# Patient Record
Sex: MALE | Race: WHITE | NOT HISPANIC OR LATINO | Employment: OTHER | URBAN - METROPOLITAN AREA
[De-identification: names, ages, dates, MRNs, and addresses within clinical notes are randomized per-mention and may not be internally consistent; named-entity substitution may affect disease eponyms.]

---

## 2017-08-23 ENCOUNTER — HOSPITAL ENCOUNTER (OUTPATIENT)
Dept: RADIOLOGY | Facility: HOSPITAL | Age: 32
Discharge: HOME | End: 2017-08-23
Attending: FAMILY MEDICINE
Payer: MEDICARE

## 2017-08-23 DIAGNOSIS — M25.562 LEFT KNEE PAIN, UNSPECIFIED CHRONICITY: ICD-10-CM

## 2017-08-23 DIAGNOSIS — R93.6 ABNORMAL X-RAY OF KNEE: ICD-10-CM

## 2017-08-23 PROCEDURE — 73700 CT LOWER EXTREMITY W/O DYE: CPT | Mod: TC,LT

## 2017-08-23 PROCEDURE — 73700 CT LOWER EXTREMITY W/O DYE: CPT | Mod: 26,LT,, | Performed by: RADIOLOGY

## 2017-11-06 ENCOUNTER — HOSPITAL ENCOUNTER (EMERGENCY)
Facility: HOSPITAL | Age: 32
Discharge: HOME | End: 2017-11-06
Attending: EMERGENCY MEDICINE
Payer: MEDICARE

## 2017-11-06 VITALS
DIASTOLIC BLOOD PRESSURE: 66 MMHG | RESPIRATION RATE: 16 BRPM | WEIGHT: 135 LBS | SYSTOLIC BLOOD PRESSURE: 114 MMHG | TEMPERATURE: 98 F | HEART RATE: 64 BPM | OXYGEN SATURATION: 99 % | BODY MASS INDEX: 22.49 KG/M2 | HEIGHT: 65 IN

## 2017-11-06 DIAGNOSIS — M25.511 RIGHT SHOULDER PAIN: ICD-10-CM

## 2017-11-06 DIAGNOSIS — R52 PAIN: ICD-10-CM

## 2017-11-06 PROCEDURE — 99283 EMERGENCY DEPT VISIT LOW MDM: CPT

## 2017-11-06 NOTE — DISCHARGE INSTRUCTIONS
Use the medications in your possession for pain.  Follow up with your primary care provider.  Return to the Emergency department for any worsening or failure to improve, otherwise follow up with your primary care provider.

## 2017-11-06 NOTE — ED TRIAGE NOTES
"Patient states that he has had difficulty moving his right shoulder for a month. Patient states he is not able to  a cup. No trauma or injury noted. Patient states "it just started hurting me." Patient states that it feels as if it is swollen.   "

## 2017-11-06 NOTE — ED PROVIDER NOTES
Encounter Date: 11/6/2017       History     Chief Complaint   Patient presents with    Shoulder Pain     Pt c/o right shoulder pain onset x 1 month, denies injury.      He complaint: Shoulder pain    History of present illness: Patient is a 32-year-old male who presents for one month of right sided shoulder pain with associated swelling near the chest region.  He reports the pain is constant he has not taken any of his medications for this he has access to Mobic, tramadol, Voltaren gel, Norco.  He reports that there is decreased strength in pain with movement.      The history is provided by the patient, the spouse and medical records. No  was used.     Review of patient's allergies indicates:  No Known Allergies  Past Medical History:   Diagnosis Date    Arthritis     in knee    Dysphagia, oropharyngeal 4/1/2016    Headache(784.0)     Irregular heart beat     Mass of larynx 10/2/2012    Neurofibromatosis syndrome     Neurofibromatosis, type 1 (von Recklinghausen's disease) birth    Neurofibromatosis, type 1 (von Recklinghausen's disease) 7/9/2012     Past Surgical History:   Procedure Laterality Date    ARM DEBRIDEMENT      NF - right    partial supraglottic laryngectomy  6/15/2012     Family History   Problem Relation Age of Onset    Cancer Father      neuro fibroma mitosis    Cancer Paternal Uncle      neuro fibroma mitosis    Cancer Cousin      neuro fibroma mitosis     Social History   Substance Use Topics    Smoking status: Former Smoker    Smokeless tobacco: Never Used    Alcohol use No     Review of Systems   Constitutional: Negative for appetite change, chills, diaphoresis, fatigue and fever.   HENT: Negative for congestion, ear discharge, ear pain, postnasal drip, rhinorrhea, sinus pressure, sneezing, sore throat and voice change.    Eyes: Negative for discharge, itching and visual disturbance.   Respiratory: Negative for cough, shortness of breath and wheezing.     Cardiovascular: Negative for chest pain, palpitations and leg swelling.   Gastrointestinal: Negative for abdominal pain, nausea and vomiting.   Endocrine: Negative for polydipsia, polyphagia and polyuria.   Genitourinary: Negative for difficulty urinating, discharge, dysuria, frequency, hematuria, penile pain, penile swelling and urgency.   Musculoskeletal: Positive for arthralgias and myalgias.   Skin: Negative for rash and wound.   Neurological: Negative for dizziness, seizures, syncope and weakness.   Hematological: Negative for adenopathy. Does not bruise/bleed easily.   Psychiatric/Behavioral: Negative for agitation and self-injury. The patient is not nervous/anxious.        Physical Exam     Initial Vitals [11/06/17 0333]   BP Pulse Resp Temp SpO2   113/70 76 16 97.8 °F (36.6 °C) 100 %      MAP       84.33         Physical Exam    Nursing note and vitals reviewed.  Constitutional: He appears well-developed and well-nourished. He is not diaphoretic. No distress.   HENT:   Head: Normocephalic and atraumatic.   Right Ear: External ear normal.   Left Ear: External ear normal.   Nose: Nose normal.   Eyes: Pupils are equal, round, and reactive to light. Right eye exhibits no discharge. Left eye exhibits no discharge. No scleral icterus.   Neck: Normal range of motion.   Pulmonary/Chest: No respiratory distress.   Abdominal: He exhibits no distension.   Musculoskeletal: Normal range of motion.        Right shoulder: He exhibits pain. He exhibits normal range of motion, no tenderness, no bony tenderness, no swelling, no effusion, no crepitus, no deformity, no laceration, no spasm, normal pulse and normal strength.   Neurological: He is alert and oriented to person, place, and time.   Skin: Skin is dry. Capillary refill takes less than 2 seconds.   Scattered growths consistent with diagnosis of neurofibromatosis         ED Course   Procedures  Labs Reviewed - No data to display                            ED Course as  of Nov 06 0603   Mon Nov 06, 2017   0500 BP: 113/70 [VC]   0500 Temp: 97.8 °F (36.6 °C) [VC]   0500 Pulse: 76 [VC]   0500 Resp: 16 [VC]   0500 VS normal.  Triage note reviewed.  Initial impression:  32 y.o. With 1 month of right shoulder pain and perception of swelling to the anterior chest.  Able to perform full passive ROM.      Plan:  CXR and Right shoulder xray, if neg will d/c to home with instructions to follow up with orthopedics    Plan discussed with DANTE Ramachandran MD who concurred. SpO2: 100 % [VC]   0501 Awaiting xray shoot.  [VC]   0524 Awaiting xray results.  [VC]      ED Course User Index  [VC] Sang Healy DNP     Clinical Impression:   Diagnoses of Right shoulder pain, Right shoulder pain, and Pain were pertinent to this visit.    Disposition:   Disposition: Discharged  Condition: Stable                        Sang Healy DNP  11/06/17 0605

## 2018-11-01 ENCOUNTER — OFFICE VISIT (OUTPATIENT)
Dept: OPTOMETRY | Facility: CLINIC | Age: 33
End: 2018-11-01
Payer: MEDICARE

## 2018-11-01 DIAGNOSIS — H52.7 REFRACTIVE ERROR: ICD-10-CM

## 2018-11-01 DIAGNOSIS — Q85.01 NEUROFIBROMATOSIS, TYPE 1 (VON RECKLINGHAUSEN'S DISEASE): Primary | ICD-10-CM

## 2018-11-01 PROCEDURE — 99212 OFFICE O/P EST SF 10 MIN: CPT | Mod: PBBFAC,HCWC,PO | Performed by: OPTOMETRIST

## 2018-11-01 PROCEDURE — 99999 PR PBB SHADOW E&M-EST. PATIENT-LVL II: CPT | Mod: PBBFAC,HCWC,, | Performed by: OPTOMETRIST

## 2018-11-01 PROCEDURE — 92004 COMPRE OPH EXAM NEW PT 1/>: CPT | Mod: HCWC,S$GLB,, | Performed by: OPTOMETRIST

## 2018-12-10 ENCOUNTER — HOSPITAL ENCOUNTER (EMERGENCY)
Facility: HOSPITAL | Age: 33
Discharge: HOME | End: 2018-12-10
Attending: EMERGENCY MEDICINE
Payer: MEDICARE

## 2018-12-10 VITALS
DIASTOLIC BLOOD PRESSURE: 80 MMHG | HEART RATE: 72 BPM | RESPIRATION RATE: 18 BRPM | TEMPERATURE: 98 F | HEIGHT: 64 IN | BODY MASS INDEX: 25.61 KG/M2 | WEIGHT: 150 LBS | OXYGEN SATURATION: 99 % | SYSTOLIC BLOOD PRESSURE: 132 MMHG

## 2018-12-10 DIAGNOSIS — M25.562 KNEE PAIN, LEFT: ICD-10-CM

## 2018-12-10 DIAGNOSIS — L73.9 ACUTE FOLLICULITIS: Primary | ICD-10-CM

## 2018-12-10 PROCEDURE — 10060 I&D ABSCESS SIMPLE/SINGLE: CPT

## 2018-12-10 PROCEDURE — 25000003 PHARM REV CODE 250: Performed by: EMERGENCY MEDICINE

## 2018-12-10 PROCEDURE — 99284 EMERGENCY DEPT VISIT MOD MDM: CPT | Mod: 25

## 2018-12-10 RX ORDER — BACITRACIN ZINC 500 UNIT/G
OINTMENT (GRAM) TOPICAL 2 TIMES DAILY
Qty: 30 G | Refills: 0 | Status: SHIPPED | OUTPATIENT
Start: 2018-12-10

## 2018-12-10 RX ORDER — LIDOCAINE HYDROCHLORIDE AND EPINEPHRINE 10; 10 MG/ML; UG/ML
1 INJECTION, SOLUTION INFILTRATION; PERINEURAL ONCE
Status: COMPLETED | OUTPATIENT
Start: 2018-12-10 | End: 2018-12-10

## 2018-12-10 RX ADMIN — LIDOCAINE HYDROCHLORIDE AND EPINEPHRINE 1 ML: 10; 10 INJECTION, SOLUTION INFILTRATION; PERINEURAL at 03:12

## 2018-12-10 RX ADMIN — BACITRACIN ZINC, NEOMYCIN SULFATE, AND POLYMYXIN B SULFATE 1 EACH: 400; 3.5; 5 OINTMENT TOPICAL at 04:12

## 2018-12-10 NOTE — ED TRIAGE NOTES
The pt states last night he can't bend his left leg. Was seen by his PCP, x-rays were taken, but he never received a call back with the results of the x-ray. States it hurt to walk on his left leg.    The pt noted an abscess on his right lower abdomen. Denies drainage and fever.  Reports a knot on the left side of his head.

## 2018-12-10 NOTE — ED PROVIDER NOTES
"Encounter Date: 12/10/2018    SCRIBE #1 NOTE: I, Luiz Dong, am scribing for, and in the presence of,  Terry Arias MD. I have scribed the following portions of the note - Other sections scribed: HPI, ROS, PE.       History     Chief Complaint   Patient presents with    Abscess     patietn reports "a knot" to lower right stomach and and "knot" to left pariental head, and left leg pain. Abscess noted to abdomen.    Leg Pain     CC: Abscess ; Leg Pain    HPI  The patient is a 32 y/o male with pmhx of arthritis and neurofibromatosis that presents for emergent evaluation of an abscess on the skin of his RLQ that he noticed x2-3 days ago. He notes that it is painful. Pt is also c/o L sided leg pain that has been intermittent over the last x2-3 months. Pt reports having an x-ray done at an urgent care and Dr. Abbasi seeing "something behind his knee". He notes a childhood injury to his L knee. Although he is not currently in pain, he states that it "hurts so bad sometimes that I cant walk". Pt is no longer being followed by dr. Abbasi, noting that he is waiting for a phone call from [facility] in order to establish another PCP. Pt denies fever, chills, NVD, HA, visual disturbance, SOB, chest pain, abdominal pain, weakness or numbness.       The history is provided by the patient. No  was used.     Review of patient's allergies indicates:  No Known Allergies  Past Medical History:   Diagnosis Date    Arthritis     in knee    Dysphagia, oropharyngeal 4/1/2016    Headache(784.0)     Irregular heart beat     Mass of larynx 10/2/2012    Neurofibromatosis syndrome     Neurofibromatosis, type 1 (von Recklinghausen's disease) birth    Neurofibromatosis, type 1 (von Recklinghausen's disease) 7/9/2012     Past Surgical History:   Procedure Laterality Date    ARM DEBRIDEMENT      NF - right    partial supraglottic laryngectomy  6/15/2012     Family History   Problem Relation Age of Onset    Cancer " Father         neuro fibroma mitosis    Cancer Paternal Uncle         neuro fibroma mitosis    Cancer Cousin         neuro fibroma mitosis     Social History     Tobacco Use    Smoking status: Former Smoker    Smokeless tobacco: Never Used   Substance Use Topics    Alcohol use: No    Drug use: No     Review of Systems   Constitutional: Negative for chills and fever.   HENT: Negative for congestion, ear pain, rhinorrhea and sore throat.    Eyes: Negative for pain and redness.   Respiratory: Negative for cough and shortness of breath.    Cardiovascular: Negative for chest pain.   Gastrointestinal: Negative for abdominal pain, diarrhea, nausea and vomiting.   Genitourinary: Negative for dysuria, flank pain, frequency, hematuria and urgency.   Musculoskeletal: Positive for arthralgias (L leg ). Negative for back pain and neck pain.   Skin: Negative for rash.        (+) Abscess   Neurological: Negative for weakness, light-headedness, numbness and headaches.   All other systems reviewed and are negative.      Physical Exam     Initial Vitals [12/10/18 1425]   BP Pulse Resp Temp SpO2   133/83 99 18 99.1 °F (37.3 °C) 97 %      MAP       --         Physical Exam    Nursing note and vitals reviewed.  Constitutional: He is not diaphoretic. No distress.   HENT:   Head: Normocephalic and atraumatic.   Mouth/Throat: Oropharynx is clear and moist.   Eyes: Conjunctivae and EOM are normal. Pupils are equal, round, and reactive to light. No scleral icterus.   Neck: Normal range of motion. Neck supple. No JVD present.   Cardiovascular: Normal rate, regular rhythm and intact distal pulses.   Pulmonary/Chest: Breath sounds normal. No stridor. No respiratory distress.   Abdominal: Soft. Bowel sounds are normal. He exhibits no distension. There is no tenderness.   Musculoskeletal: Normal range of motion. He exhibits no edema or tenderness.   Full and painless ROM of left lower extremity.    Neurological: He is alert and oriented to  person, place, and time. He has normal strength. No cranial nerve deficit.   Skin: Skin is warm and dry. No rash noted.   Pt has a sub-centimeter abscess to the skin on his RLQ.  No regional lymphadenopathy   Psychiatric: He has a normal mood and affect.         ED Course   I & D - Incision and Drainage  Date/Time: 12/10/2018 4:30 PM  Location procedure was performed: [facility] EMERGENCY DEPARTMENT  Performed by: Terry Arias MD  Authorized by: Terry Arias MD   Pre-operative diagnosis: abscess  Post-operative diagnosis: abscess  Consent Done: Yes  Consent: Verbal consent obtained.  Risks and benefits: risks, benefits and alternatives were discussed  Consent given by: patient  Patient identity confirmed: verbally with patient  Type: abscess  Body area: trunk  Location details: abdomen  Anesthesia: local infiltration    Anesthesia:  Local Anesthetic: lidocaine 1% with epinephrine  Anesthetic total: 1 mL  Patient sedated: no  Scalpel size: 11  Incision type: single straight  Complexity: simple  Drainage: pus  Drainage amount: scant  Wound treatment: incision and  drainage  Packing material: none  Complications: No  Estimated blood loss (mL): 0.5  Specimens: No  Implants: No  Patient tolerance: Patient tolerated the procedure well with no immediate complications        Labs Reviewed - No data to display       Imaging Results          X-Ray Knee 3 View Left (Final result)  Result time 12/10/18 16:29:52   Procedure changed from X-Ray Knee Complete 4 or More Views Left     Final result by Omid Chowdary MD (12/10/18 16:29:52)                 Impression:      No acute displaced fracture-dislocation identified.      Electronically signed by: Omid Chowdary MD  Date:    12/10/2018  Time:    16:29             Narrative:    EXAMINATION:  XR KNEE 3 VIEW LEFT    CLINICAL HISTORY:  pain; Pain in left knee    TECHNIQUE:  AP, lateral, and Merchant views of the left knee were performed.    COMPARISON:  None    FINDINGS:  Bones are  well mineralized. Overall alignment is within normal limits.  No displaced fracture, dislocation or destructive osseous process.  No large suprapatellar joint effusion.  Minimal spurring of the posterior patella.  No subcutaneous emphysema or radiodense retained foreign body.                                 Medical Decision Making:   History:   Old Medical Records: I decided to obtain old medical records.  Old Records Summarized: records from clinic visits.       <> Summary of Records: Last saw PMD in clinic 11/18  Differential Diagnosis:   Folliculitis  Abscess  Neurofibroma  Chronic knee pain  Arthritis  synovitis  Clinical Tests:   Radiological Study: Ordered and Reviewed  ED Management:  Patient is afebrile and in no acute distress at time history and physical.  He has a sub cm abscess to the abdominal wall in the right lower quadrant.  He has no abdominal tenderness. Small abscess likely a folliculitis versus furuncle.  Patient has full painless range of motion of his left knee.  There is no joint effusion, there is no erythema, there is no warmth to touch.  I have low clinical suspicion of septic arthritis.  X-rays negative for fracture dislocation but demonstrate some patellar spurring consistent with degenerative changes.  Patient is hemodynamically stable and fit for discharge to follow up with primary physician and orthopedic. Abscess I&D'd without event.  Patient counseled on supportive care, appropriate medication usage, concerning symptoms for which to return to ER and the importance of follow up. Understanding and agreement with treatment plan was expressed.   This chart was completed using dictation software, as a result there may be some typographical errors.             Scribe Attestation:   Scribe #1: I performed the above scribed service and the documentation accurately describes the services I performed. I attest to the accuracy of the note.    Attending Attestation:           Physician  Attestation for Scribe:  Physician Attestation Statement for Scribe #1: I, Terry Arias MD, reviewed documentation, as scribed by Luiz Dong in my presence, and it is both accurate and complete.                    Clinical Impression:   The primary encounter diagnosis was Acute folliculitis. A diagnosis of Knee pain, left was also pertinent to this visit.      Disposition:   Disposition: Discharged  Condition: Stable                        Terry Arias MD  12/10/18 9824

## 2018-12-10 NOTE — DISCHARGE INSTRUCTIONS
X-rays do not demonstrate any fracture or dislocation.  Your knee pain may be due to the early arthritis or to a ligament/tendon issue.  You should make an appointment to see an orthopedic doctor to monitor symptoms. The small abscess on your stomach is likely due to folliculitis.  Apply antibiotic ointment to the wound on your abdominal wall twice daily.  Use over-the-counter ibuprofen or Tylenol as needed for pain. It is very important to establish care with a primary care physician to monitor your neurofibromatosis and knee pain and refer you to the appropriate specialists for ongoing care.  Return to the emergency room for fever, numbness, weakness or any new, worsening or concerning symptoms

## 2019-06-13 NOTE — PROGRESS NOTES
Subjective:       Patient ID: Galindo Grant is a 33 y.o. male      Chief Complaint   Patient presents with    Concerns About Ocular Health     History of Present Illness  Mr. Galindo Grant for ocular health check.    Patient complains of ocular health he will like his retina to be checked.   Pt reports frequent blurry vision when focusing with glasses.    Would patient like a refraction today? yes    (-)drops  (-)flashes  (-)floaters  (-)diplopia    OCULAR HISTORY  Last Eye Exam: 2017  (-)eye surgery   (+)diagnosed or treated for any eye conditions or diseases     FAMILY HISTORY  (-)Glaucoma -      Assessment/Plan:     1. Neurofibromatosis, type 1 (von Recklinghausen's disease)  Pt states he was seen by a neuro-ophthalmologist in the past for NFB 1 and told he had tumors in the back of his left eye and that he would gradually lose his vision. Will have pt see Dr. aMsters to establish care.   - Ambulatory referral to Ophthalmology  - [facility] - OU - Extended - Both Eyes; Future    2. Refractive error  Educated patient on refractive error and discussed lens options. Dispensed updated spectacle Rx. Educated about adaptation period to new specs.    Eyeglass Final Rx     Eyeglass Final Rx       Sphere Cylinder Axis    Right -5.00 +0.75 005    Left -5.25 +0.50 075    Expiration Date:  11/2/2019                  Follow-up for Dr. Masters for neuro consult.        unable to determine due to altered mental status

## 2019-10-16 ENCOUNTER — HOSPITAL ENCOUNTER (EMERGENCY)
Facility: HOSPITAL | Age: 34
Discharge: HOME | End: 2019-10-16
Attending: EMERGENCY MEDICINE
Payer: MEDICARE

## 2019-10-16 VITALS
TEMPERATURE: 98 F | RESPIRATION RATE: 18 BRPM | WEIGHT: 150 LBS | HEIGHT: 64 IN | OXYGEN SATURATION: 97 % | DIASTOLIC BLOOD PRESSURE: 65 MMHG | BODY MASS INDEX: 25.61 KG/M2 | HEART RATE: 81 BPM | SYSTOLIC BLOOD PRESSURE: 134 MMHG

## 2019-10-16 DIAGNOSIS — H10.33 ACUTE CONJUNCTIVITIS OF BOTH EYES, UNSPECIFIED ACUTE CONJUNCTIVITIS TYPE: Primary | ICD-10-CM

## 2019-10-16 PROCEDURE — 25000003 PHARM REV CODE 250: Performed by: NURSE PRACTITIONER

## 2019-10-16 PROCEDURE — 99283 EMERGENCY DEPT VISIT LOW MDM: CPT

## 2019-10-16 RX ORDER — MOXIFLOXACIN 5 MG/ML
1 SOLUTION/ DROPS OPHTHALMIC 3 TIMES DAILY
Qty: 1.4 ML | Refills: 0 | Status: SHIPPED | OUTPATIENT
Start: 2019-10-16 | End: 2019-10-23

## 2019-10-16 RX ORDER — OFLOXACIN 3 MG/ML
2 SOLUTION/ DROPS OPHTHALMIC ONCE
Status: DISCONTINUED | OUTPATIENT
Start: 2019-10-16 | End: 2019-10-16

## 2019-10-16 RX ORDER — TETRACAINE HYDROCHLORIDE 5 MG/ML
2 SOLUTION OPHTHALMIC
Status: COMPLETED | OUTPATIENT
Start: 2019-10-16 | End: 2019-10-16

## 2019-10-16 RX ADMIN — TETRACAINE HYDROCHLORIDE 2 DROP: 5 SOLUTION OPHTHALMIC at 10:10

## 2019-10-16 RX ADMIN — FLUORESCEIN SODIUM 1 EACH: 1 STRIP OPHTHALMIC at 10:10

## 2019-10-16 NOTE — DISCHARGE INSTRUCTIONS
Use warm compresses over your eyes at least 4 times a day.    Use the ophthalmic drops every 8 hr for 7 days to cover for bacterial infection of the eye.  Your symptoms may also be due to a virus or cold.  They could also be a result of an allergy to something in the environment or if you had an irritant on your hands when rubbing the eyes.    Follow-up within optometrist or ophthalmologist in 2-3 days if symptoms have not improved.    Return to the emergency room for any new or worsening symptoms or concerns.

## 2019-10-16 NOTE — ED TRIAGE NOTES
"Pt cc Eye Problem Pt arrives to ER complaining of redness and drainage to both eyes, states "I woke up this morning and they were like this" reporting some blurred vision, burning and itching  "

## 2019-10-16 NOTE — ED PROVIDER NOTES
"Encounter Date: 10/16/2019    SCRIBE #1 NOTE: I, Virginia Hobson, am scribing for, and in the presence of,  Tatiana José NP. I have scribed the following portions of the note - Other sections scribed: HPI, ROS.       History     Chief Complaint   Patient presents with    Eye Problem     Arrives to ER complaining of redness and drainage to both eyes, states "I woke up this morning and they were like this" reporting some blurred vision, burning and itching.      CC: Eye Redness; Eye Discharge    HPI: This is a 34 y.o. male who presents to the ED complaining of bilateral eye redness and discharge that started this morning. The patient reports that last night he woke up around 0200 or 0300 and felt like something was in his eye. He notes that he started rubbing his eyes. He states that this morning his eyes were glued shut and there was a lot of crusting and discharge. He reports associated eye itching and blurry vision. He states that he wears eyeglasses daily. He denies eye pain, foreign body sensation, fever, and cold symptoms. No other associated symptoms. No alleviating factors.    The history is provided by the patient. No  was used.     Review of patient's allergies indicates:  No Known Allergies  Past Medical History:   Diagnosis Date    Arthritis     in knee    Dysphagia, oropharyngeal 4/1/2016    Headache(784.0)     Irregular heart beat     Mass of larynx 10/2/2012    Neurofibromatosis syndrome     Neurofibromatosis, type 1 (von Recklinghausen's disease) birth    Neurofibromatosis, type 1 (von Recklinghausen's disease) 7/9/2012     Past Surgical History:   Procedure Laterality Date    ARM DEBRIDEMENT      NF - right    partial supraglottic laryngectomy  6/15/2012     Family History   Problem Relation Age of Onset    Cancer Father         neuro fibroma mitosis    Cancer Paternal Uncle         neuro fibroma mitosis    Cancer Cousin         neuro fibroma mitosis     Social History "     Tobacco Use    Smoking status: Former Smoker    Smokeless tobacco: Never Used   Substance Use Topics    Alcohol use: No    Drug use: No     Review of Systems   Constitutional: Negative for fever.   HENT: Negative for congestion, rhinorrhea, sneezing and sore throat.    Eyes: Positive for discharge, redness, itching and visual disturbance (blurry vision). Negative for pain.   Respiratory: Negative for cough and shortness of breath.    Cardiovascular: Negative for chest pain.   Gastrointestinal: Negative for nausea.   Genitourinary: Negative for dysuria.   Musculoskeletal: Negative for back pain and myalgias.   Skin: Negative for rash.   Neurological: Negative for weakness and headaches.   Hematological: Does not bruise/bleed easily.       Physical Exam     Initial Vitals [10/16/19 1012]   BP Pulse Resp Temp SpO2   134/65 81 18 98.2 °F (36.8 °C) 97 %      MAP       --         Physical Exam    Nursing note and vitals reviewed.  Constitutional: Vital signs are normal. He appears well-developed and well-nourished. He is not diaphoretic. He is active and cooperative.  Non-toxic appearance. No distress.   Eyes: EOM are normal. Pupils are equal, round, and reactive to light. Right eye exhibits no chemosis and no hordeolum. No foreign body present in the right eye. Left eye exhibits no chemosis and no hordeolum. No foreign body present in the left eye. Right conjunctiva is injected. Right conjunctiva has no hemorrhage. Left conjunctiva is injected. Left conjunctiva has no hemorrhage. No scleral icterus.   Slit lamp exam:       The right eye shows no corneal abrasion and no fluorescein uptake.        The left eye shows no corneal abrasion and no fluorescein uptake.   Dried crusty drainage noted at lashline bilaterally   Pulmonary/Chest: No respiratory distress.   Neurological: He is alert and oriented to person, place, and time.   Skin: Skin is warm and dry. Capillary refill takes less than 2 seconds.   Psychiatric:  He has a normal mood and affect.         ED Course   Procedures  Labs Reviewed - No data to display       Imaging Results    None          Medical Decision Making:   Differential Diagnosis:   Allergic conjunctivitis, viral conjunctivitis, bacterial conjunctivitis, corneal abrasion, corneal ulcer, retained foreign body  ED Management:  This is an urgent evaluation of a 33 y/o male that presents to ED with eye redness and drainage today.  History and physical exam are suggestive of a conjunctivitis, though it etiology is uncertain.  He does have bilateral eye injection with crusting and drainage.  There are no signs of corneal ulcer, retained foreign body, rust ring, iritis, ruptured globe, or reported visual deficits.  Will treat with ophthalmic abx drops.  Advised to follow up with optho in 1-2 days for further evaluation of the injury.  Return precautions given.              Scribe Attestation:   Scribe #1: I performed the above scribed service and the documentation accurately describes the services I performed. I attest to the accuracy of the note.    Attending Attestation:           Physician Attestation for Scribe:  Physician Attestation Statement for Scribe #1: I, Tatiana José NP, reviewed documentation, as scribed by Virginia Hobson in my presence, and it is both accurate and complete.                    Clinical Impression:       ICD-10-CM ICD-9-CM   1. Acute conjunctivitis of both eyes, unspecified acute conjunctivitis type H10.33 372.00         Disposition:   Disposition: Discharged  Condition: Stable    Scribe attestation: ITatiana, personally performed the services described in this documentation. All medical record entries made by the scribe were at my direction and in my presence.  I have reviewed the chart and agree that the record reflects my personal performance and is accurate and complete.                      Tatiana José NP  10/16/19 3512

## 2019-12-11 ENCOUNTER — OFFICE VISIT (OUTPATIENT)
Dept: NEUROLOGY | Facility: CLINIC | Age: 34
End: 2019-12-11
Payer: MEDICARE

## 2019-12-11 ENCOUNTER — TELEPHONE (OUTPATIENT)
Dept: NEUROLOGY | Facility: CLINIC | Age: 34
End: 2019-12-11

## 2019-12-11 VITALS
SYSTOLIC BLOOD PRESSURE: 121 MMHG | DIASTOLIC BLOOD PRESSURE: 71 MMHG | HEIGHT: 64 IN | HEART RATE: 72 BPM | BODY MASS INDEX: 26.31 KG/M2 | WEIGHT: 154.13 LBS

## 2019-12-11 DIAGNOSIS — R25.1 EPISODE OF SHAKING: Primary | ICD-10-CM

## 2019-12-11 DIAGNOSIS — M54.50 CHRONIC MIDLINE LOW BACK PAIN WITHOUT SCIATICA: ICD-10-CM

## 2019-12-11 DIAGNOSIS — F41.9 ANXIETY: ICD-10-CM

## 2019-12-11 DIAGNOSIS — Q85.01 NEUROFIBROMATOSIS, PERIPHERAL, NF1: ICD-10-CM

## 2019-12-11 DIAGNOSIS — G89.29 CHRONIC MIDLINE LOW BACK PAIN WITHOUT SCIATICA: ICD-10-CM

## 2019-12-11 PROCEDURE — 99204 OFFICE O/P NEW MOD 45 MIN: CPT | Mod: S$GLB,,, | Performed by: PSYCHIATRY & NEUROLOGY

## 2019-12-11 PROCEDURE — 3008F PR BODY MASS INDEX (BMI) DOCUMENTED: ICD-10-PCS | Mod: CPTII,S$GLB,, | Performed by: PSYCHIATRY & NEUROLOGY

## 2019-12-11 PROCEDURE — 3008F BODY MASS INDEX DOCD: CPT | Mod: CPTII,S$GLB,, | Performed by: PSYCHIATRY & NEUROLOGY

## 2019-12-11 PROCEDURE — 99204 PR OFFICE/OUTPT VISIT, NEW, LEVL IV, 45-59 MIN: ICD-10-PCS | Mod: S$GLB,,, | Performed by: PSYCHIATRY & NEUROLOGY

## 2019-12-11 PROCEDURE — 99999 PR PBB SHADOW E&M-EST. PATIENT-LVL V: ICD-10-PCS | Mod: PBBFAC,,, | Performed by: PSYCHIATRY & NEUROLOGY

## 2019-12-11 PROCEDURE — 99999 PR PBB SHADOW E&M-EST. PATIENT-LVL V: CPT | Mod: PBBFAC,,, | Performed by: PSYCHIATRY & NEUROLOGY

## 2019-12-11 RX ORDER — CITALOPRAM 20 MG/1
20 TABLET, FILM COATED ORAL DAILY
Refills: 0 | COMMUNITY
Start: 2019-10-22 | End: 2021-03-22

## 2019-12-11 RX ORDER — SERTRALINE HYDROCHLORIDE 50 MG/1
TABLET, FILM COATED ORAL
COMMUNITY
Start: 2019-11-19 | End: 2021-03-22

## 2019-12-11 NOTE — LETTER
December 11, 2019      Liyah Spring DO  [address]  [facility] [address]           [facility]  [address]  [facility] [address]  Phone: [phone]  Fax: [phone]          Patient: Galindo Grant   MR Number: [id number]   YOB: 1985   Date of Visit: 12/11/2019       Dear Dr. Liyah Spring:    Thank you for referring Galindo Grant to me for evaluation. Attached you will find relevant portions of my assessment and plan of care.    If you have questions, please do not hesitate to call me. I look forward to following Galindo Grant along with you.    Sincerely,    Virginia Marshall,     Enclosure  CC:  No Recipients    If you would like to receive this communication electronically, please contact [email or web address] or [phone] to request more information on [email or web address] Link access.    For providers and/or their staff who would like to refer a patient to [facility], please contact us through our one-stop-shop provider referral line, [facility], at [phone].    If you feel you have received this communication in error or would no longer like to receive these types of communications, please e-mail [email or web address]

## 2019-12-11 NOTE — PROGRESS NOTES
Neurology Consult Note    Chief Complaint: episodes of shaking    HPI:   Galindo Grant is a 34 y.o. male with medical conditions as outlined below who presents for further evaluation of episodes of shaking. He states for the past 10 years, he has been having daily episodes of bilateral body shaking. He states his whole body will shake for 1-2 minutes and then will stop and he is immediately back to baseline. He denies LOC with these episodes and states he is aware it is happening. He denies tongue biting, warning sign or urinary incontinence. He states on rare occasions, he has staring episodes for a minute and then returns immediately back to baseline. He denies head trauma that has resulted in loss of consciousness, a family history of seizures or having childhood seizures. He states he had learning difficulties growing up, but graduated high school. He states he had to take special education classes. He has neurofibromatosis type 1. He states that years ago, he was told he had tumors in his eyes. He saw an optometrist last year who recommended a referral to Dr. Masters. He admits to a history of anxiety and depression, but denies suicidal or homicidal ideation. He states he was on celexa and lexapro in the past, but stopped these as he felt this was making him feel worse.     He admits to low back pain that can get up to a 10/10 on occasion. He denies radicular symptoms. 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 He has no further complaints.    Past Medical History:  Past Medical History:   Diagnosis Date    Arthritis     in knee    Dysphagia, oropharyngeal 4/1/2016    Headache(784.0)     Irregular heart beat     Mass of larynx 10/2/2012    Neurofibromatosis syndrome     Neurofibromatosis, type 1 (von Recklinghausen's disease) birth    Neurofibromatosis, type 1 (von Recklinghausen's disease) 7/9/2012       Past Surgical History:  Past Surgical History:   Procedure Laterality Date    ARM  DEBRIDEMENT      NF - right    partial supraglottic laryngectomy  6/15/2012       Social History:  Social History     Socioeconomic History    Marital status:      Spouse name: Not on file    Number of children: 1    Years of education: 12    Highest education level: Not on file   Occupational History    Occupation:      Employer: OTHER   Social Needs    Financial resource strain: Not on file    Food insecurity:     Worry: Not on file     Inability: Not on file    Transportation needs:     Medical: Not on file     Non-medical: Not on file   Tobacco Use    Smoking status: Former Smoker    Smokeless tobacco: Never Used   Substance and Sexual Activity    Alcohol use: No    Drug use: No    Sexual activity: Yes     Partners: Female   Lifestyle    Physical activity:     Days per week: Not on file     Minutes per session: Not on file    Stress: Not on file   Relationships    Social connections:     Talks on phone: Not on file     Gets together: Not on file     Attends Jainism service: Not on file     Active member of club or organization: Not on file     Attends meetings of clubs or organizations: Not on file     Relationship status: Not on file   Other Topics Concern    Not on file   Social History Narrative    Moved back to [facility] in 2015. Now .        Family History:  Family History   Problem Relation Age of Onset    Cancer Father         neuro fibroma mitosis    Cancer Paternal Uncle         neuro fibroma mitosis    Cancer Cousin         neuro fibroma mitosis       Medications:  Current Outpatient Medications   Medication Sig Dispense Refill    atorvastatin (LIPITOR) 10 MG tablet Take 1 tablet (10 mg total) by mouth once daily. 90 tablet 1    bacitracin 500 unit/gram Oint Apply topically 2 (two) times daily. 30 g 0    butalbital-acetaminophen-caffeine -40 mg (FIORICET, ESGIC) -40 mg per tablet Take 1 tablet by mouth every 12 (twelve) hours as needed. 30 tablet  1    calcium-vitamin D3 (CALCIUM 500 + D) 500 mg(1,250mg) -200 unit per tablet Take 1 tablet by mouth 2 (two) times daily with meals. 180 tablet 3    citalopram (CELEXA) 20 MG tablet Take 20 mg by mouth once daily.  0    cyclobenzaprine (FLEXERIL) 5 MG tablet Take 1 tablet (5 mg total) by mouth nightly as needed for Muscle spasms. 90 tablet 1    escitalopram oxalate (LEXAPRO) 10 MG tablet Take 1 tablet (10 mg total) by mouth once daily. 90 tablet 3    HYDROcodone-acetaminophen (NORCO) 7.5-325 mg per tablet Take 1 tablet by mouth daily as needed for Pain. 20 tablet 0    hydrOXYzine HCl (ATARAX) 25 MG tablet Take 1 tablet (25 mg total) by mouth 3 (three) times daily. DO NOT DRIVE AFTER TAKING MED 90 tablet 5    meloxicam (MOBIC) 15 MG tablet Take 1 tablet (15 mg total) by mouth once daily. TAKE WITH FOOD 90 tablet 1    metoprolol tartrate (LOPRESSOR) 25 MG tablet Take 0.5 tablets (12.5 mg total) by mouth 2 (two) times daily. 90 tablet 1    nitroGLYCERIN (NITROSTAT) 0.4 MG SL tablet Place 1 tablet (0.4 mg total) under the tongue every 5 (five) minutes as needed for Chest pain (Repeat in 5 min if CP persists. Go to ER if CP worsens). 30 tablet 2    promethazine (PHENERGAN) 12.5 MG Tab TAKE 1 TABLET EVERY 6 HOURS AS NEEDED 20 tablet 2    sertraline (ZOLOFT) 50 MG tablet       sumatriptan (IMITREX) 50 MG tablet 1 tab PO x1 prn. May take another tablet after 2 hours if the headache recurs. Maximum of 4 tablets a day. 12 tablet 1    tramadol (ULTRAM) 50 mg tablet Take 1 tablet (50 mg total) by mouth every 12 (twelve) hours as needed for Pain. 60 tablet 1    diclofenac sodium (VOLTAREN) 1 % Gel Apply 2 g topically 4 (four) times daily. 100 g 5    gabapentin (NEURONTIN) 300 MG capsule Take 1 capsule (300 mg total) by mouth 3 (three) times daily. 90 capsule 11    omeprazole (PRILOSEC) 40 MG capsule Take 1 capsule (40 mg total) by mouth once daily. 30 capsule 2     No current facility-administered medications  for this visit.        Allergies:  Review of patient's allergies indicates:  No Known Allergies    ROS:  A 12 point review of system was negative aside from pertinent positives and negatives as outlined above.    Physical Exam  Vitals:    12/11/19 0904   BP: 121/71   Pulse: 72       General: well nourished, well developed  Eyes: no scleral icterus   Nose: nasal turbinates intact  Neck: supple, ROM intact  Skin: no rash or ecchymosis  Joints: no swelling or erythema  Cardiac: regular rate and rhythm  Lungs: clear to auscultation bilaterally    Neuro:  Mental status: AAO x 3, no dysarthria, no aphasia, communicating appropriately  CN: PERRL, EOMI, VFF, V1-V3 sensation intact, no facial asymmetry, hearing grossly intact, tongue midline  Motor:   RUE 5/5  RLE 5/5  LUE 5/5  LLE 5/5    Reflexes: 2+ throughout, toes equivocal bilaterally  Sensory: intact to light touch throughout  Coordination: no dysmetria on FTN  Gait: steady    Prior Imaging/Labs:  Reviewed      Assessment and Plan:    34 y.o. male with NF1 and episodes of bilateral body shaking without altered awareness suspicious for nonepileptic events.     1. Episode of shaking  - EMU Monitoring for further characterization of events of bilateral body shaking and staring  He was advised to follow seizure restrictions until diagnosis clarified.  He was made aware that Seizure restrictions are but not limited to: no driving for six months after last episode of shaking, avoid swimming, high altitude activities, operating heavy machinery, bathing unattended, or engaging in activities in where an episode of staring or shaking will cause harm to self or others.      2. Neurofibromatosis, NF1  - Ambulatory Referral to Dr. Masters  - CT Lumbar Spine W Wo Contrast given low back pain, eval for neurofibroma    3. Anxiety  - Ambulatory Referral to Psychiatry    4. Chronic midline low back pain without sciatica  - CT Lumbar Spine W Wo Contrast; Future  - Creatinine, serum;  Future            Patient was advised to notify me for worsening symptoms. I will see patient back after EMU or sooner if necessary.     Thank you for this consultation.    Virginia Marshall DO  [facility]  [address] [facility] [address]  [place] [id number] [phone]

## 2019-12-19 ENCOUNTER — HOSPITAL ENCOUNTER (OUTPATIENT)
Dept: RADIOLOGY | Facility: HOSPITAL | Age: 34
Discharge: HOME | End: 2019-12-19
Attending: PSYCHIATRY & NEUROLOGY
Payer: MEDICARE

## 2019-12-19 DIAGNOSIS — Q85.01 NEUROFIBROMATOSIS, PERIPHERAL, NF1: ICD-10-CM

## 2019-12-19 DIAGNOSIS — M54.50 CHRONIC MIDLINE LOW BACK PAIN WITHOUT SCIATICA: ICD-10-CM

## 2019-12-19 DIAGNOSIS — G89.29 CHRONIC MIDLINE LOW BACK PAIN WITHOUT SCIATICA: ICD-10-CM

## 2019-12-19 PROCEDURE — 25500020 PHARM REV CODE 255: Performed by: PSYCHIATRY & NEUROLOGY

## 2019-12-19 PROCEDURE — 72133 CT LUMBAR SPINE W/O & W/DYE: CPT | Mod: 26,,, | Performed by: RADIOLOGY

## 2019-12-19 PROCEDURE — 72133 CT LUMBAR SPINE W/O & W/DYE: CPT | Mod: TC

## 2019-12-19 PROCEDURE — 72133 CT LUMBAR SPINE W WO CONTRAST: ICD-10-PCS | Mod: 26,,, | Performed by: RADIOLOGY

## 2019-12-19 RX ADMIN — IOHEXOL 75 ML: 350 INJECTION, SOLUTION INTRAVENOUS at 09:12

## 2020-01-21 ENCOUNTER — TELEPHONE (OUTPATIENT)
Dept: NEUROLOGY | Facility: CLINIC | Age: 35
End: 2020-01-21

## 2020-07-22 ENCOUNTER — HOSPITAL ENCOUNTER (EMERGENCY)
Facility: HOSPITAL | Age: 35
Discharge: HOME | End: 2020-07-22
Attending: EMERGENCY MEDICINE
Payer: COMMERCIAL

## 2020-07-22 VITALS
TEMPERATURE: 98 F | HEART RATE: 87 BPM | SYSTOLIC BLOOD PRESSURE: 128 MMHG | RESPIRATION RATE: 19 BRPM | OXYGEN SATURATION: 100 % | DIASTOLIC BLOOD PRESSURE: 76 MMHG

## 2020-07-22 DIAGNOSIS — R56.9 SEIZURE-LIKE ACTIVITY: Primary | ICD-10-CM

## 2020-07-22 LAB
ALBUMIN SERPL BCP-MCNC: 4.9 G/DL (ref 3.5–5.2)
ALP SERPL-CCNC: 51 U/L (ref 55–135)
ALT SERPL W/O P-5'-P-CCNC: 34 U/L (ref 10–44)
AMPHET+METHAMPHET UR QL: NEGATIVE
ANION GAP SERPL CALC-SCNC: 11 MMOL/L (ref 8–16)
APTT BLDCRRT: 26 SEC (ref 21–32)
APTT BLDCRRT: 26 SEC (ref 21–32)
AST SERPL-CCNC: 29 U/L (ref 10–40)
BARBITURATES UR QL SCN>200 NG/ML: NEGATIVE
BASOPHILS # BLD AUTO: 0.05 K/UL (ref 0–0.2)
BASOPHILS NFR BLD: 0.8 % (ref 0–1.9)
BENZODIAZ UR QL SCN>200 NG/ML: NEGATIVE
BILIRUB SERPL-MCNC: 0.7 MG/DL (ref 0.1–1)
BUN SERPL-MCNC: 29 MG/DL (ref 6–20)
BZE UR QL SCN: NEGATIVE
CALCIUM SERPL-MCNC: 9.8 MG/DL (ref 8.7–10.5)
CANNABINOIDS UR QL SCN: NEGATIVE
CHLORIDE SERPL-SCNC: 103 MMOL/L (ref 95–110)
CO2 SERPL-SCNC: 24 MMOL/L (ref 23–29)
CREAT SERPL-MCNC: 1.3 MG/DL (ref 0.5–1.4)
CREAT UR-MCNC: 87.8 MG/DL (ref 23–375)
DIFFERENTIAL METHOD: ABNORMAL
EOSINOPHIL # BLD AUTO: 0.2 K/UL (ref 0–0.5)
EOSINOPHIL NFR BLD: 3.3 % (ref 0–8)
ERYTHROCYTE [DISTWIDTH] IN BLOOD BY AUTOMATED COUNT: 12.5 % (ref 11.5–14.5)
EST. GFR  (AFRICAN AMERICAN): >60 ML/MIN/1.73 M^2
EST. GFR  (NON AFRICAN AMERICAN): >60 ML/MIN/1.73 M^2
ETHANOL SERPL-MCNC: <10 MG/DL
ETHANOL SERPL-MCNC: <10 MG/DL
GLUCOSE SERPL-MCNC: 95 MG/DL (ref 70–110)
HCT VFR BLD AUTO: 55.7 % (ref 40–54)
HGB BLD-MCNC: 18.7 G/DL (ref 14–18)
IMM GRANULOCYTES # BLD AUTO: 0.01 K/UL (ref 0–0.04)
IMM GRANULOCYTES NFR BLD AUTO: 0.2 % (ref 0–0.5)
INR PPP: 0.9 (ref 0.8–1.2)
LYMPHOCYTES # BLD AUTO: 2.5 K/UL (ref 1–4.8)
LYMPHOCYTES NFR BLD: 40.1 % (ref 18–48)
MCH RBC QN AUTO: 29.2 PG (ref 27–31)
MCHC RBC AUTO-ENTMCNC: 33.6 G/DL (ref 32–36)
MCV RBC AUTO: 87 FL (ref 82–98)
METHADONE UR QL SCN>300 NG/ML: NEGATIVE
MONOCYTES # BLD AUTO: 0.6 K/UL (ref 0.3–1)
MONOCYTES NFR BLD: 9.5 % (ref 4–15)
NEUTROPHILS # BLD AUTO: 2.9 K/UL (ref 1.8–7.7)
NEUTROPHILS NFR BLD: 46.1 % (ref 38–73)
NRBC BLD-RTO: 0 /100 WBC
OPIATES UR QL SCN: NEGATIVE
PCP UR QL SCN>25 NG/ML: NEGATIVE
PLATELET # BLD AUTO: 320 K/UL (ref 150–350)
PMV BLD AUTO: 10.2 FL (ref 9.2–12.9)
POTASSIUM SERPL-SCNC: 4.6 MMOL/L (ref 3.5–5.1)
PROT SERPL-MCNC: 8.3 G/DL (ref 6–8.4)
PROTHROMBIN TIME: 10.4 SEC (ref 9–12.5)
RBC # BLD AUTO: 6.41 M/UL (ref 4.6–6.2)
SODIUM SERPL-SCNC: 138 MMOL/L (ref 136–145)
TOXICOLOGY INFORMATION: NORMAL
TSH SERPL DL<=0.005 MIU/L-ACNC: 1.57 UIU/ML (ref 0.4–4)
WBC # BLD AUTO: 6.33 K/UL (ref 3.9–12.7)

## 2020-07-22 PROCEDURE — 25000003 PHARM REV CODE 250: Performed by: EMERGENCY MEDICINE

## 2020-07-22 PROCEDURE — 85610 PROTHROMBIN TIME: CPT

## 2020-07-22 PROCEDURE — 63600175 PHARM REV CODE 636 W HCPCS

## 2020-07-22 PROCEDURE — 96375 TX/PRO/DX INJ NEW DRUG ADDON: CPT

## 2020-07-22 PROCEDURE — 85730 THROMBOPLASTIN TIME PARTIAL: CPT

## 2020-07-22 PROCEDURE — 25500020 PHARM REV CODE 255: Performed by: EMERGENCY MEDICINE

## 2020-07-22 PROCEDURE — 84443 ASSAY THYROID STIM HORMONE: CPT

## 2020-07-22 PROCEDURE — 99285 EMERGENCY DEPT VISIT HI MDM: CPT | Mod: 25

## 2020-07-22 PROCEDURE — 96365 THER/PROPH/DIAG IV INF INIT: CPT | Mod: 59

## 2020-07-22 PROCEDURE — 85025 COMPLETE CBC W/AUTO DIFF WBC: CPT

## 2020-07-22 PROCEDURE — 80307 DRUG TEST PRSMV CHEM ANLYZR: CPT

## 2020-07-22 PROCEDURE — 63600175 PHARM REV CODE 636 W HCPCS: Performed by: EMERGENCY MEDICINE

## 2020-07-22 PROCEDURE — 80053 COMPREHEN METABOLIC PANEL: CPT

## 2020-07-22 PROCEDURE — 80320 DRUG SCREEN QUANTALCOHOLS: CPT

## 2020-07-22 RX ORDER — LORAZEPAM 2 MG/ML
INJECTION INTRAMUSCULAR
Status: COMPLETED
Start: 2020-07-22 | End: 2020-07-22

## 2020-07-22 RX ORDER — LEVETIRACETAM 10 MG/ML
1000 INJECTION INTRAVASCULAR ONCE
Status: COMPLETED | OUTPATIENT
Start: 2020-07-22 | End: 2020-07-22

## 2020-07-22 RX ORDER — LORAZEPAM 2 MG/ML
1 INJECTION INTRAMUSCULAR
Status: COMPLETED | OUTPATIENT
Start: 2020-07-22 | End: 2020-07-22

## 2020-07-22 RX ORDER — LEVETIRACETAM 500 MG/1
500 TABLET ORAL 2 TIMES DAILY
Qty: 60 TABLET | Refills: 2 | Status: SHIPPED | OUTPATIENT
Start: 2020-07-22 | End: 2021-03-22 | Stop reason: SDUPTHER

## 2020-07-22 RX ADMIN — IOHEXOL 75 ML: 350 INJECTION, SOLUTION INTRAVENOUS at 02:07

## 2020-07-22 RX ADMIN — LORAZEPAM 1 MG: 2 INJECTION INTRAMUSCULAR; INTRAVENOUS at 12:07

## 2020-07-22 RX ADMIN — SODIUM CHLORIDE 1000 ML: 0.9 INJECTION, SOLUTION INTRAVENOUS at 03:07

## 2020-07-22 RX ADMIN — LORAZEPAM 1 MG: 2 INJECTION INTRAMUSCULAR at 12:07

## 2020-07-22 RX ADMIN — LEVETIRACETAM INJECTION 1000 MG: 10 INJECTION INTRAVENOUS at 03:07

## 2020-07-22 NOTE — DISCHARGE INSTRUCTIONS
YOU MUST NOT DRIVE, SWIM, TAKE BATHS, CLIMB UP TO HEIGHT, OR ENGAGE IN ANY ACTIVITY WHICH COULD BE FATAL OR INJURE YOU OR THOSE AROUND YOU IF YOU WERE TO SEIZE WHILE PERFORMING THEM.    Thank you for coming to our Emergency Department today. It is important to remember that some problems are difficult to diagnose and may not be found during your first visit. Be sure to follow up with your primary care doctor and review any labs/imaging that was performed with them. If you do not have a primary care doctor, you may contact the one listed on your discharge paperwork or you may also call the [facility] Appointment Desk at [phone] to schedule an appointment with one.     All medications may potentially have side effects and it is impossible to predict which medications may give you side effects. If you feel that you are having a negative effect of any medication you should immediately stop taking them and seek medical attention.    Return to the ER with any questions/concerns, new/concerning symptoms, worsening or failure to improve. Do not drive or make any important decisions for 24 hours if you have received any pain medications, sedatives or mood altering drugs during your ER visit.

## 2020-07-22 NOTE — ED TRIAGE NOTES
Pt presents to ED with c/o seizures. Pt states having 10 seizure episodes (every 5 minutes per pt) PTA. Hx. Grand mal seizures. Pt states not being on any seizure medications. Pt also complains of nausea and SOB. Pt states the seizures will last a few minutes and then he's able to come out of it on his own.

## 2020-07-22 NOTE — ED PROVIDER NOTES
Encounter Date: 7/22/2020       History     Chief Complaint   Patient presents with    Seizures     Patient actively seizing upon arrival, immediately brought to room 5     35 y.o. male Past Medical History:  No date: Arthritis      Comment:  in knee  4/1/2016: Dysphagia, oropharyngeal  No date: Headache(784.0)  No date: Irregular heart beat  10/2/2012: Mass of larynx  No date: Neurofibromatosis syndrome  birth: Neurofibromatosis, type 1 (von Recklinghausen's disease)  7/9/2012: Neurofibromatosis, type 1 (von Recklinghausen's disease)     Notes that he has had seizures for several years but states he has never been on meds for it, prior neuro note mentions migraines for which he was on wellbutrin but do not mention seizures. Denies recent trauma/falls, notes that his seizures have become more frequent.         Review of patient's allergies indicates:  No Known Allergies  Past Medical History:   Diagnosis Date    Arthritis     in knee    Dysphagia, oropharyngeal 4/1/2016    Headache(784.0)     Irregular heart beat     Mass of larynx 10/2/2012    Neurofibromatosis syndrome     Neurofibromatosis, type 1 (von Recklinghausen's disease) birth    Neurofibromatosis, type 1 (von Recklinghausen's disease) 7/9/2012     Past Surgical History:   Procedure Laterality Date    ARM DEBRIDEMENT      NF - right    partial supraglottic laryngectomy  6/15/2012     Family History   Problem Relation Age of Onset    Cancer Father         neuro fibroma mitosis    Cancer Paternal Uncle         neuro fibroma mitosis    Cancer Cousin         neuro fibroma mitosis     Social History     Tobacco Use    Smoking status: Former Smoker    Smokeless tobacco: Never Used   Substance Use Topics    Alcohol use: No    Drug use: No     Review of Systems   Constitutional: Negative for fever.   HENT: Negative for sore throat.    Respiratory: Negative for shortness of breath.    Cardiovascular: Negative for chest pain.   Gastrointestinal:  "Negative for nausea.   Genitourinary: Negative for dysuria.   Musculoskeletal: Negative for back pain.   Skin: Negative for rash.   Neurological: Positive for seizures. Negative for weakness.   Hematological: Does not bruise/bleed easily.   All other systems reviewed and are negative.      Physical Exam     Initial Vitals [07/22/20 1225]   BP Pulse Resp Temp SpO2   123/76 93 (!) 36 -- --      MAP       --         Physical Exam    Nursing note and vitals reviewed.  Constitutional: He appears well-developed and well-nourished.   HENT:   Head: Normocephalic and atraumatic.   Eyes: EOM are normal. Pupils are equal, round, and reactive to light.   Cardiovascular: Normal rate and regular rhythm.   Pulmonary/Chest: Effort normal.   Abdominal: He exhibits no distension.   Musculoskeletal: No tenderness or edema.   Neurological: He is alert and oriented to person, place, and time. No cranial nerve deficit.   Skin: Skin is warm and dry.   Psychiatric: He has a normal mood and affect.     upon initial eval, pt rythmically jerking b/l UE, placed on bed and when attempting to remove a neck "gator" pt actually assists in removal and then goes back to jerking both arms, able to answer questions/provide history while he is "seizing"      Neurofibromatosis lesions noted  ED Course   Procedures  Labs Reviewed   CBC W/ AUTO DIFFERENTIAL   COMPREHENSIVE METABOLIC PANEL   PROTIME-INR   TSH   APTT   DRUG SCREEN PANEL, URINE EMERGENCY   ALCOHOL,MEDICAL (ETHANOL)     EKG Readings: (Independently Interpreted)   Hr 97, sinus, nl axis/intervals, no galo/twi, non acute, no stemi.       Imaging Results    None                             suspected pseudoseizure. Have ordered head ct with/without contrast and have d/w Dr. Lloyd. Will not place on anticonvulsants at this time.       Labs Reviewed   CBC W/ AUTO DIFFERENTIAL - Abnormal; Notable for the following components:       Result Value    RBC 6.41 (*)     Hemoglobin 18.7 (*)     Hematocrit " 55.7 (*)     All other components within normal limits   COMPREHENSIVE METABOLIC PANEL - Abnormal; Notable for the following components:    BUN, Bld 29 (*)     Alkaline Phosphatase 51 (*)     All other components within normal limits   PROTIME-INR   TSH   APTT   DRUG SCREEN PANEL, URINE EMERGENCY    Narrative:     Specimen Source->Urine   ALCOHOL,MEDICAL (ETHANOL)   ALCOHOL,MEDICAL (ETHANOL)   APTT       CT Head W Wo Contrast   Final Result      Unremarkable contrast enhanced CT scan of the brain.  MRI of the brain may be obtained for further evaluation.         Electronically signed by: Lee Barajas MD   Date:    07/22/2020   Time:    15:12          Seizure activity has resolved. will discharge on keppra 500mg bid to [facility] neuro      Clinical Impression:       ICD-10-CM ICD-9-CM   1. Seizure-like activity  R56.9 780.39                                Paola Hernandez MD  07/22/20 0033

## 2020-07-27 ENCOUNTER — TELEPHONE (OUTPATIENT)
Dept: NEUROLOGY | Facility: CLINIC | Age: 35
End: 2020-07-27

## 2020-07-27 NOTE — TELEPHONE ENCOUNTER
Returned call we do not take patients insurance.              ----- Message from Kia Helm sent at 7/27/2020  9:31 AM CDT -----  Regarding: Patient Call Back  Who called:ADILIA RECINOS [[id number]]    What is the request in detail: Patient is requesting a call back. He states he was advised by the Er to f/u with Dr. Otero regarding his MF. There was no availability. He would like to further discuss.   Please advise.    Can the clinic reply by [facility]? No    Best call back number: [phone]    Additional Information: N/A

## 2021-03-22 ENCOUNTER — OFFICE VISIT (OUTPATIENT)
Dept: NEUROLOGY | Facility: CLINIC | Age: 36
End: 2021-03-22
Payer: MEDICARE

## 2021-03-22 VITALS
WEIGHT: 154.31 LBS | BODY MASS INDEX: 27.34 KG/M2 | DIASTOLIC BLOOD PRESSURE: 82 MMHG | SYSTOLIC BLOOD PRESSURE: 127 MMHG | HEART RATE: 70 BPM | RESPIRATION RATE: 14 BRPM | HEIGHT: 63 IN

## 2021-03-22 DIAGNOSIS — Z13.828 SCOLIOSIS CONCERN: ICD-10-CM

## 2021-03-22 DIAGNOSIS — M54.12 CERVICAL RADICULOPATHY: ICD-10-CM

## 2021-03-22 DIAGNOSIS — M54.50 CHRONIC MIDLINE LOW BACK PAIN WITHOUT SCIATICA: ICD-10-CM

## 2021-03-22 DIAGNOSIS — R25.1 EPISODE OF SHAKING: ICD-10-CM

## 2021-03-22 DIAGNOSIS — G89.29 CHRONIC MIDLINE LOW BACK PAIN WITHOUT SCIATICA: ICD-10-CM

## 2021-03-22 DIAGNOSIS — F41.9 ANXIETY: ICD-10-CM

## 2021-03-22 DIAGNOSIS — Q85.01 NEUROFIBROMATOSIS, PERIPHERAL, NF1: Primary | ICD-10-CM

## 2021-03-22 PROCEDURE — 99215 PR OFFICE/OUTPT VISIT, EST, LEVL V, 40-54 MIN: ICD-10-PCS | Mod: S$GLB,,, | Performed by: NEUROLOGICAL SURGERY

## 2021-03-22 PROCEDURE — 3008F BODY MASS INDEX DOCD: CPT | Mod: CPTII,S$GLB,, | Performed by: NEUROLOGICAL SURGERY

## 2021-03-22 PROCEDURE — 1125F PR PAIN SEVERITY QUANTIFIED, PAIN PRESENT: ICD-10-PCS | Mod: S$GLB,,, | Performed by: NEUROLOGICAL SURGERY

## 2021-03-22 PROCEDURE — 99999 PR PBB SHADOW E&M-EST. PATIENT-LVL V: ICD-10-PCS | Mod: PBBFAC,,, | Performed by: NEUROLOGICAL SURGERY

## 2021-03-22 PROCEDURE — 99999 PR PBB SHADOW E&M-EST. PATIENT-LVL V: CPT | Mod: PBBFAC,,, | Performed by: NEUROLOGICAL SURGERY

## 2021-03-22 PROCEDURE — 1125F AMNT PAIN NOTED PAIN PRSNT: CPT | Mod: S$GLB,,, | Performed by: NEUROLOGICAL SURGERY

## 2021-03-22 PROCEDURE — 3008F PR BODY MASS INDEX (BMI) DOCUMENTED: ICD-10-PCS | Mod: CPTII,S$GLB,, | Performed by: NEUROLOGICAL SURGERY

## 2021-03-22 PROCEDURE — 99215 OFFICE O/P EST HI 40 MIN: CPT | Mod: S$GLB,,, | Performed by: NEUROLOGICAL SURGERY

## 2021-03-22 RX ORDER — FLUOXETINE HYDROCHLORIDE 20 MG/1
20 CAPSULE ORAL DAILY
Qty: 90 CAPSULE | Refills: 3 | Status: SHIPPED | OUTPATIENT
Start: 2021-03-22 | End: 2022-05-09 | Stop reason: SDUPTHER

## 2021-03-22 RX ORDER — LEVETIRACETAM 500 MG/1
1000 TABLET ORAL 2 TIMES DAILY
Qty: 360 TABLET | Refills: 3 | Status: SHIPPED | OUTPATIENT
Start: 2021-03-22 | End: 2022-03-23 | Stop reason: SDUPTHER

## 2021-03-31 ENCOUNTER — TELEPHONE (OUTPATIENT)
Dept: NEUROLOGY | Facility: CLINIC | Age: 36
End: 2021-03-31

## 2021-03-31 DIAGNOSIS — M54.50 BILATERAL LOW BACK PAIN WITHOUT SCIATICA, UNSPECIFIED CHRONICITY: Primary | ICD-10-CM

## 2021-03-31 DIAGNOSIS — R25.1 EPISODE OF SHAKING: Primary | ICD-10-CM

## 2021-04-05 ENCOUNTER — HOSPITAL ENCOUNTER (OUTPATIENT)
Dept: RADIOLOGY | Facility: HOSPITAL | Age: 36
Discharge: HOME | End: 2021-04-05
Attending: NEUROLOGICAL SURGERY
Payer: MEDICARE

## 2021-04-05 DIAGNOSIS — Z13.828 SCOLIOSIS CONCERN: ICD-10-CM

## 2021-04-05 DIAGNOSIS — Q85.01 NEUROFIBROMATOSIS, PERIPHERAL, NF1: ICD-10-CM

## 2021-04-05 DIAGNOSIS — M54.12 CERVICAL RADICULOPATHY: ICD-10-CM

## 2021-04-05 PROCEDURE — 25500020 PHARM REV CODE 255: Performed by: NEUROLOGICAL SURGERY

## 2021-04-05 PROCEDURE — 72125 CT NECK SPINE W/O DYE: CPT | Mod: 26,,, | Performed by: RADIOLOGY

## 2021-04-05 PROCEDURE — 72125 CT NECK SPINE W/O DYE: CPT | Mod: TC

## 2021-04-05 PROCEDURE — 70470 CT HEAD WITH AND WITHOUT: ICD-10-PCS | Mod: 26,,, | Performed by: RADIOLOGY

## 2021-04-05 PROCEDURE — 70470 CT HEAD/BRAIN W/O & W/DYE: CPT | Mod: TC

## 2021-04-05 PROCEDURE — 70470 CT HEAD/BRAIN W/O & W/DYE: CPT | Mod: 26,,, | Performed by: RADIOLOGY

## 2021-04-05 PROCEDURE — 72125 CT CERVICAL SPINE WITHOUT CONTRAST: ICD-10-PCS | Mod: 26,,, | Performed by: RADIOLOGY

## 2021-04-05 RX ADMIN — IOHEXOL 80 ML: 350 INJECTION, SOLUTION INTRAVENOUS at 10:04

## 2021-04-07 ENCOUNTER — TELEPHONE (OUTPATIENT)
Dept: NEUROLOGY | Facility: CLINIC | Age: 36
End: 2021-04-07

## 2021-04-12 ENCOUNTER — OFFICE VISIT (OUTPATIENT)
Dept: HEMATOLOGY/ONCOLOGY | Facility: CLINIC | Age: 36
End: 2021-04-12
Payer: MEDICARE

## 2021-04-12 ENCOUNTER — OFFICE VISIT (OUTPATIENT)
Dept: NEUROLOGY | Facility: CLINIC | Age: 36
End: 2021-04-12
Payer: MEDICARE

## 2021-04-12 ENCOUNTER — LAB VISIT (OUTPATIENT)
Dept: LAB | Facility: HOSPITAL | Age: 36
End: 2021-04-12
Attending: INTERNAL MEDICINE
Payer: MEDICARE

## 2021-04-12 VITALS
HEIGHT: 64 IN | TEMPERATURE: 98 F | SYSTOLIC BLOOD PRESSURE: 122 MMHG | BODY MASS INDEX: 27.13 KG/M2 | OXYGEN SATURATION: 95 % | WEIGHT: 158.94 LBS | DIASTOLIC BLOOD PRESSURE: 73 MMHG | HEART RATE: 89 BPM

## 2021-04-12 VITALS
WEIGHT: 154.31 LBS | BODY MASS INDEX: 27.34 KG/M2 | HEART RATE: 63 BPM | SYSTOLIC BLOOD PRESSURE: 126 MMHG | DIASTOLIC BLOOD PRESSURE: 78 MMHG | HEIGHT: 63 IN

## 2021-04-12 DIAGNOSIS — E83.10 DISORDER OF IRON METABOLISM, UNSPECIFIED: ICD-10-CM

## 2021-04-12 DIAGNOSIS — R71.8 ELEVATED HEMATOCRIT: Primary | ICD-10-CM

## 2021-04-12 DIAGNOSIS — Q85.01 NEUROFIBROMATOSIS, TYPE 1 (VON RECKLINGHAUSEN'S DISEASE): ICD-10-CM

## 2021-04-12 DIAGNOSIS — R56.9 SEIZURE: ICD-10-CM

## 2021-04-12 DIAGNOSIS — D58.2 ELEVATED HEMOGLOBIN: ICD-10-CM

## 2021-04-12 DIAGNOSIS — D58.2 ELEVATED HEMOGLOBIN: Primary | ICD-10-CM

## 2021-04-12 LAB
ALBUMIN SERPL BCP-MCNC: 4.2 G/DL (ref 3.5–5.2)
ALP SERPL-CCNC: 51 U/L (ref 55–135)
ALT SERPL W/O P-5'-P-CCNC: 26 U/L (ref 10–44)
ANION GAP SERPL CALC-SCNC: 9 MMOL/L (ref 8–16)
AST SERPL-CCNC: 18 U/L (ref 10–40)
BASOPHILS # BLD AUTO: 0.05 K/UL (ref 0–0.2)
BASOPHILS NFR BLD: 1.1 % (ref 0–1.9)
BILIRUB SERPL-MCNC: 0.8 MG/DL (ref 0.1–1)
BUN SERPL-MCNC: 10 MG/DL (ref 6–20)
CALCIUM SERPL-MCNC: 9.3 MG/DL (ref 8.7–10.5)
CHLORIDE SERPL-SCNC: 106 MMOL/L (ref 95–110)
CO2 SERPL-SCNC: 28 MMOL/L (ref 23–29)
CREAT SERPL-MCNC: 1.2 MG/DL (ref 0.5–1.4)
DIFFERENTIAL METHOD: NORMAL
EOSINOPHIL # BLD AUTO: 0.2 K/UL (ref 0–0.5)
EOSINOPHIL NFR BLD: 3.2 % (ref 0–8)
ERYTHROCYTE [DISTWIDTH] IN BLOOD BY AUTOMATED COUNT: 12 % (ref 11.5–14.5)
EST. GFR  (AFRICAN AMERICAN): >60 ML/MIN/1.73 M^2
EST. GFR  (NON AFRICAN AMERICAN): >60 ML/MIN/1.73 M^2
FERRITIN SERPL-MCNC: 77 NG/ML (ref 20–300)
GLUCOSE SERPL-MCNC: 61 MG/DL (ref 70–110)
HCT VFR BLD AUTO: 47.4 % (ref 40–54)
HGB BLD-MCNC: 16.4 G/DL (ref 14–18)
IMM GRANULOCYTES # BLD AUTO: 0.01 K/UL (ref 0–0.04)
IMM GRANULOCYTES NFR BLD AUTO: 0.2 % (ref 0–0.5)
IRON SERPL-MCNC: 120 UG/DL (ref 45–160)
LYMPHOCYTES # BLD AUTO: 1.4 K/UL (ref 1–4.8)
LYMPHOCYTES NFR BLD: 30.5 % (ref 18–48)
MCH RBC QN AUTO: 29.6 PG (ref 27–31)
MCHC RBC AUTO-ENTMCNC: 34.6 G/DL (ref 32–36)
MCV RBC AUTO: 86 FL (ref 82–98)
MONOCYTES # BLD AUTO: 0.6 K/UL (ref 0.3–1)
MONOCYTES NFR BLD: 12.2 % (ref 4–15)
NEUTROPHILS # BLD AUTO: 2.5 K/UL (ref 1.8–7.7)
NEUTROPHILS NFR BLD: 52.8 % (ref 38–73)
NRBC BLD-RTO: 0 /100 WBC
PATH REV BLD -IMP: NORMAL
PLATELET # BLD AUTO: 264 K/UL (ref 150–450)
PMV BLD AUTO: 10.1 FL (ref 9.2–12.9)
POTASSIUM SERPL-SCNC: 4.3 MMOL/L (ref 3.5–5.1)
PROT SERPL-MCNC: 7.1 G/DL (ref 6–8.4)
RBC # BLD AUTO: 5.54 M/UL (ref 4.6–6.2)
SATURATED IRON: 43 % (ref 20–50)
SODIUM SERPL-SCNC: 143 MMOL/L (ref 136–145)
TOTAL IRON BINDING CAPACITY: 280 UG/DL (ref 250–450)
TRANSFERRIN SERPL-MCNC: 189 MG/DL (ref 200–375)
WBC # BLD AUTO: 4.66 K/UL (ref 3.9–12.7)

## 2021-04-12 PROCEDURE — 81207 BCR/ABL1 GENE MINOR BP: CPT | Performed by: INTERNAL MEDICINE

## 2021-04-12 PROCEDURE — 85060 BLOOD SMEAR INTERPRETATION: CPT | Mod: ,,, | Performed by: PATHOLOGY

## 2021-04-12 PROCEDURE — 82728 ASSAY OF FERRITIN: CPT | Performed by: INTERNAL MEDICINE

## 2021-04-12 PROCEDURE — 99999 PR PBB SHADOW E&M-EST. PATIENT-LVL III: ICD-10-PCS | Mod: PBBFAC,,, | Performed by: NEUROLOGICAL SURGERY

## 2021-04-12 PROCEDURE — 80053 COMPREHEN METABOLIC PANEL: CPT | Performed by: INTERNAL MEDICINE

## 2021-04-12 PROCEDURE — 85060 PATHOLOGIST REVIEW: ICD-10-PCS | Mod: ,,, | Performed by: PATHOLOGY

## 2021-04-12 PROCEDURE — 82668 ASSAY OF ERYTHROPOIETIN: CPT | Performed by: INTERNAL MEDICINE

## 2021-04-12 PROCEDURE — 1126F PR PAIN SEVERITY QUANTIFIED, NO PAIN PRESENT: ICD-10-PCS | Mod: S$GLB,,, | Performed by: NEUROLOGICAL SURGERY

## 2021-04-12 PROCEDURE — 99214 OFFICE O/P EST MOD 30 MIN: CPT | Mod: S$GLB,,, | Performed by: NEUROLOGICAL SURGERY

## 2021-04-12 PROCEDURE — 3008F BODY MASS INDEX DOCD: CPT | Mod: CPTII,S$GLB,, | Performed by: NEUROLOGICAL SURGERY

## 2021-04-12 PROCEDURE — 99999 PR PBB SHADOW E&M-EST. PATIENT-LVL III: CPT | Mod: PBBFAC,,, | Performed by: NEUROLOGICAL SURGERY

## 2021-04-12 PROCEDURE — 99999 PR PBB SHADOW E&M-EST. PATIENT-LVL V: CPT | Mod: PBBFAC,,, | Performed by: INTERNAL MEDICINE

## 2021-04-12 PROCEDURE — 99999 PR PBB SHADOW E&M-EST. PATIENT-LVL V: ICD-10-PCS | Mod: PBBFAC,,, | Performed by: INTERNAL MEDICINE

## 2021-04-12 PROCEDURE — 99204 PR OFFICE/OUTPT VISIT, NEW, LEVL IV, 45-59 MIN: ICD-10-PCS | Mod: S$GLB,,, | Performed by: INTERNAL MEDICINE

## 2021-04-12 PROCEDURE — 3008F PR BODY MASS INDEX (BMI) DOCUMENTED: ICD-10-PCS | Mod: CPTII,S$GLB,, | Performed by: NEUROLOGICAL SURGERY

## 2021-04-12 PROCEDURE — 99214 PR OFFICE/OUTPT VISIT, EST, LEVL IV, 30-39 MIN: ICD-10-PCS | Mod: S$GLB,,, | Performed by: NEUROLOGICAL SURGERY

## 2021-04-12 PROCEDURE — 83540 ASSAY OF IRON: CPT | Performed by: INTERNAL MEDICINE

## 2021-04-12 PROCEDURE — 3008F PR BODY MASS INDEX (BMI) DOCUMENTED: ICD-10-PCS | Mod: CPTII,S$GLB,, | Performed by: INTERNAL MEDICINE

## 2021-04-12 PROCEDURE — 1126F PR PAIN SEVERITY QUANTIFIED, NO PAIN PRESENT: ICD-10-PCS | Mod: S$GLB,,, | Performed by: INTERNAL MEDICINE

## 2021-04-12 PROCEDURE — 99204 OFFICE O/P NEW MOD 45 MIN: CPT | Mod: S$GLB,,, | Performed by: INTERNAL MEDICINE

## 2021-04-12 PROCEDURE — 84403 ASSAY OF TOTAL TESTOSTERONE: CPT | Performed by: INTERNAL MEDICINE

## 2021-04-12 PROCEDURE — 85025 COMPLETE CBC W/AUTO DIFF WBC: CPT | Performed by: INTERNAL MEDICINE

## 2021-04-12 PROCEDURE — 1126F AMNT PAIN NOTED NONE PRSNT: CPT | Mod: S$GLB,,, | Performed by: INTERNAL MEDICINE

## 2021-04-12 PROCEDURE — 81270 JAK2 GENE: CPT | Performed by: INTERNAL MEDICINE

## 2021-04-12 PROCEDURE — 1126F AMNT PAIN NOTED NONE PRSNT: CPT | Mod: S$GLB,,, | Performed by: NEUROLOGICAL SURGERY

## 2021-04-12 PROCEDURE — 3008F BODY MASS INDEX DOCD: CPT | Mod: CPTII,S$GLB,, | Performed by: INTERNAL MEDICINE

## 2021-04-13 LAB
PATH REV BLD -IMP: NORMAL
TESTOST SERPL-MCNC: 822 NG/DL (ref 304–1227)

## 2021-04-15 LAB — EPO SERPL-ACNC: 11.8 MIU/ML (ref 2.6–18.5)

## 2021-04-16 LAB
DIAGNOSTIC BCR/ABL 1 RESULT: NORMAL
NARRATIVE DIAGNOSTIC REPORT-IMP: NORMAL
SPECIMEN TYPE, BCR/ABL: NORMAL

## 2021-04-17 ENCOUNTER — LAB VISIT (OUTPATIENT)
Dept: LAB | Facility: HOSPITAL | Age: 36
End: 2021-04-17
Attending: NEUROLOGICAL SURGERY
Payer: MEDICARE

## 2021-04-17 DIAGNOSIS — R71.8 ELEVATED HEMATOCRIT: ICD-10-CM

## 2021-04-17 LAB
BASOPHILS # BLD AUTO: 0.05 K/UL (ref 0–0.2)
BASOPHILS NFR BLD: 1 % (ref 0–1.9)
DIFFERENTIAL METHOD: NORMAL
EOSINOPHIL # BLD AUTO: 0.3 K/UL (ref 0–0.5)
EOSINOPHIL NFR BLD: 4.9 % (ref 0–8)
ERYTHROCYTE [DISTWIDTH] IN BLOOD BY AUTOMATED COUNT: 11.9 % (ref 11.5–14.5)
HCT VFR BLD AUTO: 49.4 % (ref 40–54)
HGB BLD-MCNC: 17 G/DL (ref 14–18)
IMM GRANULOCYTES # BLD AUTO: 0.01 K/UL (ref 0–0.04)
IMM GRANULOCYTES NFR BLD AUTO: 0.2 % (ref 0–0.5)
LYMPHOCYTES # BLD AUTO: 1.7 K/UL (ref 1–4.8)
LYMPHOCYTES NFR BLD: 33.3 % (ref 18–48)
MCH RBC QN AUTO: 29.7 PG (ref 27–31)
MCHC RBC AUTO-ENTMCNC: 34.4 G/DL (ref 32–36)
MCV RBC AUTO: 86 FL (ref 82–98)
MONOCYTES # BLD AUTO: 0.6 K/UL (ref 0.3–1)
MONOCYTES NFR BLD: 11.5 % (ref 4–15)
NEUTROPHILS # BLD AUTO: 2.5 K/UL (ref 1.8–7.7)
NEUTROPHILS NFR BLD: 49.1 % (ref 38–73)
NRBC BLD-RTO: 0 /100 WBC
PLATELET # BLD AUTO: 253 K/UL (ref 150–450)
PMV BLD AUTO: 9.9 FL (ref 9.2–12.9)
RBC # BLD AUTO: 5.72 M/UL (ref 4.6–6.2)
WBC # BLD AUTO: 5.13 K/UL (ref 3.9–12.7)

## 2021-04-17 PROCEDURE — 36415 COLL VENOUS BLD VENIPUNCTURE: CPT | Performed by: NEUROLOGICAL SURGERY

## 2021-04-17 PROCEDURE — 85025 COMPLETE CBC W/AUTO DIFF WBC: CPT | Performed by: NEUROLOGICAL SURGERY

## 2021-04-21 LAB
MPNR  FINAL DIAGNOSIS: NORMAL
MPNR  SPECIMEN TYPE: NORMAL
MPNR RESULT: NORMAL

## 2021-04-27 ENCOUNTER — LAB VISIT (OUTPATIENT)
Dept: LAB | Facility: HOSPITAL | Age: 36
End: 2021-04-27
Attending: INTERNAL MEDICINE
Payer: MEDICARE

## 2021-04-27 ENCOUNTER — OFFICE VISIT (OUTPATIENT)
Dept: HEMATOLOGY/ONCOLOGY | Facility: CLINIC | Age: 36
End: 2021-04-27
Payer: MEDICARE

## 2021-04-27 VITALS
WEIGHT: 158.5 LBS | DIASTOLIC BLOOD PRESSURE: 67 MMHG | SYSTOLIC BLOOD PRESSURE: 137 MMHG | HEART RATE: 98 BPM | BODY MASS INDEX: 27.06 KG/M2 | HEIGHT: 64 IN | OXYGEN SATURATION: 95 % | TEMPERATURE: 98 F

## 2021-04-27 DIAGNOSIS — Q85.01 NEUROFIBROMATOSIS, TYPE 1 (VON RECKLINGHAUSEN'S DISEASE): ICD-10-CM

## 2021-04-27 DIAGNOSIS — D58.2 ELEVATED HEMOGLOBIN: Primary | ICD-10-CM

## 2021-04-27 DIAGNOSIS — D58.2 ELEVATED HEMOGLOBIN: ICD-10-CM

## 2021-04-27 DIAGNOSIS — R56.9 SEIZURE: ICD-10-CM

## 2021-04-27 PROCEDURE — 99999 PR PBB SHADOW E&M-EST. PATIENT-LVL V: ICD-10-PCS | Mod: PBBFAC,,, | Performed by: INTERNAL MEDICINE

## 2021-04-27 PROCEDURE — 3008F PR BODY MASS INDEX (BMI) DOCUMENTED: ICD-10-PCS | Mod: CPTII,S$GLB,, | Performed by: INTERNAL MEDICINE

## 2021-04-27 PROCEDURE — 99213 OFFICE O/P EST LOW 20 MIN: CPT | Mod: S$GLB,,, | Performed by: INTERNAL MEDICINE

## 2021-04-27 PROCEDURE — 3008F BODY MASS INDEX DOCD: CPT | Mod: CPTII,S$GLB,, | Performed by: INTERNAL MEDICINE

## 2021-04-27 PROCEDURE — 1126F AMNT PAIN NOTED NONE PRSNT: CPT | Mod: S$GLB,,, | Performed by: INTERNAL MEDICINE

## 2021-04-27 PROCEDURE — 99213 PR OFFICE/OUTPT VISIT, EST, LEVL III, 20-29 MIN: ICD-10-PCS | Mod: S$GLB,,, | Performed by: INTERNAL MEDICINE

## 2021-04-27 PROCEDURE — 1126F PR PAIN SEVERITY QUANTIFIED, NO PAIN PRESENT: ICD-10-PCS | Mod: S$GLB,,, | Performed by: INTERNAL MEDICINE

## 2021-04-27 PROCEDURE — 36415 COLL VENOUS BLD VENIPUNCTURE: CPT | Performed by: INTERNAL MEDICINE

## 2021-04-27 PROCEDURE — 99999 PR PBB SHADOW E&M-EST. PATIENT-LVL V: CPT | Mod: PBBFAC,,, | Performed by: INTERNAL MEDICINE

## 2021-04-27 PROCEDURE — 81403 MOPATH PROCEDURE LEVEL 4: CPT | Performed by: INTERNAL MEDICINE

## 2021-04-27 RX ORDER — SIMVASTATIN 20 MG/1
20 TABLET, FILM COATED ORAL NIGHTLY
COMMUNITY

## 2021-04-27 RX ORDER — CHOLECALCIFEROL (VITAMIN D3) 25 MCG
1000 TABLET ORAL 2 TIMES DAILY
COMMUNITY

## 2021-04-27 RX ORDER — LANOLIN ALCOHOL/MO/W.PET/CERES
100 CREAM (GRAM) TOPICAL 2 TIMES DAILY
COMMUNITY

## 2021-05-03 LAB
JAK2 EXON 12 MUTATION DETECTION BLOOD: NORMAL
PATH REPORT.FINAL DX SPEC: NORMAL

## 2021-05-27 ENCOUNTER — TELEPHONE (OUTPATIENT)
Dept: NEUROLOGY | Facility: CLINIC | Age: 36
End: 2021-05-27

## 2021-05-31 ENCOUNTER — PATIENT MESSAGE (OUTPATIENT)
Dept: HEMATOLOGY/ONCOLOGY | Facility: CLINIC | Age: 36
End: 2021-05-31

## 2021-05-31 ENCOUNTER — LAB VISIT (OUTPATIENT)
Dept: LAB | Facility: HOSPITAL | Age: 36
End: 2021-05-31
Attending: INTERNAL MEDICINE
Payer: MEDICARE

## 2021-05-31 ENCOUNTER — OFFICE VISIT (OUTPATIENT)
Dept: HEMATOLOGY/ONCOLOGY | Facility: CLINIC | Age: 36
End: 2021-05-31
Payer: MEDICARE

## 2021-05-31 VITALS
DIASTOLIC BLOOD PRESSURE: 74 MMHG | WEIGHT: 156.06 LBS | SYSTOLIC BLOOD PRESSURE: 117 MMHG | HEART RATE: 80 BPM | OXYGEN SATURATION: 98 % | HEIGHT: 64 IN | BODY MASS INDEX: 26.64 KG/M2 | TEMPERATURE: 98 F

## 2021-05-31 DIAGNOSIS — R06.83 LOUD SNORING: ICD-10-CM

## 2021-05-31 DIAGNOSIS — D58.2 ELEVATED HEMOGLOBIN: ICD-10-CM

## 2021-05-31 DIAGNOSIS — Q85.01 NEUROFIBROMATOSIS, TYPE 1 (VON RECKLINGHAUSEN'S DISEASE): ICD-10-CM

## 2021-05-31 DIAGNOSIS — R56.9 SEIZURE: ICD-10-CM

## 2021-05-31 DIAGNOSIS — D58.2 ELEVATED HEMOGLOBIN: Primary | ICD-10-CM

## 2021-05-31 DIAGNOSIS — R31.9 HEMATURIA, UNSPECIFIED TYPE: ICD-10-CM

## 2021-05-31 DIAGNOSIS — R91.8 PULMONARY NODULES: ICD-10-CM

## 2021-05-31 LAB
ALBUMIN SERPL BCP-MCNC: 4 G/DL (ref 3.5–5.2)
ALP SERPL-CCNC: 46 U/L (ref 55–135)
ALT SERPL W/O P-5'-P-CCNC: 26 U/L (ref 10–44)
ANION GAP SERPL CALC-SCNC: 9 MMOL/L (ref 8–16)
AST SERPL-CCNC: 21 U/L (ref 10–40)
BASOPHILS # BLD AUTO: 0.05 K/UL (ref 0–0.2)
BASOPHILS NFR BLD: 1 % (ref 0–1.9)
BILIRUB SERPL-MCNC: 0.6 MG/DL (ref 0.1–1)
BUN SERPL-MCNC: 12 MG/DL (ref 6–20)
CALCIUM SERPL-MCNC: 9.2 MG/DL (ref 8.7–10.5)
CHLORIDE SERPL-SCNC: 106 MMOL/L (ref 95–110)
CO2 SERPL-SCNC: 25 MMOL/L (ref 23–29)
CREAT SERPL-MCNC: 1 MG/DL (ref 0.5–1.4)
DIFFERENTIAL METHOD: NORMAL
EOSINOPHIL # BLD AUTO: 0.3 K/UL (ref 0–0.5)
EOSINOPHIL NFR BLD: 6.5 % (ref 0–8)
ERYTHROCYTE [DISTWIDTH] IN BLOOD BY AUTOMATED COUNT: 11.9 % (ref 11.5–14.5)
EST. GFR  (AFRICAN AMERICAN): >60 ML/MIN/1.73 M^2
EST. GFR  (NON AFRICAN AMERICAN): >60 ML/MIN/1.73 M^2
GLUCOSE SERPL-MCNC: 85 MG/DL (ref 70–110)
HCT VFR BLD AUTO: 48.6 % (ref 40–54)
HGB BLD-MCNC: 16.7 G/DL (ref 14–18)
IMM GRANULOCYTES # BLD AUTO: 0.01 K/UL (ref 0–0.04)
IMM GRANULOCYTES NFR BLD AUTO: 0.2 % (ref 0–0.5)
LYMPHOCYTES # BLD AUTO: 1.6 K/UL (ref 1–4.8)
LYMPHOCYTES NFR BLD: 32.7 % (ref 18–48)
MCH RBC QN AUTO: 29.7 PG (ref 27–31)
MCHC RBC AUTO-ENTMCNC: 34.4 G/DL (ref 32–36)
MCV RBC AUTO: 87 FL (ref 82–98)
MONOCYTES # BLD AUTO: 0.5 K/UL (ref 0.3–1)
MONOCYTES NFR BLD: 10.4 % (ref 4–15)
NEUTROPHILS # BLD AUTO: 2.4 K/UL (ref 1.8–7.7)
NEUTROPHILS NFR BLD: 49.2 % (ref 38–73)
NRBC BLD-RTO: 0 /100 WBC
PLATELET # BLD AUTO: 245 K/UL (ref 150–450)
PMV BLD AUTO: 9.9 FL (ref 9.2–12.9)
POTASSIUM SERPL-SCNC: 4.3 MMOL/L (ref 3.5–5.1)
PROT SERPL-MCNC: 6.8 G/DL (ref 6–8.4)
RBC # BLD AUTO: 5.62 M/UL (ref 4.6–6.2)
SODIUM SERPL-SCNC: 140 MMOL/L (ref 136–145)
WBC # BLD AUTO: 4.8 K/UL (ref 3.9–12.7)

## 2021-05-31 PROCEDURE — 80053 COMPREHEN METABOLIC PANEL: CPT | Performed by: INTERNAL MEDICINE

## 2021-05-31 PROCEDURE — 3008F BODY MASS INDEX DOCD: CPT | Mod: CPTII,S$GLB,, | Performed by: INTERNAL MEDICINE

## 2021-05-31 PROCEDURE — 85025 COMPLETE CBC W/AUTO DIFF WBC: CPT | Performed by: INTERNAL MEDICINE

## 2021-05-31 PROCEDURE — 1126F PR PAIN SEVERITY QUANTIFIED, NO PAIN PRESENT: ICD-10-PCS | Mod: S$GLB,,, | Performed by: INTERNAL MEDICINE

## 2021-05-31 PROCEDURE — 1126F AMNT PAIN NOTED NONE PRSNT: CPT | Mod: S$GLB,,, | Performed by: INTERNAL MEDICINE

## 2021-05-31 PROCEDURE — 99215 OFFICE O/P EST HI 40 MIN: CPT | Mod: S$GLB,,, | Performed by: INTERNAL MEDICINE

## 2021-05-31 PROCEDURE — 99999 PR PBB SHADOW E&M-EST. PATIENT-LVL V: ICD-10-PCS | Mod: PBBFAC,,, | Performed by: INTERNAL MEDICINE

## 2021-05-31 PROCEDURE — 99215 PR OFFICE/OUTPT VISIT, EST, LEVL V, 40-54 MIN: ICD-10-PCS | Mod: S$GLB,,, | Performed by: INTERNAL MEDICINE

## 2021-05-31 PROCEDURE — 3008F PR BODY MASS INDEX (BMI) DOCUMENTED: ICD-10-PCS | Mod: CPTII,S$GLB,, | Performed by: INTERNAL MEDICINE

## 2021-05-31 PROCEDURE — 36415 COLL VENOUS BLD VENIPUNCTURE: CPT | Performed by: INTERNAL MEDICINE

## 2021-05-31 PROCEDURE — 99999 PR PBB SHADOW E&M-EST. PATIENT-LVL V: CPT | Mod: PBBFAC,,, | Performed by: INTERNAL MEDICINE

## 2021-06-07 ENCOUNTER — HOSPITAL ENCOUNTER (OUTPATIENT)
Dept: RADIOLOGY | Facility: HOSPITAL | Age: 36
Discharge: HOME | End: 2021-06-07
Attending: INTERNAL MEDICINE
Payer: MEDICARE

## 2021-06-07 DIAGNOSIS — R31.9 HEMATURIA, UNSPECIFIED TYPE: ICD-10-CM

## 2021-06-07 PROCEDURE — 76770 US EXAM ABDO BACK WALL COMP: CPT | Mod: TC

## 2021-06-07 PROCEDURE — 76770 US RETROPERITONEAL COMPLETE: ICD-10-PCS | Mod: 26,,, | Performed by: RADIOLOGY

## 2021-06-07 PROCEDURE — 76770 US EXAM ABDO BACK WALL COMP: CPT | Mod: 26,,, | Performed by: RADIOLOGY

## 2021-08-20 ENCOUNTER — OFFICE VISIT (OUTPATIENT)
Dept: UROLOGY | Facility: CLINIC | Age: 36
End: 2021-08-20
Payer: MEDICARE

## 2021-08-20 VITALS — BODY MASS INDEX: 27.35 KG/M2 | HEIGHT: 64 IN | WEIGHT: 160.19 LBS

## 2021-08-20 DIAGNOSIS — R31.9 HEMATURIA, UNSPECIFIED TYPE: Primary | ICD-10-CM

## 2021-08-20 DIAGNOSIS — R33.9 INCOMPLETE EMPTYING OF BLADDER: ICD-10-CM

## 2021-08-20 DIAGNOSIS — R31.9 HEMATURIA OF UNKNOWN CAUSE: ICD-10-CM

## 2021-08-20 DIAGNOSIS — N40.0 BPH WITHOUT OBSTRUCTION/LOWER URINARY TRACT SYMPTOMS: ICD-10-CM

## 2021-08-20 PROCEDURE — 3008F PR BODY MASS INDEX (BMI) DOCUMENTED: ICD-10-PCS | Mod: CPTII,S$GLB,, | Performed by: UROLOGY

## 2021-08-20 PROCEDURE — 1126F PR PAIN SEVERITY QUANTIFIED, NO PAIN PRESENT: ICD-10-PCS | Mod: CPTII,S$GLB,, | Performed by: UROLOGY

## 2021-08-20 PROCEDURE — 99204 OFFICE O/P NEW MOD 45 MIN: CPT | Mod: S$GLB,,, | Performed by: UROLOGY

## 2021-08-20 PROCEDURE — 1160F PR REVIEW ALL MEDS BY PRESCRIBER/CLIN PHARMACIST DOCUMENTED: ICD-10-PCS | Mod: CPTII,S$GLB,, | Performed by: UROLOGY

## 2021-08-20 PROCEDURE — 99204 PR OFFICE/OUTPT VISIT, NEW, LEVL IV, 45-59 MIN: ICD-10-PCS | Mod: S$GLB,,, | Performed by: UROLOGY

## 2021-08-20 PROCEDURE — 3008F BODY MASS INDEX DOCD: CPT | Mod: CPTII,S$GLB,, | Performed by: UROLOGY

## 2021-08-20 PROCEDURE — 99999 PR PBB SHADOW E&M-EST. PATIENT-LVL V: CPT | Mod: PBBFAC,,, | Performed by: UROLOGY

## 2021-08-20 PROCEDURE — 99999 PR PBB SHADOW E&M-EST. PATIENT-LVL V: ICD-10-PCS | Mod: PBBFAC,,, | Performed by: UROLOGY

## 2021-08-20 PROCEDURE — 1159F MED LIST DOCD IN RCRD: CPT | Mod: CPTII,S$GLB,, | Performed by: UROLOGY

## 2021-08-20 PROCEDURE — 1160F RVW MEDS BY RX/DR IN RCRD: CPT | Mod: CPTII,S$GLB,, | Performed by: UROLOGY

## 2021-08-20 PROCEDURE — 1126F AMNT PAIN NOTED NONE PRSNT: CPT | Mod: CPTII,S$GLB,, | Performed by: UROLOGY

## 2021-08-20 PROCEDURE — 1159F PR MEDICATION LIST DOCUMENTED IN MEDICAL RECORD: ICD-10-PCS | Mod: CPTII,S$GLB,, | Performed by: UROLOGY

## 2021-08-23 ENCOUNTER — LAB VISIT (OUTPATIENT)
Dept: LAB | Facility: HOSPITAL | Age: 36
End: 2021-08-23
Attending: INTERNAL MEDICINE
Payer: MEDICARE

## 2021-08-23 DIAGNOSIS — D58.2 ELEVATED HEMOGLOBIN: ICD-10-CM

## 2021-08-23 LAB
BASOPHILS # BLD AUTO: 0.06 K/UL (ref 0–0.2)
BASOPHILS NFR BLD: 0.9 % (ref 0–1.9)
DIFFERENTIAL METHOD: NORMAL
EOSINOPHIL # BLD AUTO: 0.2 K/UL (ref 0–0.5)
EOSINOPHIL NFR BLD: 3.4 % (ref 0–8)
ERYTHROCYTE [DISTWIDTH] IN BLOOD BY AUTOMATED COUNT: 12.2 % (ref 11.5–14.5)
HCT VFR BLD AUTO: 47.5 % (ref 40–54)
HGB BLD-MCNC: 16.4 G/DL (ref 14–18)
IMM GRANULOCYTES # BLD AUTO: 0.02 K/UL (ref 0–0.04)
IMM GRANULOCYTES NFR BLD AUTO: 0.3 % (ref 0–0.5)
LYMPHOCYTES # BLD AUTO: 2.6 K/UL (ref 1–4.8)
LYMPHOCYTES NFR BLD: 37.4 % (ref 18–48)
MCH RBC QN AUTO: 29.9 PG (ref 27–31)
MCHC RBC AUTO-ENTMCNC: 34.5 G/DL (ref 32–36)
MCV RBC AUTO: 87 FL (ref 82–98)
MONOCYTES # BLD AUTO: 0.8 K/UL (ref 0.3–1)
MONOCYTES NFR BLD: 11 % (ref 4–15)
NEUTROPHILS # BLD AUTO: 3.2 K/UL (ref 1.8–7.7)
NEUTROPHILS NFR BLD: 47 % (ref 38–73)
NRBC BLD-RTO: 0 /100 WBC
PLATELET # BLD AUTO: 280 K/UL (ref 150–450)
PMV BLD AUTO: 9.6 FL (ref 9.2–12.9)
RBC # BLD AUTO: 5.49 M/UL (ref 4.6–6.2)
WBC # BLD AUTO: 6.81 K/UL (ref 3.9–12.7)

## 2021-08-23 PROCEDURE — 85025 COMPLETE CBC W/AUTO DIFF WBC: CPT | Performed by: INTERNAL MEDICINE

## 2021-08-23 PROCEDURE — 82668 ASSAY OF ERYTHROPOIETIN: CPT | Performed by: INTERNAL MEDICINE

## 2021-08-23 PROCEDURE — 36415 COLL VENOUS BLD VENIPUNCTURE: CPT | Performed by: INTERNAL MEDICINE

## 2021-08-24 ENCOUNTER — TELEPHONE (OUTPATIENT)
Dept: UROLOGY | Facility: CLINIC | Age: 36
End: 2021-08-24

## 2021-08-26 LAB — EPO SERPL-ACNC: 9.6 MIU/ML (ref 2.6–18.5)

## 2021-09-20 ENCOUNTER — HOSPITAL ENCOUNTER (OUTPATIENT)
Dept: RADIOLOGY | Facility: HOSPITAL | Age: 36
Discharge: HOME | End: 2021-09-20
Attending: UROLOGY
Payer: MEDICARE

## 2021-09-20 DIAGNOSIS — R31.9 HEMATURIA OF UNKNOWN CAUSE: ICD-10-CM

## 2021-09-20 PROCEDURE — 74178 CT UROGRAM ABD PELVIS W WO: ICD-10-PCS | Mod: 26,,, | Performed by: INTERNAL MEDICINE

## 2021-09-20 PROCEDURE — 25500020 PHARM REV CODE 255: Performed by: UROLOGY

## 2021-09-20 PROCEDURE — 74178 CT ABD&PLV WO CNTR FLWD CNTR: CPT | Mod: 26,,, | Performed by: INTERNAL MEDICINE

## 2021-09-20 PROCEDURE — 74178 CT ABD&PLV WO CNTR FLWD CNTR: CPT | Mod: TC

## 2021-09-20 RX ADMIN — IOHEXOL 125 ML: 350 INJECTION, SOLUTION INTRAVENOUS at 02:09

## 2021-09-22 ENCOUNTER — OFFICE VISIT (OUTPATIENT)
Dept: HEMATOLOGY/ONCOLOGY | Facility: CLINIC | Age: 36
End: 2021-09-22
Payer: MEDICARE

## 2021-09-22 VITALS
TEMPERATURE: 98 F | BODY MASS INDEX: 25.58 KG/M2 | OXYGEN SATURATION: 97 % | WEIGHT: 159.19 LBS | HEART RATE: 77 BPM | HEIGHT: 66 IN | SYSTOLIC BLOOD PRESSURE: 120 MMHG | DIASTOLIC BLOOD PRESSURE: 74 MMHG

## 2021-09-22 DIAGNOSIS — R56.9 SEIZURE: ICD-10-CM

## 2021-09-22 DIAGNOSIS — Q85.01 NEUROFIBROMATOSIS, TYPE 1 (VON RECKLINGHAUSEN'S DISEASE): ICD-10-CM

## 2021-09-22 DIAGNOSIS — R91.8 PULMONARY NODULES: ICD-10-CM

## 2021-09-22 DIAGNOSIS — D58.2 ELEVATED HEMOGLOBIN: Primary | ICD-10-CM

## 2021-09-22 DIAGNOSIS — R31.9 HEMATURIA, UNSPECIFIED TYPE: ICD-10-CM

## 2021-09-22 PROCEDURE — 99999 PR PBB SHADOW E&M-EST. PATIENT-LVL IV: CPT | Mod: PBBFAC,,, | Performed by: INTERNAL MEDICINE

## 2021-09-22 PROCEDURE — 99999 PR PBB SHADOW E&M-EST. PATIENT-LVL IV: ICD-10-PCS | Mod: PBBFAC,,, | Performed by: INTERNAL MEDICINE

## 2021-09-22 PROCEDURE — 3074F PR MOST RECENT SYSTOLIC BLOOD PRESSURE < 130 MM HG: ICD-10-PCS | Mod: CPTII,S$GLB,, | Performed by: INTERNAL MEDICINE

## 2021-09-22 PROCEDURE — 3078F DIAST BP <80 MM HG: CPT | Mod: CPTII,S$GLB,, | Performed by: INTERNAL MEDICINE

## 2021-09-22 PROCEDURE — 3008F BODY MASS INDEX DOCD: CPT | Mod: CPTII,S$GLB,, | Performed by: INTERNAL MEDICINE

## 2021-09-22 PROCEDURE — 3074F SYST BP LT 130 MM HG: CPT | Mod: CPTII,S$GLB,, | Performed by: INTERNAL MEDICINE

## 2021-09-22 PROCEDURE — 3078F PR MOST RECENT DIASTOLIC BLOOD PRESSURE < 80 MM HG: ICD-10-PCS | Mod: CPTII,S$GLB,, | Performed by: INTERNAL MEDICINE

## 2021-09-22 PROCEDURE — 99214 OFFICE O/P EST MOD 30 MIN: CPT | Mod: S$GLB,,, | Performed by: INTERNAL MEDICINE

## 2021-09-22 PROCEDURE — 1159F MED LIST DOCD IN RCRD: CPT | Mod: CPTII,S$GLB,, | Performed by: INTERNAL MEDICINE

## 2021-09-22 PROCEDURE — 99214 PR OFFICE/OUTPT VISIT, EST, LEVL IV, 30-39 MIN: ICD-10-PCS | Mod: S$GLB,,, | Performed by: INTERNAL MEDICINE

## 2021-09-22 PROCEDURE — 1159F PR MEDICATION LIST DOCUMENTED IN MEDICAL RECORD: ICD-10-PCS | Mod: CPTII,S$GLB,, | Performed by: INTERNAL MEDICINE

## 2021-09-22 PROCEDURE — 3008F PR BODY MASS INDEX (BMI) DOCUMENTED: ICD-10-PCS | Mod: CPTII,S$GLB,, | Performed by: INTERNAL MEDICINE

## 2021-09-28 ENCOUNTER — PROCEDURE VISIT (OUTPATIENT)
Dept: UROLOGY | Facility: CLINIC | Age: 36
End: 2021-09-28
Payer: MEDICARE

## 2021-09-28 DIAGNOSIS — R31.9 HEMATURIA OF UNKNOWN CAUSE: ICD-10-CM

## 2021-09-28 PROCEDURE — 52000 CYSTOSCOPY: ICD-10-PCS | Mod: S$GLB,,, | Performed by: UROLOGY

## 2021-09-28 PROCEDURE — 81002 URINALYSIS NONAUTO W/O SCOPE: CPT | Mod: S$GLB,,, | Performed by: UROLOGY

## 2021-09-28 PROCEDURE — 52000 CYSTOURETHROSCOPY: CPT | Mod: S$GLB,,, | Performed by: UROLOGY

## 2021-09-28 PROCEDURE — 81002 POCT URINE DIPSTICK WITHOUT MICROSCOPE: ICD-10-PCS | Mod: S$GLB,,, | Performed by: UROLOGY

## 2021-09-29 ENCOUNTER — OFFICE VISIT (OUTPATIENT)
Dept: NEUROLOGY | Facility: CLINIC | Age: 36
End: 2021-09-29
Payer: MEDICARE

## 2021-09-29 VITALS
SYSTOLIC BLOOD PRESSURE: 109 MMHG | DIASTOLIC BLOOD PRESSURE: 62 MMHG | HEART RATE: 78 BPM | HEIGHT: 66 IN | WEIGHT: 161.63 LBS | BODY MASS INDEX: 25.97 KG/M2

## 2021-09-29 DIAGNOSIS — Q85.01 NEUROFIBROMATOSIS, TYPE 1 (VON RECKLINGHAUSEN'S DISEASE): Primary | ICD-10-CM

## 2021-09-29 DIAGNOSIS — M54.6 PAIN IN THORACIC SPINE: ICD-10-CM

## 2021-09-29 DIAGNOSIS — M54.9 DORSALGIA, UNSPECIFIED: ICD-10-CM

## 2021-09-29 LAB
BILIRUB SERPL-MCNC: NORMAL MG/DL
BLOOD URINE, POC: NORMAL
CLARITY, POC UA: CLEAR
COLOR, POC UA: YELLOW
GLUCOSE UR QL STRIP: NORMAL
KETONES UR QL STRIP: NORMAL
LEUKOCYTE ESTERASE URINE, POC: NORMAL
NITRITE, POC UA: NORMAL
PH, POC UA: 5
PROTEIN, POC: NORMAL
SPECIFIC GRAVITY, POC UA: 1010
UROBILINOGEN, POC UA: NORMAL

## 2021-09-29 PROCEDURE — 99999 PR PBB SHADOW E&M-EST. PATIENT-LVL IV: ICD-10-PCS | Mod: PBBFAC,,, | Performed by: NEUROLOGICAL SURGERY

## 2021-09-29 PROCEDURE — 3074F PR MOST RECENT SYSTOLIC BLOOD PRESSURE < 130 MM HG: ICD-10-PCS | Mod: CPTII,S$GLB,, | Performed by: NEUROLOGICAL SURGERY

## 2021-09-29 PROCEDURE — 99999 PR PBB SHADOW E&M-EST. PATIENT-LVL IV: CPT | Mod: PBBFAC,,, | Performed by: NEUROLOGICAL SURGERY

## 2021-09-29 PROCEDURE — 1159F MED LIST DOCD IN RCRD: CPT | Mod: CPTII,S$GLB,, | Performed by: NEUROLOGICAL SURGERY

## 2021-09-29 PROCEDURE — 99214 PR OFFICE/OUTPT VISIT, EST, LEVL IV, 30-39 MIN: ICD-10-PCS | Mod: S$GLB,,, | Performed by: NEUROLOGICAL SURGERY

## 2021-09-29 PROCEDURE — 3008F BODY MASS INDEX DOCD: CPT | Mod: CPTII,S$GLB,, | Performed by: NEUROLOGICAL SURGERY

## 2021-09-29 PROCEDURE — 99214 OFFICE O/P EST MOD 30 MIN: CPT | Mod: S$GLB,,, | Performed by: NEUROLOGICAL SURGERY

## 2021-09-29 PROCEDURE — 3078F DIAST BP <80 MM HG: CPT | Mod: CPTII,S$GLB,, | Performed by: NEUROLOGICAL SURGERY

## 2021-09-29 PROCEDURE — 3074F SYST BP LT 130 MM HG: CPT | Mod: CPTII,S$GLB,, | Performed by: NEUROLOGICAL SURGERY

## 2021-09-29 PROCEDURE — 1159F PR MEDICATION LIST DOCUMENTED IN MEDICAL RECORD: ICD-10-PCS | Mod: CPTII,S$GLB,, | Performed by: NEUROLOGICAL SURGERY

## 2021-09-29 PROCEDURE — 3078F PR MOST RECENT DIASTOLIC BLOOD PRESSURE < 80 MM HG: ICD-10-PCS | Mod: CPTII,S$GLB,, | Performed by: NEUROLOGICAL SURGERY

## 2021-09-29 PROCEDURE — 3008F PR BODY MASS INDEX (BMI) DOCUMENTED: ICD-10-PCS | Mod: CPTII,S$GLB,, | Performed by: NEUROLOGICAL SURGERY

## 2021-09-29 RX ORDER — NAPROXEN 500 MG/1
500 TABLET ORAL 2 TIMES DAILY
COMMUNITY
End: 2022-07-15

## 2021-09-29 RX ORDER — BACLOFEN 10 MG/1
10 TABLET ORAL 3 TIMES DAILY
Qty: 90 TABLET | Refills: 11 | Status: SHIPPED | OUTPATIENT
Start: 2021-09-29 | End: 2022-11-02 | Stop reason: SDUPTHER

## 2021-10-04 ENCOUNTER — HOSPITAL ENCOUNTER (OUTPATIENT)
Dept: RADIOLOGY | Facility: HOSPITAL | Age: 36
Discharge: HOME | End: 2021-10-04
Attending: NEUROLOGICAL SURGERY
Payer: MEDICARE

## 2021-10-04 DIAGNOSIS — Q85.01 NEUROFIBROMATOSIS, TYPE 1 (VON RECKLINGHAUSEN'S DISEASE): ICD-10-CM

## 2021-10-04 DIAGNOSIS — M54.6 PAIN IN THORACIC SPINE: ICD-10-CM

## 2021-10-04 DIAGNOSIS — M54.9 DORSALGIA, UNSPECIFIED: ICD-10-CM

## 2021-10-04 PROCEDURE — G1004 CT THORACIC SPINE W WO CONTRAST: ICD-10-PCS | Mod: ,,, | Performed by: RADIOLOGY

## 2021-10-04 PROCEDURE — 72130 CT THORACIC SPINE W WO CONTRAST: ICD-10-PCS | Mod: 26,MG,, | Performed by: RADIOLOGY

## 2021-10-04 PROCEDURE — G1004 CDSM NDSC: HCPCS

## 2021-10-04 PROCEDURE — 72133 CT LUMBAR SPINE W/O & W/DYE: CPT | Mod: TC,MF

## 2021-10-04 PROCEDURE — 72133 CT LUMBAR SPINE W WO CONTRAST: ICD-10-PCS | Mod: 26,,, | Performed by: RADIOLOGY

## 2021-10-04 PROCEDURE — 25500020 PHARM REV CODE 255: Performed by: NEUROLOGICAL SURGERY

## 2021-10-04 PROCEDURE — 72133 CT LUMBAR SPINE W/O & W/DYE: CPT | Mod: 26,,, | Performed by: RADIOLOGY

## 2021-10-04 PROCEDURE — 72130 CT CHEST SPINE W/O & W/DYE: CPT | Mod: 26,MG,, | Performed by: RADIOLOGY

## 2021-10-04 PROCEDURE — G1004 CDSM NDSC: HCPCS | Mod: ,,, | Performed by: RADIOLOGY

## 2021-10-04 RX ADMIN — IOHEXOL 100 ML: 350 INJECTION, SOLUTION INTRAVENOUS at 11:10

## 2021-10-26 ENCOUNTER — OFFICE VISIT (OUTPATIENT)
Dept: UROLOGY | Facility: CLINIC | Age: 36
End: 2021-10-26
Payer: MEDICARE

## 2021-10-26 VITALS — BODY MASS INDEX: 26.38 KG/M2 | HEIGHT: 66 IN | WEIGHT: 164.13 LBS

## 2021-10-26 DIAGNOSIS — N40.0 BPH WITHOUT OBSTRUCTION/LOWER URINARY TRACT SYMPTOMS: ICD-10-CM

## 2021-10-26 DIAGNOSIS — R31.9 HEMATURIA, UNSPECIFIED TYPE: ICD-10-CM

## 2021-10-26 DIAGNOSIS — R33.9 INCOMPLETE EMPTYING OF BLADDER: Primary | ICD-10-CM

## 2021-10-26 PROCEDURE — 87086 URINE CULTURE/COLONY COUNT: CPT | Performed by: UROLOGY

## 2021-10-26 PROCEDURE — 99999 PR PBB SHADOW E&M-EST. PATIENT-LVL V: CPT | Mod: PBBFAC,,, | Performed by: UROLOGY

## 2021-10-26 PROCEDURE — 99999 PR PBB SHADOW E&M-EST. PATIENT-LVL V: ICD-10-PCS | Mod: PBBFAC,,, | Performed by: UROLOGY

## 2021-10-26 PROCEDURE — 99214 OFFICE O/P EST MOD 30 MIN: CPT | Mod: S$GLB,,, | Performed by: UROLOGY

## 2021-10-26 PROCEDURE — 99214 PR OFFICE/OUTPT VISIT, EST, LEVL IV, 30-39 MIN: ICD-10-PCS | Mod: S$GLB,,, | Performed by: UROLOGY

## 2021-10-26 PROCEDURE — 99215 OFFICE O/P EST HI 40 MIN: CPT | Mod: PBBFAC | Performed by: UROLOGY

## 2021-10-28 LAB — BACTERIA UR CULT: NORMAL

## 2021-11-23 ENCOUNTER — HOSPITAL ENCOUNTER (OUTPATIENT)
Dept: PREADMISSION TESTING | Facility: HOSPITAL | Age: 36
Discharge: HOME | End: 2021-11-23
Attending: UROLOGY
Payer: MEDICARE

## 2021-11-23 ENCOUNTER — TELEPHONE (OUTPATIENT)
Dept: UROLOGY | Facility: CLINIC | Age: 36
End: 2021-11-23
Payer: MEDICARE

## 2021-11-23 ENCOUNTER — ANESTHESIA EVENT (OUTPATIENT)
Dept: SURGERY | Facility: HOSPITAL | Age: 36
End: 2021-11-23
Payer: MEDICARE

## 2021-11-23 VITALS
WEIGHT: 167.56 LBS | HEIGHT: 64 IN | OXYGEN SATURATION: 100 % | BODY MASS INDEX: 28.6 KG/M2 | HEART RATE: 63 BPM | DIASTOLIC BLOOD PRESSURE: 88 MMHG | SYSTOLIC BLOOD PRESSURE: 130 MMHG | RESPIRATION RATE: 17 BRPM | TEMPERATURE: 97 F

## 2021-11-23 DIAGNOSIS — Z01.818 PREOPERATIVE TESTING: ICD-10-CM

## 2021-11-23 DIAGNOSIS — R33.9 INCOMPLETE EMPTYING OF BLADDER: ICD-10-CM

## 2021-11-23 DIAGNOSIS — Z01.818 PREOPERATIVE TESTING: Primary | ICD-10-CM

## 2021-11-23 LAB
ANION GAP SERPL CALC-SCNC: 5 MMOL/L (ref 8–16)
BASOPHILS # BLD AUTO: 0.04 K/UL (ref 0–0.2)
BASOPHILS NFR BLD: 0.8 % (ref 0–1.9)
BUN SERPL-MCNC: 11 MG/DL (ref 6–20)
CALCIUM SERPL-MCNC: 9.2 MG/DL (ref 8.7–10.5)
CHLORIDE SERPL-SCNC: 107 MMOL/L (ref 95–110)
CO2 SERPL-SCNC: 29 MMOL/L (ref 23–29)
CREAT SERPL-MCNC: 1 MG/DL (ref 0.5–1.4)
DIFFERENTIAL METHOD: NORMAL
EOSINOPHIL # BLD AUTO: 0.3 K/UL (ref 0–0.5)
EOSINOPHIL NFR BLD: 6.5 % (ref 0–8)
ERYTHROCYTE [DISTWIDTH] IN BLOOD BY AUTOMATED COUNT: 11.9 % (ref 11.5–14.5)
EST. GFR  (AFRICAN AMERICAN): >60 ML/MIN/1.73 M^2
EST. GFR  (NON AFRICAN AMERICAN): >60 ML/MIN/1.73 M^2
GLUCOSE SERPL-MCNC: 90 MG/DL (ref 70–110)
HCT VFR BLD AUTO: 49.5 % (ref 40–54)
HGB BLD-MCNC: 16.7 G/DL (ref 14–18)
IMM GRANULOCYTES # BLD AUTO: 0.02 K/UL (ref 0–0.04)
IMM GRANULOCYTES NFR BLD AUTO: 0.4 % (ref 0–0.5)
LYMPHOCYTES # BLD AUTO: 1.8 K/UL (ref 1–4.8)
LYMPHOCYTES NFR BLD: 37.6 % (ref 18–48)
MCH RBC QN AUTO: 30.3 PG (ref 27–31)
MCHC RBC AUTO-ENTMCNC: 33.7 G/DL (ref 32–36)
MCV RBC AUTO: 90 FL (ref 82–98)
MONOCYTES # BLD AUTO: 0.6 K/UL (ref 0.3–1)
MONOCYTES NFR BLD: 12 % (ref 4–15)
NEUTROPHILS # BLD AUTO: 2.1 K/UL (ref 1.8–7.7)
NEUTROPHILS NFR BLD: 42.7 % (ref 38–73)
NRBC BLD-RTO: 0 /100 WBC
PLATELET # BLD AUTO: 227 K/UL (ref 150–450)
PMV BLD AUTO: 9.5 FL (ref 9.2–12.9)
POTASSIUM SERPL-SCNC: 4.3 MMOL/L (ref 3.5–5.1)
RBC # BLD AUTO: 5.51 M/UL (ref 4.6–6.2)
SODIUM SERPL-SCNC: 141 MMOL/L (ref 136–145)
WBC # BLD AUTO: 4.9 K/UL (ref 3.9–12.7)

## 2021-11-23 PROCEDURE — 93010 ELECTROCARDIOGRAM REPORT: CPT | Mod: ,,, | Performed by: INTERNAL MEDICINE

## 2021-11-23 PROCEDURE — 93005 ELECTROCARDIOGRAM TRACING: CPT

## 2021-11-23 PROCEDURE — 85025 COMPLETE CBC W/AUTO DIFF WBC: CPT | Performed by: UROLOGY

## 2021-11-23 PROCEDURE — 36415 COLL VENOUS BLD VENIPUNCTURE: CPT | Performed by: UROLOGY

## 2021-11-23 PROCEDURE — 93010 EKG 12-LEAD: ICD-10-PCS | Mod: ,,, | Performed by: INTERNAL MEDICINE

## 2021-11-23 PROCEDURE — 80048 BASIC METABOLIC PNL TOTAL CA: CPT | Performed by: UROLOGY

## 2021-11-30 ENCOUNTER — HOSPITAL ENCOUNTER (OUTPATIENT)
Dept: PREADMISSION TESTING | Facility: HOSPITAL | Age: 36
Discharge: HOME | End: 2021-11-30
Attending: UROLOGY
Payer: MEDICARE

## 2021-11-30 DIAGNOSIS — Z01.818 PREOP TESTING: ICD-10-CM

## 2021-11-30 LAB — SARS-COV-2 RDRP RESP QL NAA+PROBE: NEGATIVE

## 2021-11-30 PROCEDURE — U0002 COVID-19 LAB TEST NON-CDC: HCPCS | Performed by: UROLOGY

## 2021-12-02 ENCOUNTER — OFFICE VISIT (OUTPATIENT)
Dept: CARDIOLOGY | Facility: CLINIC | Age: 36
End: 2021-12-02
Payer: MEDICARE

## 2021-12-02 VITALS
BODY MASS INDEX: 28.51 KG/M2 | DIASTOLIC BLOOD PRESSURE: 62 MMHG | HEART RATE: 84 BPM | WEIGHT: 167 LBS | SYSTOLIC BLOOD PRESSURE: 134 MMHG | HEIGHT: 64 IN | OXYGEN SATURATION: 96 %

## 2021-12-02 DIAGNOSIS — Q85.01 NEUROFIBROMATOSIS, TYPE 1 (VON RECKLINGHAUSEN'S DISEASE): Primary | ICD-10-CM

## 2021-12-02 DIAGNOSIS — R07.89 CHEST PAIN, ATYPICAL: ICD-10-CM

## 2021-12-02 DIAGNOSIS — R06.09 DOE (DYSPNEA ON EXERTION): ICD-10-CM

## 2021-12-02 PROCEDURE — 99204 OFFICE O/P NEW MOD 45 MIN: CPT | Mod: S$GLB,,, | Performed by: INTERNAL MEDICINE

## 2021-12-02 PROCEDURE — 99204 PR OFFICE/OUTPT VISIT, NEW, LEVL IV, 45-59 MIN: ICD-10-PCS | Mod: S$GLB,,, | Performed by: INTERNAL MEDICINE

## 2021-12-02 PROCEDURE — 99999 PR PBB SHADOW E&M-EST. PATIENT-LVL IV: CPT | Mod: PBBFAC,,, | Performed by: INTERNAL MEDICINE

## 2021-12-02 PROCEDURE — 99999 PR PBB SHADOW E&M-EST. PATIENT-LVL IV: ICD-10-PCS | Mod: PBBFAC,,, | Performed by: INTERNAL MEDICINE

## 2021-12-07 ENCOUNTER — HOSPITAL ENCOUNTER (OUTPATIENT)
Dept: RADIOLOGY | Facility: HOSPITAL | Age: 36
Discharge: HOME | End: 2021-12-07
Attending: INTERNAL MEDICINE
Payer: MEDICARE

## 2021-12-07 ENCOUNTER — HOSPITAL ENCOUNTER (OUTPATIENT)
Dept: CARDIOLOGY | Facility: HOSPITAL | Age: 36
Discharge: HOME | End: 2021-12-07
Attending: INTERNAL MEDICINE
Payer: MEDICARE

## 2021-12-07 VITALS
HEIGHT: 64 IN | BODY MASS INDEX: 28.51 KG/M2 | SYSTOLIC BLOOD PRESSURE: 134 MMHG | DIASTOLIC BLOOD PRESSURE: 62 MMHG | WEIGHT: 167 LBS

## 2021-12-07 DIAGNOSIS — Q85.01 NEUROFIBROMATOSIS, TYPE 1 (VON RECKLINGHAUSEN'S DISEASE): ICD-10-CM

## 2021-12-07 DIAGNOSIS — R07.89 CHEST PAIN, ATYPICAL: ICD-10-CM

## 2021-12-07 DIAGNOSIS — R06.09 DOE (DYSPNEA ON EXERTION): ICD-10-CM

## 2021-12-07 LAB
AORTIC ROOT ANNULUS: 2.9 CM
AORTIC VALVE CUSP SEPERATION: 2.11 CM
ASCENDING AORTA: 2.76 CM
AV INDEX (PROSTH): 0.64
AV MEAN GRADIENT: 3 MMHG
AV PEAK GRADIENT: 5 MMHG
AV VALVE AREA: 1.92 CM2
AV VELOCITY RATIO: 0.81
BSA FOR ECHO PROCEDURE: 1.85 M2
CV ECHO LV RWT: 0.35 CM
CV STRESS BASE HR: 65 BPM
DIASTOLIC BLOOD PRESSURE: 62 MMHG
DOP CALC AO PEAK VEL: 1.13 M/S
DOP CALC AO VTI: 25.22 CM
DOP CALC LVOT AREA: 3 CM2
DOP CALC LVOT DIAMETER: 1.96 CM
DOP CALC LVOT PEAK VEL: 0.91 M/S
DOP CALC LVOT STROKE VOLUME: 48.46 CM3
DOP CALCLVOT PEAK VEL VTI: 16.07 CM
E WAVE DECELERATION TIME: 195.22 MSEC
E/A RATIO: 1.22
E/E' RATIO: 8.67 M/S
ECHO LV POSTERIOR WALL: 0.78 CM (ref 0.6–1.1)
EJECTION FRACTION: 55 %
FRACTIONAL SHORTENING: 25 % (ref 28–44)
INTERVENTRICULAR SEPTUM: 0.74 CM (ref 0.6–1.1)
IVRT: 89.97 MSEC
LA MAJOR: 4.19 CM
LA MINOR: 4.07 CM
LA WIDTH: 3.63 CM
LEFT ATRIUM SIZE: 3.49 CM
LEFT ATRIUM VOLUME INDEX: 24.6 ML/M2
LEFT ATRIUM VOLUME: 44.46 CM3
LEFT INTERNAL DIMENSION IN SYSTOLE: 3.37 CM (ref 2.1–4)
LEFT VENTRICLE DIASTOLIC VOLUME INDEX: 50.74 ML/M2
LEFT VENTRICLE DIASTOLIC VOLUME: 91.84 ML
LEFT VENTRICLE MASS INDEX: 59 G/M2
LEFT VENTRICLE SYSTOLIC VOLUME INDEX: 25.6 ML/M2
LEFT VENTRICLE SYSTOLIC VOLUME: 46.31 ML
LEFT VENTRICULAR INTERNAL DIMENSION IN DIASTOLE: 4.49 CM (ref 3.5–6)
LEFT VENTRICULAR MASS: 105.9 G
LV LATERAL E/E' RATIO: 7.8 M/S
LV SEPTAL E/E' RATIO: 9.75 M/S
MV PEAK A VEL: 0.64 M/S
MV PEAK E VEL: 0.78 M/S
NUC STRESS EJECTION FRACTION: 58 %
OHS CV CPX 85 PERCENT MAX PREDICTED HEART RATE MALE: 155
OHS CV CPX MAX PREDICTED HEART RATE: 182
OHS CV CPX PATIENT IS FEMALE: 0
OHS CV CPX PATIENT IS MALE: 1
OHS CV CPX PEAK DIASTOLIC BLOOD PRESSURE: 63 MMHG
OHS CV CPX PEAK HEAR RATE: 82 BPM
OHS CV CPX PEAK RATE PRESSURE PRODUCT: 8856
OHS CV CPX PEAK SYSTOLIC BLOOD PRESSURE: 108 MMHG
OHS CV CPX PERCENT MAX PREDICTED HEART RATE ACHIEVED: 45
OHS CV CPX RATE PRESSURE PRODUCT PRESENTING: 6760
PISA TR MAX VEL: 2.08 M/S
PULM VEIN S/D RATIO: 1.22
PV PEAK D VEL: 0.41 M/S
PV PEAK S VEL: 0.5 M/S
PV PEAK VELOCITY: 1.21 CM/S
RA MAJOR: 4.5 CM
RA PRESSURE: 3 MMHG
RA WIDTH: 3.5 CM
RIGHT VENTRICULAR END-DIASTOLIC DIMENSION: 2.73 CM
SINUS: 2.95 CM
STJ: 2.34 CM
SYSTOLIC BLOOD PRESSURE: 104 MMHG
TDI LATERAL: 0.1 M/S
TDI SEPTAL: 0.08 M/S
TDI: 0.09 M/S
TR MAX PG: 17 MMHG
TRICUSPID ANNULAR PLANE SYSTOLIC EXCURSION: 1.66 CM
TV REST PULMONARY ARTERY PRESSURE: 20 MMHG

## 2021-12-07 PROCEDURE — 78452 HT MUSCLE IMAGE SPECT MULT: CPT

## 2021-12-07 PROCEDURE — A9502 TC99M TETROFOSMIN: HCPCS

## 2021-12-07 PROCEDURE — 93306 ECHO (CUPID ONLY): ICD-10-PCS | Mod: 26,,, | Performed by: INTERNAL MEDICINE

## 2021-12-07 PROCEDURE — 93306 TTE W/DOPPLER COMPLETE: CPT

## 2021-12-07 PROCEDURE — 93306 TTE W/DOPPLER COMPLETE: CPT | Mod: 26,,, | Performed by: INTERNAL MEDICINE

## 2021-12-07 PROCEDURE — 78452 STRESS TEST WITH MYOCARDIAL PERFUSION (CUPID ONLY): ICD-10-PCS | Mod: 26,,, | Performed by: INTERNAL MEDICINE

## 2021-12-07 PROCEDURE — 93018 STRESS TEST WITH MYOCARDIAL PERFUSION (CUPID ONLY): ICD-10-PCS | Mod: ,,, | Performed by: INTERNAL MEDICINE

## 2021-12-07 PROCEDURE — 93016 CV STRESS TEST SUPVJ ONLY: CPT | Mod: ,,, | Performed by: INTERNAL MEDICINE

## 2021-12-07 PROCEDURE — 93017 CV STRESS TEST TRACING ONLY: CPT

## 2021-12-07 PROCEDURE — 63600175 PHARM REV CODE 636 W HCPCS: Performed by: INTERNAL MEDICINE

## 2021-12-07 PROCEDURE — 93016 STRESS TEST WITH MYOCARDIAL PERFUSION (CUPID ONLY): ICD-10-PCS | Mod: ,,, | Performed by: INTERNAL MEDICINE

## 2021-12-07 PROCEDURE — 78452 HT MUSCLE IMAGE SPECT MULT: CPT | Mod: 26,,, | Performed by: INTERNAL MEDICINE

## 2021-12-07 PROCEDURE — 93018 CV STRESS TEST I&R ONLY: CPT | Mod: ,,, | Performed by: INTERNAL MEDICINE

## 2021-12-07 RX ORDER — REGADENOSON 0.08 MG/ML
0.4 INJECTION, SOLUTION INTRAVENOUS ONCE
Status: COMPLETED | OUTPATIENT
Start: 2021-12-07 | End: 2021-12-07

## 2021-12-07 RX ADMIN — REGADENOSON 0.4 MG: 0.08 INJECTION, SOLUTION INTRAVENOUS at 08:12

## 2021-12-08 ENCOUNTER — TELEPHONE (OUTPATIENT)
Dept: CARDIOLOGY | Facility: CLINIC | Age: 36
End: 2021-12-08
Payer: MEDICARE

## 2021-12-08 ENCOUNTER — HOSPITAL ENCOUNTER (OUTPATIENT)
Dept: PREADMISSION TESTING | Facility: HOSPITAL | Age: 36
Discharge: HOME | End: 2021-12-08
Attending: UROLOGY
Payer: MEDICARE

## 2021-12-08 DIAGNOSIS — Z01.812 ENCOUNTER FOR PREOPERATIVE SCREENING LABORATORY TESTING FOR COVID-19 VIRUS: ICD-10-CM

## 2021-12-08 DIAGNOSIS — Z11.52 ENCOUNTER FOR PREOPERATIVE SCREENING LABORATORY TESTING FOR COVID-19 VIRUS: ICD-10-CM

## 2021-12-08 LAB — SARS-COV-2 RDRP RESP QL NAA+PROBE: NEGATIVE

## 2021-12-08 PROCEDURE — U0002 COVID-19 LAB TEST NON-CDC: HCPCS | Performed by: UROLOGY

## 2021-12-10 ENCOUNTER — HOSPITAL ENCOUNTER (OUTPATIENT)
Facility: HOSPITAL | Age: 36
Discharge: HOME | End: 2021-12-10
Attending: UROLOGY | Admitting: UROLOGY
Payer: MEDICARE

## 2021-12-10 ENCOUNTER — ANESTHESIA (OUTPATIENT)
Dept: SURGERY | Facility: HOSPITAL | Age: 36
End: 2021-12-10
Payer: MEDICARE

## 2021-12-10 VITALS
RESPIRATION RATE: 16 BRPM | OXYGEN SATURATION: 94 % | HEART RATE: 72 BPM | SYSTOLIC BLOOD PRESSURE: 105 MMHG | TEMPERATURE: 98 F | DIASTOLIC BLOOD PRESSURE: 61 MMHG

## 2021-12-10 DIAGNOSIS — Z01.812 ENCOUNTER FOR PREOPERATIVE SCREENING LABORATORY TESTING FOR COVID-19 VIRUS: ICD-10-CM

## 2021-12-10 DIAGNOSIS — M79.605 PAIN OF LEFT LOWER EXTREMITY: ICD-10-CM

## 2021-12-10 DIAGNOSIS — M54.50 BILATERAL LOW BACK PAIN WITHOUT SCIATICA, UNSPECIFIED CHRONICITY: ICD-10-CM

## 2021-12-10 DIAGNOSIS — Z01.818 PREOP TESTING: ICD-10-CM

## 2021-12-10 DIAGNOSIS — Z11.52 ENCOUNTER FOR PREOPERATIVE SCREENING LABORATORY TESTING FOR COVID-19 VIRUS: ICD-10-CM

## 2021-12-10 DIAGNOSIS — M25.562 LEFT KNEE PAIN, UNSPECIFIED CHRONICITY: ICD-10-CM

## 2021-12-10 DIAGNOSIS — R33.9 INCOMPLETE EMPTYING OF BLADDER: Primary | ICD-10-CM

## 2021-12-10 LAB
FINAL PATHOLOGIC DIAGNOSIS: NORMAL
GROSS: NORMAL
Lab: NORMAL

## 2021-12-10 PROCEDURE — 25000003 PHARM REV CODE 250: Performed by: UROLOGY

## 2021-12-10 PROCEDURE — 63600175 PHARM REV CODE 636 W HCPCS: Performed by: ANESTHESIOLOGY

## 2021-12-10 PROCEDURE — 64590 INS/RPL PRPH SAC/GSTR NPG/R: CPT | Mod: 51,,, | Performed by: UROLOGY

## 2021-12-10 PROCEDURE — D9220A PRA ANESTHESIA: ICD-10-PCS | Mod: CRNA,,, | Performed by: REGISTERED NURSE

## 2021-12-10 PROCEDURE — 71000033 HC RECOVERY, INTIAL HOUR: Performed by: UROLOGY

## 2021-12-10 PROCEDURE — 71000016 HC POSTOP RECOV ADDL HR: Performed by: UROLOGY

## 2021-12-10 PROCEDURE — 71000039 HC RECOVERY, EACH ADD'L HOUR: Performed by: UROLOGY

## 2021-12-10 PROCEDURE — 64581 OPN IMPLTJ NEA SACRAL NERVE: CPT | Mod: ,,, | Performed by: UROLOGY

## 2021-12-10 PROCEDURE — 25000003 PHARM REV CODE 250: Performed by: ANESTHESIOLOGY

## 2021-12-10 PROCEDURE — 36000707: Performed by: UROLOGY

## 2021-12-10 PROCEDURE — 76000 FLUOROSCOPY <1 HR PHYS/QHP: CPT | Mod: 26,59,, | Performed by: UROLOGY

## 2021-12-10 PROCEDURE — 71000015 HC POSTOP RECOV 1ST HR: Performed by: UROLOGY

## 2021-12-10 PROCEDURE — 88300 SURGICAL PATH GROSS: CPT | Mod: 26,,, | Performed by: PATHOLOGY

## 2021-12-10 PROCEDURE — 76000 PR  FLUOROSCOPE EXAMINATION: ICD-10-PCS | Mod: 26,59,, | Performed by: UROLOGY

## 2021-12-10 PROCEDURE — 37000009 HC ANESTHESIA EA ADD 15 MINS: Performed by: UROLOGY

## 2021-12-10 PROCEDURE — C1767 GENERATOR, NEURO NON-RECHARG: HCPCS | Performed by: UROLOGY

## 2021-12-10 PROCEDURE — 63600175 PHARM REV CODE 636 W HCPCS: Performed by: UROLOGY

## 2021-12-10 PROCEDURE — 64590 PR IMPLANT PERIPH/GASTRIC NEUROSTIM/RECEIVER: ICD-10-PCS | Mod: 51,,, | Performed by: UROLOGY

## 2021-12-10 PROCEDURE — D9220A PRA ANESTHESIA: Mod: CRNA,,, | Performed by: REGISTERED NURSE

## 2021-12-10 PROCEDURE — D9220A PRA ANESTHESIA: Mod: ANES,,, | Performed by: ANESTHESIOLOGY

## 2021-12-10 PROCEDURE — 37000008 HC ANESTHESIA 1ST 15 MINUTES: Performed by: UROLOGY

## 2021-12-10 PROCEDURE — 63600175 PHARM REV CODE 636 W HCPCS: Performed by: REGISTERED NURSE

## 2021-12-10 PROCEDURE — 95971 PR ANALYZE NEUROSTIM,SIMPLE/PROG: ICD-10-PCS | Mod: 59,,, | Performed by: UROLOGY

## 2021-12-10 PROCEDURE — 88300 SURGICAL PATH GROSS: CPT | Performed by: PATHOLOGY

## 2021-12-10 PROCEDURE — C1778 LEAD, NEUROSTIMULATOR: HCPCS | Performed by: UROLOGY

## 2021-12-10 PROCEDURE — 95971 ALYS SMPL SP/PN NPGT W/PRGRM: CPT | Mod: 59,,, | Performed by: UROLOGY

## 2021-12-10 PROCEDURE — D9220A PRA ANESTHESIA: ICD-10-PCS | Mod: ANES,,, | Performed by: ANESTHESIOLOGY

## 2021-12-10 PROCEDURE — 88300 PR  SURG PATH,GROSS,LEVEL I: ICD-10-PCS | Mod: 26,,, | Performed by: PATHOLOGY

## 2021-12-10 PROCEDURE — 25000003 PHARM REV CODE 250: Performed by: REGISTERED NURSE

## 2021-12-10 PROCEDURE — 64581 PR IMPLANTATION, NEUROSTIM ELECT ARRAY, OPEN, SACRAL NERVE: ICD-10-PCS | Mod: ,,, | Performed by: UROLOGY

## 2021-12-10 PROCEDURE — 36000706: Performed by: UROLOGY

## 2021-12-10 DEVICE — SYS INTERSTIM X RECHARGE FREE: Type: IMPLANTABLE DEVICE | Site: BACK | Status: FUNCTIONAL

## 2021-12-10 RX ORDER — SODIUM CHLORIDE 0.9 % (FLUSH) 0.9 %
10 SYRINGE (ML) INJECTION
Status: DISCONTINUED | OUTPATIENT
Start: 2021-12-10 | End: 2021-12-10 | Stop reason: HOSPADM

## 2021-12-10 RX ORDER — ONDANSETRON 2 MG/ML
INJECTION INTRAMUSCULAR; INTRAVENOUS
Status: DISCONTINUED | OUTPATIENT
Start: 2021-12-10 | End: 2021-12-10

## 2021-12-10 RX ORDER — HYDROMORPHONE HYDROCHLORIDE 2 MG/ML
0.2 INJECTION, SOLUTION INTRAMUSCULAR; INTRAVENOUS; SUBCUTANEOUS EVERY 5 MIN PRN
Status: DISCONTINUED | OUTPATIENT
Start: 2021-12-10 | End: 2021-12-10 | Stop reason: HOSPADM

## 2021-12-10 RX ORDER — ACETAMINOPHEN 500 MG
1000 TABLET ORAL
Status: COMPLETED | OUTPATIENT
Start: 2021-12-10 | End: 2021-12-10

## 2021-12-10 RX ORDER — LIDOCAINE HYDROCHLORIDE 10 MG/ML
1 INJECTION, SOLUTION EPIDURAL; INFILTRATION; INTRACAUDAL; PERINEURAL ONCE
Status: DISCONTINUED | OUTPATIENT
Start: 2021-12-10 | End: 2021-12-10 | Stop reason: HOSPADM

## 2021-12-10 RX ORDER — KETAMINE HYDROCHLORIDE 100 MG/ML
INJECTION, SOLUTION INTRAMUSCULAR; INTRAVENOUS
Status: DISCONTINUED | OUTPATIENT
Start: 2021-12-10 | End: 2021-12-10

## 2021-12-10 RX ORDER — SODIUM CHLORIDE, SODIUM LACTATE, POTASSIUM CHLORIDE, CALCIUM CHLORIDE 600; 310; 30; 20 MG/100ML; MG/100ML; MG/100ML; MG/100ML
INJECTION, SOLUTION INTRAVENOUS CONTINUOUS
Status: DISCONTINUED | OUTPATIENT
Start: 2021-12-10 | End: 2021-12-10 | Stop reason: HOSPADM

## 2021-12-10 RX ORDER — FENTANYL CITRATE 50 UG/ML
INJECTION, SOLUTION INTRAMUSCULAR; INTRAVENOUS
Status: DISCONTINUED | OUTPATIENT
Start: 2021-12-10 | End: 2021-12-10

## 2021-12-10 RX ORDER — GENTAMICIN SULFATE 100 MG/100ML
100 INJECTION, SOLUTION INTRAVENOUS
Status: COMPLETED | OUTPATIENT
Start: 2021-12-10 | End: 2021-12-10

## 2021-12-10 RX ORDER — DEXAMETHASONE SODIUM PHOSPHATE 4 MG/ML
INJECTION, SOLUTION INTRA-ARTICULAR; INTRALESIONAL; INTRAMUSCULAR; INTRAVENOUS; SOFT TISSUE
Status: DISCONTINUED | OUTPATIENT
Start: 2021-12-10 | End: 2021-12-10

## 2021-12-10 RX ORDER — HYDROCODONE BITARTRATE AND ACETAMINOPHEN 7.5; 325 MG/1; MG/1
1 TABLET ORAL EVERY 6 HOURS PRN
Qty: 28 TABLET | Refills: 0 | Status: ON HOLD | OUTPATIENT
Start: 2021-12-10 | End: 2022-03-30 | Stop reason: SDUPTHER

## 2021-12-10 RX ORDER — GENTAMICIN SULFATE 40 MG/ML
INJECTION, SOLUTION INTRAMUSCULAR; INTRAVENOUS
Status: DISCONTINUED | OUTPATIENT
Start: 2021-12-10 | End: 2021-12-10 | Stop reason: HOSPADM

## 2021-12-10 RX ORDER — LIDOCAINE HYDROCHLORIDE 10 MG/ML
INJECTION, SOLUTION EPIDURAL; INFILTRATION; INTRACAUDAL; PERINEURAL
Status: DISCONTINUED | OUTPATIENT
Start: 2021-12-10 | End: 2021-12-10 | Stop reason: HOSPADM

## 2021-12-10 RX ORDER — BUPIVACAINE HYDROCHLORIDE 5 MG/ML
INJECTION, SOLUTION PERINEURAL
Status: DISCONTINUED | OUTPATIENT
Start: 2021-12-10 | End: 2021-12-10 | Stop reason: HOSPADM

## 2021-12-10 RX ORDER — VANCOMYCIN HCL IN 5 % DEXTROSE 1G/250ML
1000 PLASTIC BAG, INJECTION (ML) INTRAVENOUS
Status: COMPLETED | OUTPATIENT
Start: 2021-12-10 | End: 2021-12-10

## 2021-12-10 RX ORDER — EPHEDRINE SULFATE 50 MG/ML
INJECTION, SOLUTION INTRAVENOUS
Status: DISCONTINUED | OUTPATIENT
Start: 2021-12-10 | End: 2021-12-10

## 2021-12-10 RX ORDER — HYDROCODONE BITARTRATE AND ACETAMINOPHEN 7.5; 325 MG/1; MG/1
1 TABLET ORAL DAILY PRN
Qty: 28 TABLET | Refills: 0 | Status: SHIPPED | OUTPATIENT
Start: 2021-12-10 | End: 2021-12-10 | Stop reason: SDUPTHER

## 2021-12-10 RX ORDER — SUCCINYLCHOLINE CHLORIDE 20 MG/ML
INJECTION INTRAMUSCULAR; INTRAVENOUS
Status: DISCONTINUED | OUTPATIENT
Start: 2021-12-10 | End: 2021-12-10

## 2021-12-10 RX ORDER — LIDOCAINE HYDROCHLORIDE 20 MG/ML
INJECTION INTRAVENOUS
Status: DISCONTINUED | OUTPATIENT
Start: 2021-12-10 | End: 2021-12-10

## 2021-12-10 RX ORDER — CEPHALEXIN 500 MG/1
500 CAPSULE ORAL EVERY 8 HOURS
Qty: 15 CAPSULE | Refills: 0 | Status: SHIPPED | OUTPATIENT
Start: 2021-12-10 | End: 2021-12-15

## 2021-12-10 RX ORDER — PROPOFOL 10 MG/ML
VIAL (ML) INTRAVENOUS
Status: DISCONTINUED | OUTPATIENT
Start: 2021-12-10 | End: 2021-12-10

## 2021-12-10 RX ORDER — PHENYLEPHRINE HYDROCHLORIDE 10 MG/ML
INJECTION INTRAVENOUS
Status: DISCONTINUED | OUTPATIENT
Start: 2021-12-10 | End: 2021-12-10

## 2021-12-10 RX ORDER — LORAZEPAM 2 MG/ML
0.25 INJECTION INTRAMUSCULAR EVERY 10 MIN PRN
Status: COMPLETED | OUTPATIENT
Start: 2021-12-10 | End: 2021-12-10

## 2021-12-10 RX ORDER — HYDROCODONE BITARTRATE AND ACETAMINOPHEN 5; 325 MG/1; MG/1
1 TABLET ORAL EVERY 4 HOURS PRN
Status: DISCONTINUED | OUTPATIENT
Start: 2021-12-10 | End: 2021-12-10 | Stop reason: HOSPADM

## 2021-12-10 RX ORDER — HYDROCODONE BITARTRATE AND ACETAMINOPHEN 10; 325 MG/1; MG/1
1 TABLET ORAL EVERY 4 HOURS PRN
Status: DISCONTINUED | OUTPATIENT
Start: 2021-12-10 | End: 2021-12-10 | Stop reason: HOSPADM

## 2021-12-10 RX ORDER — MIDAZOLAM HYDROCHLORIDE 1 MG/ML
INJECTION INTRAMUSCULAR; INTRAVENOUS
Status: DISCONTINUED | OUTPATIENT
Start: 2021-12-10 | End: 2021-12-10

## 2021-12-10 RX ADMIN — ONDANSETRON 4 MG: 2 INJECTION, SOLUTION INTRAMUSCULAR; INTRAVENOUS at 09:12

## 2021-12-10 RX ADMIN — PHENYLEPHRINE HYDROCHLORIDE 100 MCG: 10 INJECTION INTRAVENOUS at 08:12

## 2021-12-10 RX ADMIN — HYDROMORPHONE HYDROCHLORIDE 0.2 MG: 2 INJECTION, SOLUTION INTRAMUSCULAR; INTRAVENOUS; SUBCUTANEOUS at 10:12

## 2021-12-10 RX ADMIN — GLYCOPYRROLATE 0.1 MG: 0.2 INJECTION, SOLUTION INTRAMUSCULAR; INTRAVITREAL at 08:12

## 2021-12-10 RX ADMIN — LORAZEPAM 0.25 MG: 2 INJECTION INTRAMUSCULAR; INTRAVENOUS at 10:12

## 2021-12-10 RX ADMIN — MIDAZOLAM HYDROCHLORIDE 2 MG: 1 INJECTION INTRAMUSCULAR; INTRAVENOUS at 08:12

## 2021-12-10 RX ADMIN — VANCOMYCIN HYDROCHLORIDE 1000 MG: 1 INJECTION, POWDER, LYOPHILIZED, FOR SOLUTION INTRAVENOUS at 08:12

## 2021-12-10 RX ADMIN — EPHEDRINE SULFATE 10 MG: 50 INJECTION INTRAVENOUS at 08:12

## 2021-12-10 RX ADMIN — LIDOCAINE HYDROCHLORIDE 100 MG: 20 INJECTION, SOLUTION INTRAVENOUS at 08:12

## 2021-12-10 RX ADMIN — KETAMINE HYDROCHLORIDE 10 MG: 100 INJECTION, SOLUTION, CONCENTRATE INTRAMUSCULAR; INTRAVENOUS at 08:12

## 2021-12-10 RX ADMIN — DEXAMETHASONE SODIUM PHOSPHATE 4 MG: 4 INJECTION, SOLUTION INTRAMUSCULAR; INTRAVENOUS at 08:12

## 2021-12-10 RX ADMIN — GENTAMICIN SULFATE 100 MG: 100 INJECTION, SOLUTION INTRAVENOUS at 08:12

## 2021-12-10 RX ADMIN — KETAMINE HYDROCHLORIDE 20 MG: 100 INJECTION, SOLUTION, CONCENTRATE INTRAMUSCULAR; INTRAVENOUS at 09:12

## 2021-12-10 RX ADMIN — HYDROMORPHONE HYDROCHLORIDE 0.2 MG: 2 INJECTION, SOLUTION INTRAMUSCULAR; INTRAVENOUS; SUBCUTANEOUS at 11:12

## 2021-12-10 RX ADMIN — PHENYLEPHRINE HYDROCHLORIDE 50 MCG: 10 INJECTION INTRAVENOUS at 08:12

## 2021-12-10 RX ADMIN — FENTANYL CITRATE 25 MCG: 50 INJECTION, SOLUTION INTRAMUSCULAR; INTRAVENOUS at 08:12

## 2021-12-10 RX ADMIN — PROPOFOL 200 MG: 10 INJECTION, EMULSION INTRAVENOUS at 08:12

## 2021-12-10 RX ADMIN — SODIUM CHLORIDE, SODIUM LACTATE, POTASSIUM CHLORIDE, AND CALCIUM CHLORIDE: .6; .31; .03; .02 INJECTION, SOLUTION INTRAVENOUS at 07:12

## 2021-12-10 RX ADMIN — FENTANYL CITRATE 100 MCG: 50 INJECTION, SOLUTION INTRAMUSCULAR; INTRAVENOUS at 08:12

## 2021-12-10 RX ADMIN — KETAMINE HYDROCHLORIDE 20 MG: 100 INJECTION, SOLUTION, CONCENTRATE INTRAMUSCULAR; INTRAVENOUS at 08:12

## 2021-12-10 RX ADMIN — SUCCINYLCHOLINE CHLORIDE 100 MG: 20 INJECTION, SOLUTION INTRAMUSCULAR; INTRAVENOUS at 08:12

## 2021-12-10 RX ADMIN — ACETAMINOPHEN 1000 MG: 500 TABLET ORAL at 07:12

## 2021-12-13 ENCOUNTER — TELEPHONE (OUTPATIENT)
Dept: UROLOGY | Facility: CLINIC | Age: 36
End: 2021-12-13
Payer: MEDICARE

## 2021-12-18 ENCOUNTER — HOSPITAL ENCOUNTER (EMERGENCY)
Facility: HOSPITAL | Age: 36
Discharge: HOME | End: 2021-12-18
Attending: EMERGENCY MEDICINE
Payer: MEDICARE

## 2021-12-18 VITALS
SYSTOLIC BLOOD PRESSURE: 136 MMHG | OXYGEN SATURATION: 98 % | WEIGHT: 166 LBS | DIASTOLIC BLOOD PRESSURE: 75 MMHG | BODY MASS INDEX: 28.34 KG/M2 | TEMPERATURE: 99 F | RESPIRATION RATE: 16 BRPM | HEART RATE: 83 BPM | HEIGHT: 64 IN
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
## 2021-12-18 DIAGNOSIS — S60.449A CONSTRICTIVE JEWELRY OF FINGER, INITIAL ENCOUNTER: Primary | ICD-10-CM

## 2021-12-18 PROCEDURE — 99283 EMERGENCY DEPT VISIT LOW MDM: CPT

## 2021-12-27 ENCOUNTER — OFFICE VISIT (OUTPATIENT)
Dept: UROLOGY | Facility: CLINIC | Age: 36
End: 2021-12-27
Payer: MEDICARE

## 2021-12-27 VITALS — RESPIRATION RATE: 18 BRPM | WEIGHT: 162.69 LBS | HEIGHT: 64 IN | BODY MASS INDEX: 27.77 KG/M2

## 2021-12-27 DIAGNOSIS — R33.9 INCOMPLETE EMPTYING OF BLADDER: Primary | ICD-10-CM

## 2021-12-27 PROCEDURE — 1160F PR REVIEW ALL MEDS BY PRESCRIBER/CLIN PHARMACIST DOCUMENTED: ICD-10-PCS | Mod: CPTII,S$GLB,, | Performed by: UROLOGY

## 2021-12-27 PROCEDURE — 99024 PR POST-OP FOLLOW-UP VISIT: ICD-10-PCS | Mod: S$GLB,,, | Performed by: UROLOGY

## 2021-12-27 PROCEDURE — 99999 PR PBB SHADOW E&M-EST. PATIENT-LVL III: CPT | Mod: PBBFAC,,, | Performed by: UROLOGY

## 2021-12-27 PROCEDURE — 1160F RVW MEDS BY RX/DR IN RCRD: CPT | Mod: CPTII,S$GLB,, | Performed by: UROLOGY

## 2021-12-27 PROCEDURE — 99999 PR PBB SHADOW E&M-EST. PATIENT-LVL III: ICD-10-PCS | Mod: PBBFAC,,, | Performed by: UROLOGY

## 2021-12-27 PROCEDURE — 3008F PR BODY MASS INDEX (BMI) DOCUMENTED: ICD-10-PCS | Mod: CPTII,S$GLB,, | Performed by: UROLOGY

## 2021-12-27 PROCEDURE — 99024 POSTOP FOLLOW-UP VISIT: CPT | Mod: S$GLB,,, | Performed by: UROLOGY

## 2021-12-27 PROCEDURE — 1159F PR MEDICATION LIST DOCUMENTED IN MEDICAL RECORD: ICD-10-PCS | Mod: CPTII,S$GLB,, | Performed by: UROLOGY

## 2021-12-27 PROCEDURE — 1159F MED LIST DOCD IN RCRD: CPT | Mod: CPTII,S$GLB,, | Performed by: UROLOGY

## 2021-12-27 PROCEDURE — 3008F BODY MASS INDEX DOCD: CPT | Mod: CPTII,S$GLB,, | Performed by: UROLOGY

## 2021-12-27 RX ORDER — SULFAMETHOXAZOLE AND TRIMETHOPRIM 800; 160 MG/1; MG/1
1 TABLET ORAL 2 TIMES DAILY
Qty: 20 TABLET | Refills: 0 | Status: SHIPPED | OUTPATIENT
Start: 2021-12-27 | End: 2022-01-06

## 2021-12-28 LAB
CTP QC/QA: YES
CTP QC/QA: YES
POC MOLECULAR INFLUENZA A AGN: NEGATIVE
POC MOLECULAR INFLUENZA B AGN: NEGATIVE
SARS-COV-2 RDRP RESP QL NAA+PROBE: POSITIVE

## 2021-12-28 PROCEDURE — U0002 COVID-19 LAB TEST NON-CDC: HCPCS | Performed by: EMERGENCY MEDICINE

## 2021-12-28 PROCEDURE — 87502 INFLUENZA DNA AMP PROBE: CPT

## 2021-12-28 PROCEDURE — 99284 EMERGENCY DEPT VISIT MOD MDM: CPT | Mod: 25

## 2021-12-29 ENCOUNTER — HOSPITAL ENCOUNTER (EMERGENCY)
Facility: HOSPITAL | Age: 36
Discharge: HOME | End: 2021-12-29
Attending: EMERGENCY MEDICINE
Payer: MEDICARE

## 2021-12-29 VITALS
HEART RATE: 103 BPM | SYSTOLIC BLOOD PRESSURE: 156 MMHG | RESPIRATION RATE: 20 BRPM | TEMPERATURE: 100 F | DIASTOLIC BLOOD PRESSURE: 103 MMHG | OXYGEN SATURATION: 98 %

## 2021-12-29 DIAGNOSIS — U07.1 COVID-19 VIRUS INFECTION: Primary | ICD-10-CM

## 2021-12-29 PROCEDURE — 25000003 PHARM REV CODE 250: Performed by: PHYSICIAN ASSISTANT

## 2021-12-29 RX ORDER — ACETAMINOPHEN 500 MG
500 TABLET ORAL EVERY 6 HOURS PRN
Qty: 30 TABLET | Refills: 0 | Status: SHIPPED | OUTPATIENT
Start: 2021-12-29

## 2021-12-29 RX ORDER — IBUPROFEN 400 MG/1
800 TABLET ORAL
Status: COMPLETED | OUTPATIENT
Start: 2021-12-29 | End: 2021-12-29

## 2021-12-29 RX ORDER — IBUPROFEN 600 MG/1
600 TABLET ORAL EVERY 6 HOURS PRN
Qty: 30 TABLET | Refills: 0 | OUTPATIENT
Start: 2021-12-29 | End: 2022-02-23

## 2021-12-29 RX ORDER — ACETAMINOPHEN 500 MG
1000 TABLET ORAL
Status: COMPLETED | OUTPATIENT
Start: 2021-12-29 | End: 2021-12-29

## 2021-12-29 RX ADMIN — ACETAMINOPHEN 1000 MG: 500 TABLET ORAL at 12:12

## 2021-12-29 RX ADMIN — IBUPROFEN 800 MG: 400 TABLET, FILM COATED ORAL at 12:12

## 2021-12-29 NOTE — Clinical Note
"Galindo HERNANDEZ"Sandeep Grant was seen and treated in our emergency department on 12/28/2021.     COVID-19 is present in our communities across the state. There is limited testing for COVID at this time, so not all patients can be tested. In this situation, your employee meets the following criteria:    Galindo Grant has met the criteria for COVID-19 testing and has a POSITIVE result. He can return to work once they are asymptomatic for 72 hours without the use of fever reducing medications AND at least ten days from the first positive result.     If you have any questions or concerns, or if I can be of further assistance, please do not hesitate to contact me.    Sincerely,             Jose Luis Oconnor PA-C"

## 2021-12-29 NOTE — Clinical Note
Etta Videsne accompanied their spouse to the emergency department on 12/28/2021. They may return to work on 01/05/2022.      If you have any questions or concerns, please don't hesitate to call.      Jose Luis Oconnor PA-C

## 2021-12-29 NOTE — ED PROVIDER NOTES
Encounter Date: 12/28/2021       History     Chief Complaint   Patient presents with    Fever     Fever 102.8 at home since yesterday.     Chief Complaint: Fever  History of  Present Illness: History obtained from patient. This 36 y.o. male who has past medical history of neurofibromatosis presents to the ED complaining of fever with max temp of 102.8° F at home yesterday with associated body aches.  Denies cough, congestion, rhinorrhea, sore throat, shortness of breath, nausea, vomiting, diarrhea.  No known sick contacts.  Patient tender treatment with ibuprofen with temporary relief of symptoms.        Review of patient's allergies indicates:  No Known Allergies  Past Medical History:   Diagnosis Date    Arthritis     in knee    Dysphagia, oropharyngeal 4/1/2016    Headache(784.0)     Irregular heart beat     Mass of larynx 10/2/2012    Neurofibromatosis syndrome     Neurofibromatosis, type 1 (von Recklinghausen's disease) birth    Neurofibromatosis, type 1 (von Recklinghausen's disease) 7/9/2012     Past Surgical History:   Procedure Laterality Date    ARM DEBRIDEMENT      NF - right    partial supraglottic laryngectomy  6/15/2012    REPLACEMENT OF SACRAL NERVE STIMULATOR N/A 12/10/2021    Procedure: REPLACEMENT, NEUROSTIMULATOR, SACRAL;  Surgeon: KIP Shipley MD;  Location: [facility] OR;  Service: Urology;  Laterality: N/A;  [employer]  MICHELL SMITH [phone] WILL BE HERE FOR THE CASE PER HANANE ON 12-1-2021 WILL BE HERE  RN Pre Op 11-23-21.  ---COVID NEGATIVE ON 12/8----- C A  IMPLANTS ORDERED ON 12-1-2021     Family History   Problem Relation Age of Onset    Cancer Father         neuro fibroma mitosis    Cancer Paternal Uncle         neuro fibroma mitosis    Cancer Cousin         neuro fibroma mitosis     Social History     Tobacco Use    Smoking status: Former Smoker    Smokeless tobacco: Never Used   Substance Use Topics    Alcohol use: No    Drug use: No     Review of Systems    Constitutional: Positive for fever. Negative for chills.   HENT: Negative for congestion, rhinorrhea and sore throat.    Eyes: Negative for visual disturbance.   Respiratory: Negative for cough and shortness of breath.    Cardiovascular: Negative for chest pain.   Gastrointestinal: Negative for abdominal pain, diarrhea, nausea and vomiting.   Genitourinary: Negative for dysuria, frequency and hematuria.   Musculoskeletal: Positive for myalgias. Negative for back pain.   Skin: Negative for rash.   Neurological: Negative for dizziness, weakness and headaches.       Physical Exam     Initial Vitals [12/28/21 2255]   BP Pulse Resp Temp SpO2   (!) 156/103 103 20 (!) 100.4 °F (38 °C) 98 %      MAP       --         Physical Exam    Nursing note and vitals reviewed.  Constitutional: He appears well-developed and well-nourished. No distress.   HENT:   Head: Normocephalic and atraumatic.   Right Ear: Tympanic membrane normal.   Left Ear: Tympanic membrane normal.   Nose: Nose normal.   Mouth/Throat: Uvula is midline, oropharynx is clear and moist and mucous membranes are normal.   Eyes: EOM are normal. Pupils are equal, round, and reactive to light.   Neck: Trachea normal and phonation normal. Neck supple. No stridor present.   Normal range of motion.   Full passive range of motion without pain.     Cardiovascular: Normal rate, regular rhythm and normal heart sounds. Exam reveals no gallop and no friction rub.    No murmur heard.  Pulmonary/Chest: Effort normal and breath sounds normal. No respiratory distress. He has no wheezes. He has no rhonchi. He has no rales.   Abdominal: Abdomen is soft. Bowel sounds are normal. He exhibits no mass. There is no abdominal tenderness. There is no rebound and no guarding.   Musculoskeletal:         General: Normal range of motion.      Cervical back: Full passive range of motion without pain, normal range of motion and neck supple. No rigidity. No spinous process tenderness or muscular  tenderness. Normal range of motion.     Neurological: He is alert and oriented to person, place, and time. He has normal strength. No cranial nerve deficit or sensory deficit.   Skin: Skin is warm and dry. Capillary refill takes less than 2 seconds.   Psychiatric: He has a normal mood and affect.         ED Course   Procedures  Labs Reviewed   SARS-COV-2 RDRP GENE - Abnormal; Notable for the following components:       Result Value    POC Rapid COVID Positive (*)     All other components within normal limits   POCT INFLUENZA A/B MOLECULAR - Normal          Imaging Results    None          Medications   ibuprofen tablet 800 mg (800 mg Oral Given 12/29/21 0046)   acetaminophen tablet 1,000 mg (1,000 mg Oral Given 12/29/21 0046)     Medical Decision Making:   ED Management:  36 y.o. patient presenting with constellation of symptoms most c/w COVID 19 with a positive COVID 19 test.     Also considered but less likely:  PTA/RPA: no hot potato voice, no uvular deviation,  Esophageal rupture: No history of dysphagia  Unlikely deep space infection/Nenos  Low suspicion for CNS infection or bacterial sinusitis given exam and history.    No respiratory distress, otherwise relatively well appearing and nontoxic. Ambulatory O2 sats of 98% and patient in no acute distress. Return precautions given, patient understands and agrees with plan. All questions answered.  Instructed to follow up with PCP. There is currently no accepted treatment except conservative measures and respiratory support if appropriate.  This patient will be discharged home with strict return precautions if worsening respiratory status.  Patient was made aware other resource limitations and the fact that they could have worsening symptoms.  Patient was informed to quarantine themselves for per guidelines. Covid risk score of 2. I will not order the infusion due to the Covid risk score.                               Clinical Impression:   Final  diagnoses:  [U07.1] COVID-19 virus infection (Primary)          ED Disposition Condition    Discharge Stable        ED Prescriptions     Medication Sig Dispense Start Date End Date Auth. Provider    ibuprofen (ADVIL,MOTRIN) 600 MG tablet Take 1 tablet (600 mg total) by mouth every 6 (six) hours as needed for Pain. 30 tablet 12/29/2021  Jose Luis Oconnor PA-C    acetaminophen (TYLENOL) 500 MG tablet Take 1 tablet (500 mg total) by mouth every 6 (six) hours as needed for Pain. 30 tablet 12/29/2021  Jose Luis Oconnor PA-C        Follow-up Information     Follow up With Specialties Details Why Contact Info    Liyah Spring,  Family Medicine   [address] [facility] [address]  [phone]      [facility] - Emergency Dept Emergency Medicine Go in 1 day If symptoms worsen [address]  [facility] [address] [phone]           Jose Luis Oconnor PA-C  12/29/21 0047

## 2021-12-30 ENCOUNTER — HOSPITAL ENCOUNTER (EMERGENCY)
Facility: HOSPITAL | Age: 36
Discharge: HOME | End: 2021-12-30
Attending: EMERGENCY MEDICINE
Payer: MEDICARE

## 2021-12-30 ENCOUNTER — TELEPHONE (OUTPATIENT)
Dept: UROLOGY | Facility: CLINIC | Age: 36
End: 2021-12-30
Payer: MEDICARE

## 2021-12-30 VITALS
HEIGHT: 64 IN | DIASTOLIC BLOOD PRESSURE: 77 MMHG | BODY MASS INDEX: 28.34 KG/M2 | RESPIRATION RATE: 18 BRPM | TEMPERATURE: 98 F | OXYGEN SATURATION: 97 % | SYSTOLIC BLOOD PRESSURE: 122 MMHG | HEART RATE: 77 BPM | WEIGHT: 166 LBS

## 2021-12-30 DIAGNOSIS — Z48.89 ENCOUNTER FOR POST SURGICAL WOUND CHECK: Primary | ICD-10-CM

## 2021-12-30 PROCEDURE — 87070 CULTURE OTHR SPECIMN AEROBIC: CPT | Performed by: PHYSICIAN ASSISTANT

## 2021-12-30 PROCEDURE — 87077 CULTURE AEROBIC IDENTIFY: CPT | Performed by: PHYSICIAN ASSISTANT

## 2021-12-30 PROCEDURE — 87186 SC STD MICRODIL/AGAR DIL: CPT | Performed by: PHYSICIAN ASSISTANT

## 2021-12-30 PROCEDURE — 99283 EMERGENCY DEPT VISIT LOW MDM: CPT | Mod: 25

## 2021-12-30 PROCEDURE — 25000003 PHARM REV CODE 250: Performed by: PHYSICIAN ASSISTANT

## 2021-12-30 RX ORDER — MUPIROCIN 20 MG/G
OINTMENT TOPICAL 3 TIMES DAILY
Qty: 30 G | Refills: 0 | Status: SHIPPED | OUTPATIENT
Start: 2021-12-30

## 2021-12-30 RX ORDER — MUPIROCIN 20 MG/G
1 OINTMENT TOPICAL
Status: COMPLETED | OUTPATIENT
Start: 2021-12-30 | End: 2021-12-30

## 2021-12-30 RX ADMIN — MUPIROCIN 22 G: 20 OINTMENT TOPICAL at 02:12

## 2021-12-30 NOTE — DISCHARGE INSTRUCTIONS
Keep incision site clean and dry (no showers). Change dressing daily and apply antibiotic ointment at each dressing change.  Continue Bactrim until completely finished.  Follow-up with Dr. Shipley on Monday or Tuesday for a wound recheck.  Return to the ED for any concerning symptoms.    Our goal in the emergency department is to always give you outstanding care and exceptional service. You may receive a survey by mail or e-mail in the next week regarding your experience in our ED. We would greatly appreciate your completing and returning the survey. Your feedback provides us with a way to recognize our staff who give very good care and it helps us learn how to improve when your experience was below our aspiration of excellence.

## 2021-12-30 NOTE — ED PROVIDER NOTES
Encounter Date: 12/30/2021    SCRIBE #1 NOTE: I, Bebe Sims, am scribing for, and in the presence of,  Barbara Pan PA-C. I have scribed the following portions of the note - Other sections scribed: HPI, ROS, PE.       History     Chief Complaint   Patient presents with    Wound Check     Pt reports pain and drainage from surgical site.  Had bladder stimulator placed.  Since patient is Covid +, surgeon told him to come to the ER.    COVID-19 Concerns     Pt covid + times 2 days.  No complaints at this time.      Galindo Grant is a 36 y.o male with a PMHx of neurofibromatosis presenting to the ED with a chief complaint of wound drianage beginning this morning. The patient reports having a bladder stimulator placed on 12/10/21 by Dr. Shipley. States that he has been on antibiotics for the past several days but states that today he noticed pus and drainage from one of his incisions. He was instructed to come to the ED due to his COVID positive status. States he is not vaccinated for COVID-19. Denies fever, chills, and cough.     The history is provided by the patient. No  was used.     Review of patient's allergies indicates:  No Known Allergies  Past Medical History:   Diagnosis Date    Arthritis     in knee    Dysphagia, oropharyngeal 4/1/2016    Headache(784.0)     Irregular heart beat     Mass of larynx 10/2/2012    Neurofibromatosis syndrome     Neurofibromatosis, type 1 (von Recklinghausen's disease) birth    Neurofibromatosis, type 1 (von Recklinghausen's disease) 7/9/2012     Past Surgical History:   Procedure Laterality Date    ARM DEBRIDEMENT      NF - right    partial supraglottic laryngectomy  6/15/2012    REPLACEMENT OF SACRAL NERVE STIMULATOR N/A 12/10/2021    Procedure: REPLACEMENT, NEUROSTIMULATOR, SACRAL;  Surgeon: KIP Shipley MD;  Location: [facility] OR;  Service: Urology;  Laterality: N/A;  [employer]  MICHELL SMITH [phone] WILL BE HERE FOR THE CASE PER  HANANE ON 12-1-2021 WILL BE HERE  RN Pre Op 11-23-21.  ---COVID NEGATIVE ON 12/8----- C A  IMPLANTS ORDERED ON 12-1-2021     Family History   Problem Relation Age of Onset    Cancer Father         neuro fibroma mitosis    Cancer Paternal Uncle         neuro fibroma mitosis    Cancer Cousin         neuro fibroma mitosis     Social History     Tobacco Use    Smoking status: Former Smoker    Smokeless tobacco: Never Used   Substance Use Topics    Alcohol use: No    Drug use: No     Review of Systems   Constitutional: Negative for chills and fever.   HENT: Negative for sore throat.    Eyes: Negative for visual disturbance.   Respiratory: Negative for cough and shortness of breath.    Cardiovascular: Negative for chest pain.   Gastrointestinal: Negative for abdominal pain, diarrhea, nausea and vomiting.   Genitourinary: Negative for dysuria.   Musculoskeletal: Negative for neck pain.   Skin: Positive for wound (pus from incision site). Negative for rash.   Allergic/Immunologic: Negative for immunocompromised state.   Neurological: Negative for headaches.   Psychiatric/Behavioral: Negative for dysphoric mood.       Physical Exam     Initial Vitals [12/30/21 1309]   BP Pulse Resp Temp SpO2   119/68 83 16 98.1 °F (36.7 °C) 96 %      MAP       --         Physical Exam    Nursing note and vitals reviewed.  Constitutional: He appears well-developed and well-nourished. He is not diaphoretic. No distress.   HENT:   Head: Normocephalic and atraumatic.   Mouth/Throat: Oropharynx is clear and moist. No oropharyngeal exudate.   Eyes: Conjunctivae and EOM are normal. Pupils are equal, round, and reactive to light.   Neck: Neck supple.   Normal range of motion.  Cardiovascular: Normal rate, regular rhythm, normal heart sounds and intact distal pulses.   Pulmonary/Chest: Breath sounds normal. No respiratory distress. He has no wheezes. He has no rales.   Musculoskeletal:         General: No tenderness or edema. Normal range of  motion.      Cervical back: Normal range of motion and neck supple.      Comments: 1 cm surgical incision noted left of lumbar spine. Slow oozing of thin, yellow fluid noted. The area is non-tender. No fluctuance. Incision is non-erythematous.     Neurological: He is alert and oriented to person, place, and time. GCS score is 15. GCS eye subscore is 4. GCS verbal subscore is 5. GCS motor subscore is 6.   Skin: Skin is warm and dry.   Psychiatric: He has a normal mood and affect. Thought content normal.             ED Course   Procedures  Labs Reviewed   CULTURE, AEROBIC  (SPECIFY SOURCE)          Imaging Results    None          Medications   mupirocin 2 % ointment 22 g (22 g Topical (Top) Given 12/30/21 5718)     Medical Decision Making:   History:   Old Medical Records: I decided to obtain old medical records.  Initial Assessment:   Patient is a 36-yo male here for wound check of surgical site.  He is status post bladder stimulator placement on 12/10/2021 with Dr. Shipley of Urology.  Reports oozing of fluid starting today.  He is currently on Bactrim and endorses compliance.  Differential Diagnosis:   Cellulitis, cutaneous abscess, epidural abscess  Other:   I have discussed this case with another health care provider.       <> Summary of the Discussion: Discussed case with Dr. Shipley, Urology, who recommended continuation of Bactrim, topical abx ointment, daily dressing changes and keep wound dry (no showers). This was communicated to the patient with full understanding. He will f/u in urology clinic on 1/3 for a wound recheck.          Scribe Attestation:   Scribe #1: I performed the above scribed service and the documentation accurately describes the services I performed. I attest to the accuracy of the note.                 Clinical Impression:   Final diagnoses:  [Z48.89] Encounter for post surgical wound check (Primary)        I, Barbara Pan PA-C, personally performed the services described in this  documentation. All medical record entries made by the scribe were at my direction and in my presence. I have reviewed the chart and agree that the record reflects my personal performance and is accurate and complete.       ED Disposition Condition    Discharge Stable        ED Prescriptions     Medication Sig Dispense Start Date End Date Auth. Provider    mupirocin (BACTROBAN) 2 % ointment Apply topically 3 (three) times daily. 30 g 12/30/2021  Barbara Pan PA-C        Follow-up Information     Follow up With Specialties Details Why Contact Info    KIP Shipley MD Urology Schedule an appointment as soon as possible for a visit on 1/3/2022  [address]  [address]  [facility] [address]  [phone]      [facility] - Emergency Dept Emergency Medicine  If symptoms worsen [address]  [facility] [address]  [phone]           Barbara Pan PA-C  12/30/21 2014

## 2021-12-30 NOTE — ED TRIAGE NOTES
Pt. Recently had a bladder stimulator put in and he wanted the area of placement checked to make sure it is healing properly.

## 2022-01-01 ENCOUNTER — PATIENT MESSAGE (OUTPATIENT)
Dept: UROLOGY | Facility: CLINIC | Age: 37
End: 2022-01-01
Payer: MEDICARE

## 2022-01-01 LAB — BACTERIA SPEC AEROBE CULT: ABNORMAL

## 2022-01-03 ENCOUNTER — OFFICE VISIT (OUTPATIENT)
Dept: UROLOGY | Facility: CLINIC | Age: 37
End: 2022-01-03
Payer: MEDICARE

## 2022-01-03 VITALS — BODY MASS INDEX: 29.88 KG/M2 | WEIGHT: 175 LBS | HEIGHT: 64 IN

## 2022-01-03 DIAGNOSIS — U07.1 COVID-19 VIRUS DETECTED: ICD-10-CM

## 2022-01-03 DIAGNOSIS — R33.9 INCOMPLETE EMPTYING OF BLADDER: Primary | ICD-10-CM

## 2022-01-03 PROCEDURE — 99999 PR PBB SHADOW E&M-EST. PATIENT-LVL II: ICD-10-PCS | Mod: PBBFAC,,, | Performed by: UROLOGY

## 2022-01-03 PROCEDURE — 99024 PR POST-OP FOLLOW-UP VISIT: ICD-10-PCS | Mod: S$GLB,,, | Performed by: UROLOGY

## 2022-01-03 PROCEDURE — 99999 PR PBB SHADOW E&M-EST. PATIENT-LVL II: CPT | Mod: PBBFAC,,, | Performed by: UROLOGY

## 2022-01-03 PROCEDURE — 3008F PR BODY MASS INDEX (BMI) DOCUMENTED: ICD-10-PCS | Mod: CPTII,S$GLB,, | Performed by: UROLOGY

## 2022-01-03 PROCEDURE — 3008F BODY MASS INDEX DOCD: CPT | Mod: CPTII,S$GLB,, | Performed by: UROLOGY

## 2022-01-03 PROCEDURE — 99024 POSTOP FOLLOW-UP VISIT: CPT | Mod: S$GLB,,, | Performed by: UROLOGY

## 2022-01-10 ENCOUNTER — OFFICE VISIT (OUTPATIENT)
Dept: UROLOGY | Facility: CLINIC | Age: 37
End: 2022-01-10
Payer: MEDICARE

## 2022-01-10 VITALS — BODY MASS INDEX: 28.38 KG/M2 | HEIGHT: 64 IN | WEIGHT: 166.25 LBS

## 2022-01-10 DIAGNOSIS — R33.9 INCOMPLETE EMPTYING OF BLADDER: Primary | ICD-10-CM

## 2022-01-10 PROCEDURE — 1159F MED LIST DOCD IN RCRD: CPT | Mod: CPTII,S$GLB,, | Performed by: UROLOGY

## 2022-01-10 PROCEDURE — 99999 PR PBB SHADOW E&M-EST. PATIENT-LVL III: ICD-10-PCS | Mod: PBBFAC,,, | Performed by: UROLOGY

## 2022-01-10 PROCEDURE — 99024 PR POST-OP FOLLOW-UP VISIT: ICD-10-PCS | Mod: S$GLB,,, | Performed by: UROLOGY

## 2022-01-10 PROCEDURE — 3008F BODY MASS INDEX DOCD: CPT | Mod: CPTII,S$GLB,, | Performed by: UROLOGY

## 2022-01-10 PROCEDURE — 99999 PR PBB SHADOW E&M-EST. PATIENT-LVL III: CPT | Mod: PBBFAC,,, | Performed by: UROLOGY

## 2022-01-10 PROCEDURE — 1160F PR REVIEW ALL MEDS BY PRESCRIBER/CLIN PHARMACIST DOCUMENTED: ICD-10-PCS | Mod: CPTII,S$GLB,, | Performed by: UROLOGY

## 2022-01-10 PROCEDURE — 1159F PR MEDICATION LIST DOCUMENTED IN MEDICAL RECORD: ICD-10-PCS | Mod: CPTII,S$GLB,, | Performed by: UROLOGY

## 2022-01-10 PROCEDURE — 1160F RVW MEDS BY RX/DR IN RCRD: CPT | Mod: CPTII,S$GLB,, | Performed by: UROLOGY

## 2022-01-10 PROCEDURE — 3008F PR BODY MASS INDEX (BMI) DOCUMENTED: ICD-10-PCS | Mod: CPTII,S$GLB,, | Performed by: UROLOGY

## 2022-01-10 PROCEDURE — 99024 POSTOP FOLLOW-UP VISIT: CPT | Mod: S$GLB,,, | Performed by: UROLOGY

## 2022-01-10 NOTE — PROGRESS NOTES
Subjective:       Patient ID: Galindo Grant is a 36 y.o. male The patient's last visit with me was on 1/3/2022.   Chief Complaint:   Chief Complaint   Patient presents with    Post-op Evaluation       Incomplete Bladder Emptying  He has an Interstim in place for incomplete bladder emptying.  He is not sure which year, 2012 or 2013.  It was placed in [place].  He is not sure if it working.    10/26/2021  He would like to schedule replacement.  The [employer] rep has indicated the battery is non-functional.  He does not want to rechargeable type.    12/27/2021  He is s/p Interstim Replacement on 12/10/2021.  He noted some pain last night at the incision.  He feels that the device is working.    1/3/2022  He went to the ED on 12/30/2021.  He feels better.  He is concerned about the bacteria.     01/10/2022  He feels well.  He denies pain or swelling.  He is still using Neosporin.        ACTIVE MEDICAL ISSUES:  Patient Active Problem List   Diagnosis    Neurofibromatosis, type 1 (von Recklinghausen's disease)    Other diseases of respiratory system, not elsewhere classified    Other symptoms involving respiratory system and chest    Systemic inflammatory response syndrome due to non-infectious process without acute organ dysfunction    Low back pain    Dysphagia, oropharyngeal    Chest pain, atypical    CHEEK (dyspnea on exertion)    Incomplete emptying of bladder       ALLERGIES AND MEDICATIONS: updated and reviewed.  Review of patient's allergies indicates:  No Known Allergies  Current Outpatient Medications   Medication Sig    acetaminophen (TYLENOL) 500 MG tablet Take 1 tablet (500 mg total) by mouth every 6 (six) hours as needed for Pain.    atorvastatin (LIPITOR) 10 MG tablet Take 1 tablet (10 mg total) by mouth once daily.    bacitracin 500 unit/gram Oint Apply topically 2 (two) times daily.    baclofen (LIORESAL) 10 MG tablet Take 1 tablet (10 mg total) by mouth 3 (three) times daily.     calcium-vitamin D3 (CALCIUM 500 + D) 500 mg(1,250mg) -200 unit per tablet Take 1 tablet by mouth 2 (two) times daily with meals.    cyanocobalamin (VITAMIN B-12) 1000 MCG tablet Take 100 mcg by mouth 2 (two) times a day.    cyclobenzaprine (FLEXERIL) 5 MG tablet Take 1 tablet (5 mg total) by mouth nightly as needed for Muscle spasms.    diclofenac sodium (VOLTAREN) 1 % Gel Apply 2 g topically 4 (four) times daily.    FLUoxetine 20 MG capsule Take 1 capsule (20 mg total) by mouth once daily.    HYDROcodone-acetaminophen (NORCO) 7.5-325 mg per tablet Take 1 tablet by mouth every 6 (six) hours as needed for Pain.    hydrOXYzine HCl (ATARAX) 25 MG tablet Take 1 tablet (25 mg total) by mouth 3 (three) times daily. DO NOT DRIVE AFTER TAKING MED    ibuprofen (ADVIL,MOTRIN) 600 MG tablet Take 1 tablet (600 mg total) by mouth every 6 (six) hours as needed for Pain.    levETIRAcetam (KEPPRA) 500 MG Tab Take 2 tablets (1,000 mg total) by mouth 2 (two) times daily.    levothyroxine sodium (LEVOTHYROXINE ORAL) Take by mouth.    metoprolol tartrate (LOPRESSOR) 25 MG tablet Take 0.5 tablets (12.5 mg total) by mouth 2 (two) times daily.    mupirocin (BACTROBAN) 2 % ointment Apply topically 3 (three) times daily.    naproxen (NAPROSYN) 500 MG tablet Take 500 mg by mouth 2 (two) times daily.    nitroGLYCERIN (NITROSTAT) 0.4 MG SL tablet Place 1 tablet (0.4 mg total) under the tongue every 5 (five) minutes as needed for Chest pain (Repeat in 5 min if CP persists. Go to ER if CP worsens).    simvastatin (ZOCOR) 20 MG tablet Take 20 mg by mouth every evening.    sumatriptan (IMITREX) 50 MG tablet 1 tab PO x1 prn. May take another tablet after 2 hours if the headache recurs. Maximum of 4 tablets a day.    tramadol (ULTRAM) 50 mg tablet Take 1 tablet (50 mg total) by mouth every 12 (twelve) hours as needed for Pain.    vitamin D (VITAMIN D3) 1000 units Tab Take 1,000 Units by mouth 2 (two) times a day.     No current  "facility-administered medications for this visit.       Review of Systems   Constitutional: Negative for activity change, fatigue, fever and unexpected weight change.   HENT: Negative for congestion.    Eyes: Negative for redness.   Respiratory: Negative for chest tightness and shortness of breath.    Cardiovascular: Negative for chest pain and leg swelling.   Gastrointestinal: Negative for abdominal pain, constipation, diarrhea, nausea and vomiting.   Genitourinary: Negative for dysuria, flank pain, frequency, hematuria, penile pain, penile swelling, scrotal swelling, testicular pain and urgency.   Musculoskeletal: Negative for arthralgias and back pain.   Neurological: Negative for dizziness and light-headedness.   Psychiatric/Behavioral: Negative for behavioral problems and confusion. The patient is not nervous/anxious.    All other systems reviewed and are negative.      Objective:      Vitals:    01/10/22 1418   Weight: 75.4 kg (166 lb 3.6 oz)   Height: 5' 4" (1.626 m)     Physical Exam  Vitals and nursing note reviewed.   Constitutional:       Appearance: He is well-developed.   HENT:      Head: Normocephalic.   Eyes:      Conjunctiva/sclera: Conjunctivae normal.   Neck:      Thyroid: No thyromegaly.      Trachea: No tracheal deviation.   Cardiovascular:      Rate and Rhythm: Normal rate.      Heart sounds: Normal heart sounds.   Pulmonary:      Effort: Pulmonary effort is normal. No respiratory distress.      Breath sounds: Normal breath sounds. No wheezing.   Abdominal:      General: Bowel sounds are normal.      Palpations: Abdomen is soft.      Tenderness: There is no abdominal tenderness. There is no rebound.      Hernia: No hernia is present.   Musculoskeletal:         General: No tenderness. Normal range of motion.      Cervical back: Normal range of motion and neck supple.      Comments: Right superior buttock incision intact, no erythema or induration    Left midline incision no erythema, small amount " of fibrinous material centrally.no pain. clean   Lymphadenopathy:      Cervical: No cervical adenopathy.   Skin:     General: Skin is warm and dry.      Findings: No erythema or rash.   Neurological:      Mental Status: He is alert and oriented to person, place, and time.   Psychiatric:         Behavior: Behavior normal.         Thought Content: Thought content normal.         Judgment: Judgment normal.         Urine dipstick shows negative for all components.  Micro exam: negative for WBC's or RBC's.    Aerobic Bacterial Culture  Abnormal   STAPHYLOCOCCUS AUREUS   Many     Resulting Agency [facility]          Susceptibility     Staphylococcus aureus     CULTURE, AEROBIC  (SPECIFY SOURCE)     Clindamycin <=0.5 mcg/mL Resistant     Erythromycin >4 mcg/mL Resistant     Oxacillin <=0.25 mcg/mL Sensitive     Tetracycline <=4 mcg/mL Sensitive     Trimeth/Sulfa <=0.5/9.5 m... Sensitive     Vancomycin 2 mcg/mL Sensitive               Linear View         Specimen Collected: 12/30/21 13:49               Assessment:       1. Incomplete emptying of bladder          Plan:       1. Incomplete emptying of bladder  Doing well.  Continue Neosporin until it has completely healed.            No follow-ups on file.

## 2022-01-18 ENCOUNTER — LAB VISIT (OUTPATIENT)
Dept: LAB | Facility: HOSPITAL | Age: 37
End: 2022-01-18
Attending: INTERNAL MEDICINE
Payer: MEDICARE

## 2022-01-18 DIAGNOSIS — D58.2 ELEVATED HEMOGLOBIN: ICD-10-CM

## 2022-01-18 LAB
BASOPHILS # BLD AUTO: 0.07 K/UL (ref 0–0.2)
BASOPHILS NFR BLD: 1.2 % (ref 0–1.9)
DIFFERENTIAL METHOD: NORMAL
EOSINOPHIL # BLD AUTO: 0.2 K/UL (ref 0–0.5)
EOSINOPHIL NFR BLD: 3.9 % (ref 0–8)
ERYTHROCYTE [DISTWIDTH] IN BLOOD BY AUTOMATED COUNT: 11.9 % (ref 11.5–14.5)
HCT VFR BLD AUTO: 48.5 % (ref 40–54)
HGB BLD-MCNC: 16.1 G/DL (ref 14–18)
IMM GRANULOCYTES # BLD AUTO: 0.01 K/UL (ref 0–0.04)
IMM GRANULOCYTES NFR BLD AUTO: 0.2 % (ref 0–0.5)
LYMPHOCYTES # BLD AUTO: 2 K/UL (ref 1–4.8)
LYMPHOCYTES NFR BLD: 35.3 % (ref 18–48)
MCH RBC QN AUTO: 29.5 PG (ref 27–31)
MCHC RBC AUTO-ENTMCNC: 33.2 G/DL (ref 32–36)
MCV RBC AUTO: 89 FL (ref 82–98)
MONOCYTES # BLD AUTO: 0.8 K/UL (ref 0.3–1)
MONOCYTES NFR BLD: 13.7 % (ref 4–15)
NEUTROPHILS # BLD AUTO: 2.6 K/UL (ref 1.8–7.7)
NEUTROPHILS NFR BLD: 45.7 % (ref 38–73)
NRBC BLD-RTO: 0 /100 WBC
PLATELET # BLD AUTO: 275 K/UL (ref 150–450)
PMV BLD AUTO: 9.8 FL (ref 9.2–12.9)
RBC # BLD AUTO: 5.45 M/UL (ref 4.6–6.2)
WBC # BLD AUTO: 5.69 K/UL (ref 3.9–12.7)

## 2022-01-18 PROCEDURE — 36415 COLL VENOUS BLD VENIPUNCTURE: CPT | Performed by: INTERNAL MEDICINE

## 2022-01-18 PROCEDURE — 85025 COMPLETE CBC W/AUTO DIFF WBC: CPT | Performed by: INTERNAL MEDICINE

## 2022-01-18 PROCEDURE — 82668 ASSAY OF ERYTHROPOIETIN: CPT | Performed by: INTERNAL MEDICINE

## 2022-01-20 LAB — EPO SERPL-ACNC: 12.5 MIU/ML (ref 2.6–18.5)

## 2022-01-24 ENCOUNTER — TELEPHONE (OUTPATIENT)
Dept: HEMATOLOGY/ONCOLOGY | Facility: CLINIC | Age: 37
End: 2022-01-24
Payer: MEDICARE

## 2022-01-24 DIAGNOSIS — D58.2 ELEVATED HEMOGLOBIN: Primary | ICD-10-CM

## 2022-01-24 DIAGNOSIS — E83.10 DISORDER OF IRON METABOLISM, UNSPECIFIED: ICD-10-CM

## 2022-01-24 NOTE — TELEPHONE ENCOUNTER
Rec'd incoming call from patient regarding reschedule clinic and lab appointment. Patient agreed to new clinic and lab appointments. Appointments were mailed.

## 2022-02-07 ENCOUNTER — OFFICE VISIT (OUTPATIENT)
Dept: UROLOGY | Facility: CLINIC | Age: 37
End: 2022-02-07
Payer: MEDICARE

## 2022-02-07 VITALS — WEIGHT: 165.44 LBS | BODY MASS INDEX: 28.4 KG/M2

## 2022-02-07 DIAGNOSIS — R33.9 INCOMPLETE EMPTYING OF BLADDER: Primary | ICD-10-CM

## 2022-02-07 DIAGNOSIS — N40.0 BPH WITHOUT OBSTRUCTION/LOWER URINARY TRACT SYMPTOMS: ICD-10-CM

## 2022-02-07 PROCEDURE — 99999 PR PBB SHADOW E&M-EST. PATIENT-LVL III: ICD-10-PCS | Mod: PBBFAC,,, | Performed by: UROLOGY

## 2022-02-07 PROCEDURE — 99999 PR PBB SHADOW E&M-EST. PATIENT-LVL III: CPT | Mod: PBBFAC,,, | Performed by: UROLOGY

## 2022-02-07 PROCEDURE — 1160F PR REVIEW ALL MEDS BY PRESCRIBER/CLIN PHARMACIST DOCUMENTED: ICD-10-PCS | Mod: CPTII,S$GLB,, | Performed by: UROLOGY

## 2022-02-07 PROCEDURE — 1159F MED LIST DOCD IN RCRD: CPT | Mod: CPTII,S$GLB,, | Performed by: UROLOGY

## 2022-02-07 PROCEDURE — 3008F BODY MASS INDEX DOCD: CPT | Mod: CPTII,S$GLB,, | Performed by: UROLOGY

## 2022-02-07 PROCEDURE — 99024 POSTOP FOLLOW-UP VISIT: CPT | Mod: S$GLB,,, | Performed by: UROLOGY

## 2022-02-07 PROCEDURE — 1159F PR MEDICATION LIST DOCUMENTED IN MEDICAL RECORD: ICD-10-PCS | Mod: CPTII,S$GLB,, | Performed by: UROLOGY

## 2022-02-07 PROCEDURE — 99024 PR POST-OP FOLLOW-UP VISIT: ICD-10-PCS | Mod: S$GLB,,, | Performed by: UROLOGY

## 2022-02-07 PROCEDURE — 1160F RVW MEDS BY RX/DR IN RCRD: CPT | Mod: CPTII,S$GLB,, | Performed by: UROLOGY

## 2022-02-07 PROCEDURE — 3008F PR BODY MASS INDEX (BMI) DOCUMENTED: ICD-10-PCS | Mod: CPTII,S$GLB,, | Performed by: UROLOGY

## 2022-02-07 NOTE — PROGRESS NOTES
Subjective:       Patient ID: Galindo Grant is a 36 y.o. male The patient's last visit with me was on 1/10/2022.   Chief Complaint:   No chief complaint on file.      Incomplete Bladder Emptying  He has an Interstim in place for incomplete bladder emptying.  He is not sure which year, 2012 or 2013.  It was placed in [place].  He is not sure if it working.    10/26/2021  He would like to schedule replacement.  The [employer] rep has indicated the battery is non-functional.  He does not want to rechargeable type.    12/27/2021  He is s/p Interstim Replacement on 12/10/2021.  He noted some pain last night at the incision.  He feels that the device is working.    1/3/2022  He went to the ED on 12/30/2021.  He feels better.  He is concerned about the bacteria.     1/10/2022  He feels well.  He denies pain or swelling.  He is still using Neosporin.    02/07/2022  He is doing well.  His wound has completely healed.  He denies pain.  He device is working        ACTIVE MEDICAL ISSUES:  Patient Active Problem List   Diagnosis    Neurofibromatosis, type 1 (von Recklinghausen's disease)    Other diseases of respiratory system, not elsewhere classified    Other symptoms involving respiratory system and chest    Systemic inflammatory response syndrome due to non-infectious process without acute organ dysfunction    Low back pain    Dysphagia, oropharyngeal    Chest pain, atypical    CHEEK (dyspnea on exertion)    Incomplete emptying of bladder       ALLERGIES AND MEDICATIONS: updated and reviewed.  Review of patient's allergies indicates:  No Known Allergies  Current Outpatient Medications   Medication Sig    acetaminophen (TYLENOL) 500 MG tablet Take 1 tablet (500 mg total) by mouth every 6 (six) hours as needed for Pain.    atorvastatin (LIPITOR) 10 MG tablet Take 1 tablet (10 mg total) by mouth once daily.    bacitracin 500 unit/gram Oint Apply topically 2 (two) times daily.    baclofen (LIORESAL) 10 MG tablet Take  1 tablet (10 mg total) by mouth 3 (three) times daily.    calcium-vitamin D3 (CALCIUM 500 + D) 500 mg(1,250mg) -200 unit per tablet Take 1 tablet by mouth 2 (two) times daily with meals.    cyanocobalamin (VITAMIN B-12) 1000 MCG tablet Take 100 mcg by mouth 2 (two) times a day.    cyclobenzaprine (FLEXERIL) 5 MG tablet Take 1 tablet (5 mg total) by mouth nightly as needed for Muscle spasms.    FLUoxetine 20 MG capsule Take 1 capsule (20 mg total) by mouth once daily.    HYDROcodone-acetaminophen (NORCO) 7.5-325 mg per tablet Take 1 tablet by mouth every 6 (six) hours as needed for Pain.    hydrOXYzine HCl (ATARAX) 25 MG tablet Take 1 tablet (25 mg total) by mouth 3 (three) times daily. DO NOT DRIVE AFTER TAKING MED    ibuprofen (ADVIL,MOTRIN) 600 MG tablet Take 1 tablet (600 mg total) by mouth every 6 (six) hours as needed for Pain.    levETIRAcetam (KEPPRA) 500 MG Tab Take 2 tablets (1,000 mg total) by mouth 2 (two) times daily.    levothyroxine sodium (LEVOTHYROXINE ORAL) Take by mouth.    metoprolol tartrate (LOPRESSOR) 25 MG tablet Take 0.5 tablets (12.5 mg total) by mouth 2 (two) times daily.    naproxen (NAPROSYN) 500 MG tablet Take 500 mg by mouth 2 (two) times daily.    nitroGLYCERIN (NITROSTAT) 0.4 MG SL tablet Place 1 tablet (0.4 mg total) under the tongue every 5 (five) minutes as needed for Chest pain (Repeat in 5 min if CP persists. Go to ER if CP worsens).    sumatriptan (IMITREX) 50 MG tablet 1 tab PO x1 prn. May take another tablet after 2 hours if the headache recurs. Maximum of 4 tablets a day.    tramadol (ULTRAM) 50 mg tablet Take 1 tablet (50 mg total) by mouth every 12 (twelve) hours as needed for Pain.    vitamin D (VITAMIN D3) 1000 units Tab Take 1,000 Units by mouth 2 (two) times a day.    diclofenac sodium (VOLTAREN) 1 % Gel Apply 2 g topically 4 (four) times daily.    mupirocin (BACTROBAN) 2 % ointment Apply topically 3 (three) times daily. (Patient not taking: Reported on  2/7/2022)    simvastatin (ZOCOR) 20 MG tablet Take 20 mg by mouth every evening.     No current facility-administered medications for this visit.       Review of Systems   Constitutional: Negative for activity change, fatigue, fever and unexpected weight change.   HENT: Negative for congestion.    Eyes: Negative for redness.   Respiratory: Negative for chest tightness and shortness of breath.    Cardiovascular: Negative for chest pain and leg swelling.   Gastrointestinal: Negative for abdominal pain, constipation, diarrhea, nausea and vomiting.   Genitourinary: Negative for dysuria, flank pain, frequency, hematuria, penile pain, penile swelling, scrotal swelling, testicular pain and urgency.   Musculoskeletal: Negative for arthralgias and back pain.   Neurological: Negative for dizziness and light-headedness.   Psychiatric/Behavioral: Negative for behavioral problems and confusion. The patient is not nervous/anxious.    All other systems reviewed and are negative.      Objective:      Vitals:    02/07/22 1412   Weight: 75 kg (165 lb 7.3 oz)     Physical Exam  Vitals and nursing note reviewed.   Constitutional:       Appearance: He is well-developed.   HENT:      Head: Normocephalic.   Eyes:      Conjunctiva/sclera: Conjunctivae normal.   Neck:      Thyroid: No thyromegaly.      Trachea: No tracheal deviation.   Cardiovascular:      Rate and Rhythm: Normal rate.      Heart sounds: Normal heart sounds.   Pulmonary:      Effort: Pulmonary effort is normal. No respiratory distress.      Breath sounds: Normal breath sounds. No wheezing.   Abdominal:      General: Bowel sounds are normal.      Palpations: Abdomen is soft.      Tenderness: There is no abdominal tenderness. There is no rebound.      Hernia: No hernia is present.   Musculoskeletal:         General: No tenderness. Normal range of motion.      Cervical back: Normal range of motion and neck supple.      Comments: Right superior buttock incision intact, no  erythema or induration    Left midline incision no erythema, closed, healed   Lymphadenopathy:      Cervical: No cervical adenopathy.   Skin:     General: Skin is warm and dry.      Findings: No erythema or rash.   Neurological:      Mental Status: He is alert and oriented to person, place, and time.   Psychiatric:         Behavior: Behavior normal.         Thought Content: Thought content normal.         Judgment: Judgment normal.         Urine dipstick shows negative for all components.  Micro exam: negative for WBC's or RBC's.        Assessment:       1. Incomplete emptying of bladder    2. BPH without obstruction/lower urinary tract symptoms          Plan:       1. Incomplete emptying of bladder  Doing well    2. BPH without obstruction/lower urinary tract symptoms  stable           Follow up in about 6 months (around 8/7/2022).

## 2022-02-23 ENCOUNTER — HOSPITAL ENCOUNTER (EMERGENCY)
Facility: HOSPITAL | Age: 37
Discharge: HOME | End: 2022-02-23
Attending: EMERGENCY MEDICINE
Payer: MEDICARE

## 2022-02-23 VITALS
WEIGHT: 166 LBS | DIASTOLIC BLOOD PRESSURE: 62 MMHG | RESPIRATION RATE: 20 BRPM | HEIGHT: 64 IN | BODY MASS INDEX: 28.34 KG/M2 | SYSTOLIC BLOOD PRESSURE: 109 MMHG | HEART RATE: 102 BPM | OXYGEN SATURATION: 96 % | TEMPERATURE: 100 F

## 2022-02-23 DIAGNOSIS — B34.9 VIRAL SYNDROME: Primary | ICD-10-CM

## 2022-02-23 DIAGNOSIS — R06.02 SOB (SHORTNESS OF BREATH): ICD-10-CM

## 2022-02-23 LAB
CTP QC/QA: YES
CTP QC/QA: YES
POC MOLECULAR INFLUENZA A AGN: NEGATIVE
POC MOLECULAR INFLUENZA B AGN: NEGATIVE
SARS-COV-2 RDRP RESP QL NAA+PROBE: NEGATIVE

## 2022-02-23 PROCEDURE — 87502 INFLUENZA DNA AMP PROBE: CPT

## 2022-02-23 PROCEDURE — U0005 INFEC AGEN DETEC AMPLI PROBE: HCPCS | Performed by: EMERGENCY MEDICINE

## 2022-02-23 PROCEDURE — 99284 EMERGENCY DEPT VISIT MOD MDM: CPT | Mod: 25

## 2022-02-23 PROCEDURE — U0002 COVID-19 LAB TEST NON-CDC: HCPCS | Performed by: PHYSICIAN ASSISTANT

## 2022-02-23 PROCEDURE — 25000003 PHARM REV CODE 250: Performed by: PHYSICIAN ASSISTANT

## 2022-02-23 PROCEDURE — U0003 INFECTIOUS AGENT DETECTION BY NUCLEIC ACID (DNA OR RNA); SEVERE ACUTE RESPIRATORY SYNDROME CORONAVIRUS 2 (SARS-COV-2) (CORONAVIRUS DISEASE [COVID-19]), AMPLIFIED PROBE TECHNIQUE, MAKING USE OF HIGH THROUGHPUT TECHNOLOGIES AS DESCRIBED BY CMS-2020-01-R: HCPCS | Performed by: EMERGENCY MEDICINE

## 2022-02-23 RX ORDER — IBUPROFEN 400 MG/1
800 TABLET ORAL
Status: COMPLETED | OUTPATIENT
Start: 2022-02-23 | End: 2022-02-23

## 2022-02-23 RX ORDER — BENZONATATE 100 MG/1
100 CAPSULE ORAL 3 TIMES DAILY PRN
Qty: 20 CAPSULE | Refills: 0 | Status: SHIPPED | OUTPATIENT
Start: 2022-02-23 | End: 2022-03-05

## 2022-02-23 RX ORDER — IBUPROFEN 800 MG/1
800 TABLET ORAL EVERY 6 HOURS PRN
Qty: 20 TABLET | Refills: 0 | Status: SHIPPED | OUTPATIENT
Start: 2022-02-23

## 2022-02-23 RX ADMIN — IBUPROFEN 800 MG: 400 TABLET, FILM COATED ORAL at 03:02

## 2022-02-23 NOTE — ED PROVIDER NOTES
Encounter Date: 2/23/2022    SCRIBE #1 NOTE: I, David Owens, am scribing for, and in the presence of,  MONTSE Burton. I have scribed the following portions of the note - Other sections scribed: HPI, ROS, PE.       History     Chief Complaint   Patient presents with    Fever    Shortness of Breath     Pt states he has been SOB since this morning with Fever, Body Aches and Chills     36 y.o. male, with no pertinent past medical history presents to the ED with constant flu-like symptoms that began this morning. Pt reports experiencing associated body aches, weakness, diarrhea, constant shortness of breath, bilateral leg pain, and generalized back pain. Pt states that he has a history of back pain with his scoliosis but it feels worse now. Pt describes the current pain as a 10/10. Pt denies smoking cigarettes. Pt denies being vaccinated for Covid-19. Pt denies taking any medication prior to arrival. No other exacerbating or alleviating factors. Patient denies vomiting, cough, chest pain, or other associated symptoms.       The history is provided by the patient. No  was used.     Review of patient's allergies indicates:  No Known Allergies  Past Medical History:   Diagnosis Date    Arthritis     in knee    Dysphagia, oropharyngeal 4/1/2016    Headache(784.0)     Irregular heart beat     Mass of larynx 10/2/2012    Neurofibromatosis syndrome     Neurofibromatosis, type 1 (von Recklinghausen's disease) birth    Neurofibromatosis, type 1 (von Recklinghausen's disease) 7/9/2012     Past Surgical History:   Procedure Laterality Date    ARM DEBRIDEMENT      NF - right    partial supraglottic laryngectomy  6/15/2012    REPLACEMENT OF SACRAL NERVE STIMULATOR N/A 12/10/2021    Procedure: REPLACEMENT, NEUROSTIMULATOR, SACRAL;  Surgeon: KIP Shipley MD;  Location: [facility] OR;  Service: Urology;  Laterality: N/A;  [facility] [phone] WILL BE HERE FOR THE CASE PER HANANE  ON 12-1-2021 WILL BE HERE  RN Pre Op 11-23-21.  ---COVID NEGATIVE ON 12/8----- C A  IMPLANTS ORDERED ON 12-1-2021     Family History   Problem Relation Age of Onset    Cancer Father         neuro fibroma mitosis    Cancer Paternal Uncle         neuro fibroma mitosis    Cancer Cousin         neuro fibroma mitosis     Social History     Tobacco Use    Smoking status: Former Smoker    Smokeless tobacco: Never Used   Substance Use Topics    Alcohol use: No    Drug use: No     Review of Systems   Constitutional: Negative for chills and fever.   HENT: Negative for congestion, rhinorrhea and sore throat.    Eyes: Negative for visual disturbance.   Respiratory: Positive for shortness of breath. Negative for cough.    Cardiovascular: Negative for chest pain.   Gastrointestinal: Positive for diarrhea. Negative for abdominal pain, nausea and vomiting.   Genitourinary: Negative for dysuria, frequency and hematuria.   Musculoskeletal: Positive for back pain and myalgias.        (+) leg pain   Skin: Negative for rash.   Neurological: Positive for weakness. Negative for dizziness and headaches.       Physical Exam     Initial Vitals [02/23/22 1413]   BP Pulse Resp Temp SpO2   (!) 140/65 (!) 130 20 99.9 °F (37.7 °C) 97 %      MAP       --         Physical Exam    Nursing note and vitals reviewed.  Constitutional: He appears well-developed and well-nourished.   HENT:   Head: Normocephalic and atraumatic.   Eyes: EOM are normal. Pupils are equal, round, and reactive to light.   Neck: Neck supple. No thyromegaly present. No JVD present.   Normal range of motion.  Cardiovascular: Normal rate and regular rhythm. Exam reveals no gallop and no friction rub.    No murmur heard.  Pulmonary/Chest: Breath sounds normal. No respiratory distress.   Musculoskeletal:         General: No tenderness or edema. Normal range of motion.      Cervical back: Normal range of motion and neck supple.     Neurological: He is alert and oriented to  person, place, and time. He has normal strength. GCS score is 15. GCS eye subscore is 4. GCS verbal subscore is 5. GCS motor subscore is 6.   Skin: Skin is warm. Capillary refill takes less than 2 seconds.   Psychiatric: He has a normal mood and affect. His behavior is normal. Thought content normal.         ED Course   Procedures  Labs Reviewed   SARS-COV-2 (COVID-19) QUALITATIVE PCR   POCT INFLUENZA A/B MOLECULAR   SARS-COV-2 RDRP GENE          Imaging Results          X-Ray Chest AP Portable (Final result)  Result time 02/23/22 16:03:40    Final result by Mary Richmond MD (02/23/22 16:03:40)                 Impression:      No acute cardiopulmonary disease.      Electronically signed by: Mary Richmond  Date:    02/23/2022  Time:    16:03             Narrative:    EXAMINATION:  XR CHEST AP PORTABLE    CLINICAL HISTORY:  Shortness of breath    TECHNIQUE:  Single frontal view of the chest was performed.    COMPARISON:  11/06/2017    FINDINGS:  The lungs are clear.  No pleural effusion.  The cardiomediastinal silhouette is within normal limits.  The bones and soft tissues are unremarkable.                                 Medications   ibuprofen tablet 800 mg (800 mg Oral Given 2/23/22 1537)     Medical Decision Making:   History:   Old Medical Records: I decided to obtain old medical records.  Initial Assessment:   Patient has symptoms consistent with a viral URI. Lungs clear to auscultation on exam, hence I doubt pneumonia. Patient appears well hydrated and nontoxic clinically.  Chest x-ray normal.  Influenza a and COVID screen are negative.  Vitals stable.  No nuchal rigidity, nausea, vomiting, photophobia, or headache, therefore I doubt meningitis at this time. Will d/c home with viral uri instructions.           Scribe Attestation:   Scribe #1: I performed the above scribed service and the documentation accurately describes the services I performed. I attest to the accuracy of the note.                  Clinical Impression:   Final diagnoses:  [R06.02] SOB (shortness of breath)  [B34.9] Viral syndrome (Primary)          ED Disposition Condition    Discharge Stable        ED Prescriptions     Medication Sig Dispense Start Date End Date Auth. Provider    benzonatate (TESSALON) 100 MG capsule Take 1 capsule (100 mg total) by mouth 3 (three) times daily as needed. 20 capsule 2/23/2022 3/5/2022 MONTSE Burton    ibuprofen (ADVIL,MOTRIN) 800 MG tablet Take 1 tablet (800 mg total) by mouth every 6 (six) hours as needed for Pain. 20 tablet 2/23/2022  MONTSE Burton        Follow-up Information     Follow up With Specialties Details Why Contact Info    Liyah Spring, DO Family Medicine   [address] [facility] [address]  [phone]         I, Margoth Durán PA-C , personally performed the services described in this documentation. All medical record entries made by the scribe were at my direction and in my presence. I have reviewed the chart and agree that the record reflects my personal performance and is accurate and complete.       MONTSE Burton  02/23/22 2001

## 2022-02-25 LAB
SARS-COV-2 RNA RESP QL NAA+PROBE: NOT DETECTED
SARS-COV-2- CYCLE NUMBER: NORMAL

## 2022-03-10 ENCOUNTER — LAB VISIT (OUTPATIENT)
Dept: LAB | Facility: HOSPITAL | Age: 37
End: 2022-03-10
Attending: INTERNAL MEDICINE
Payer: MEDICARE

## 2022-03-10 DIAGNOSIS — E83.10 DISORDER OF IRON METABOLISM, UNSPECIFIED: ICD-10-CM

## 2022-03-10 DIAGNOSIS — D58.2 ELEVATED HEMOGLOBIN: ICD-10-CM

## 2022-03-10 LAB
BASOPHILS # BLD AUTO: 0.05 K/UL (ref 0–0.2)
BASOPHILS NFR BLD: 0.8 % (ref 0–1.9)
DIFFERENTIAL METHOD: NORMAL
EOSINOPHIL # BLD AUTO: 0.3 K/UL (ref 0–0.5)
EOSINOPHIL NFR BLD: 4.5 % (ref 0–8)
ERYTHROCYTE [DISTWIDTH] IN BLOOD BY AUTOMATED COUNT: 12.6 % (ref 11.5–14.5)
HCT VFR BLD AUTO: 47.7 % (ref 40–54)
HGB BLD-MCNC: 15.7 G/DL (ref 14–18)
IMM GRANULOCYTES # BLD AUTO: 0.01 K/UL (ref 0–0.04)
IMM GRANULOCYTES NFR BLD AUTO: 0.2 % (ref 0–0.5)
LYMPHOCYTES # BLD AUTO: 1.7 K/UL (ref 1–4.8)
LYMPHOCYTES NFR BLD: 28.1 % (ref 18–48)
MCH RBC QN AUTO: 29.1 PG (ref 27–31)
MCHC RBC AUTO-ENTMCNC: 32.9 G/DL (ref 32–36)
MCV RBC AUTO: 88 FL (ref 82–98)
MONOCYTES # BLD AUTO: 0.4 K/UL (ref 0.3–1)
MONOCYTES NFR BLD: 7.1 % (ref 4–15)
NEUTROPHILS # BLD AUTO: 3.7 K/UL (ref 1.8–7.7)
NEUTROPHILS NFR BLD: 59.3 % (ref 38–73)
NRBC BLD-RTO: 0 /100 WBC
PLATELET # BLD AUTO: 338 K/UL (ref 150–450)
PMV BLD AUTO: 9.8 FL (ref 9.2–12.9)
RBC # BLD AUTO: 5.4 M/UL (ref 4.6–6.2)
WBC # BLD AUTO: 6.2 K/UL (ref 3.9–12.7)

## 2022-03-10 PROCEDURE — 85025 COMPLETE CBC W/AUTO DIFF WBC: CPT | Performed by: INTERNAL MEDICINE

## 2022-03-10 PROCEDURE — 36415 COLL VENOUS BLD VENIPUNCTURE: CPT | Performed by: INTERNAL MEDICINE

## 2022-03-10 PROCEDURE — 82668 ASSAY OF ERYTHROPOIETIN: CPT | Performed by: INTERNAL MEDICINE

## 2022-03-14 LAB — EPO SERPL-ACNC: 9.5 MIU/ML (ref 2.6–18.5)

## 2022-03-15 ENCOUNTER — OFFICE VISIT (OUTPATIENT)
Dept: HEMATOLOGY/ONCOLOGY | Facility: CLINIC | Age: 37
End: 2022-03-15
Payer: MEDICARE

## 2022-03-15 ENCOUNTER — TELEPHONE (OUTPATIENT)
Dept: HEMATOLOGY/ONCOLOGY | Facility: CLINIC | Age: 37
End: 2022-03-15
Payer: MEDICARE

## 2022-03-15 VITALS
BODY MASS INDEX: 27.18 KG/M2 | WEIGHT: 159.19 LBS | DIASTOLIC BLOOD PRESSURE: 58 MMHG | OXYGEN SATURATION: 98 % | HEIGHT: 64 IN | TEMPERATURE: 98 F | SYSTOLIC BLOOD PRESSURE: 116 MMHG | HEART RATE: 76 BPM

## 2022-03-15 DIAGNOSIS — R56.9 SEIZURE: ICD-10-CM

## 2022-03-15 DIAGNOSIS — Q85.01 NEUROFIBROMATOSIS, TYPE 1 (VON RECKLINGHAUSEN'S DISEASE): ICD-10-CM

## 2022-03-15 DIAGNOSIS — G47.9 SLEEP DISTURBANCES: ICD-10-CM

## 2022-03-15 DIAGNOSIS — E83.10 DISORDER OF IRON METABOLISM, UNSPECIFIED: ICD-10-CM

## 2022-03-15 DIAGNOSIS — R91.8 PULMONARY NODULES: ICD-10-CM

## 2022-03-15 DIAGNOSIS — D58.2 ELEVATED HEMOGLOBIN: Primary | ICD-10-CM

## 2022-03-15 PROCEDURE — 99214 PR OFFICE/OUTPT VISIT, EST, LEVL IV, 30-39 MIN: ICD-10-PCS | Mod: S$GLB,,, | Performed by: INTERNAL MEDICINE

## 2022-03-15 PROCEDURE — 3008F BODY MASS INDEX DOCD: CPT | Mod: CPTII,S$GLB,, | Performed by: INTERNAL MEDICINE

## 2022-03-15 PROCEDURE — 1159F PR MEDICATION LIST DOCUMENTED IN MEDICAL RECORD: ICD-10-PCS | Mod: CPTII,S$GLB,, | Performed by: INTERNAL MEDICINE

## 2022-03-15 PROCEDURE — 3008F PR BODY MASS INDEX (BMI) DOCUMENTED: ICD-10-PCS | Mod: CPTII,S$GLB,, | Performed by: INTERNAL MEDICINE

## 2022-03-15 PROCEDURE — 3078F DIAST BP <80 MM HG: CPT | Mod: CPTII,S$GLB,, | Performed by: INTERNAL MEDICINE

## 2022-03-15 PROCEDURE — 3078F PR MOST RECENT DIASTOLIC BLOOD PRESSURE < 80 MM HG: ICD-10-PCS | Mod: CPTII,S$GLB,, | Performed by: INTERNAL MEDICINE

## 2022-03-15 PROCEDURE — 99214 OFFICE O/P EST MOD 30 MIN: CPT | Mod: S$GLB,,, | Performed by: INTERNAL MEDICINE

## 2022-03-15 PROCEDURE — 3074F SYST BP LT 130 MM HG: CPT | Mod: CPTII,S$GLB,, | Performed by: INTERNAL MEDICINE

## 2022-03-15 PROCEDURE — 99999 PR PBB SHADOW E&M-EST. PATIENT-LVL V: ICD-10-PCS | Mod: PBBFAC,,, | Performed by: INTERNAL MEDICINE

## 2022-03-15 PROCEDURE — 99999 PR PBB SHADOW E&M-EST. PATIENT-LVL V: CPT | Mod: PBBFAC,,, | Performed by: INTERNAL MEDICINE

## 2022-03-15 PROCEDURE — 3074F PR MOST RECENT SYSTOLIC BLOOD PRESSURE < 130 MM HG: ICD-10-PCS | Mod: CPTII,S$GLB,, | Performed by: INTERNAL MEDICINE

## 2022-03-15 PROCEDURE — 1159F MED LIST DOCD IN RCRD: CPT | Mod: CPTII,S$GLB,, | Performed by: INTERNAL MEDICINE

## 2022-03-15 NOTE — TELEPHONE ENCOUNTER
Notified patient of scheduled Pulmonary appointment for 6/8/2022 @ 9:30 am with  on the [facility]. Patient agreed to appointment and instructed to review appointment details via patient portal. Patient verbalized understanding.

## 2022-03-20 ENCOUNTER — PATIENT MESSAGE (OUTPATIENT)
Dept: UROLOGY | Facility: CLINIC | Age: 37
End: 2022-03-20
Payer: MEDICARE

## 2022-03-21 ENCOUNTER — NURSE TRIAGE (OUTPATIENT)
Dept: ADMINISTRATIVE | Facility: CLINIC | Age: 37
End: 2022-03-21
Payer: MEDICARE

## 2022-03-21 ENCOUNTER — PATIENT MESSAGE (OUTPATIENT)
Dept: UROLOGY | Facility: CLINIC | Age: 37
End: 2022-03-21

## 2022-03-21 ENCOUNTER — OFFICE VISIT (OUTPATIENT)
Dept: UROLOGY | Facility: CLINIC | Age: 37
End: 2022-03-21
Payer: MEDICARE

## 2022-03-21 VITALS — BODY MASS INDEX: 26.98 KG/M2 | HEIGHT: 64 IN | WEIGHT: 158 LBS

## 2022-03-21 DIAGNOSIS — R33.9 INCOMPLETE EMPTYING OF BLADDER: Primary | ICD-10-CM

## 2022-03-21 DIAGNOSIS — N40.0 BPH WITHOUT OBSTRUCTION/LOWER URINARY TRACT SYMPTOMS: ICD-10-CM

## 2022-03-21 PROCEDURE — 1160F RVW MEDS BY RX/DR IN RCRD: CPT | Mod: CPTII,S$GLB,, | Performed by: UROLOGY

## 2022-03-21 PROCEDURE — 1159F MED LIST DOCD IN RCRD: CPT | Mod: CPTII,S$GLB,, | Performed by: UROLOGY

## 2022-03-21 PROCEDURE — 3008F PR BODY MASS INDEX (BMI) DOCUMENTED: ICD-10-PCS | Mod: CPTII,S$GLB,, | Performed by: UROLOGY

## 2022-03-21 PROCEDURE — 99999 PR PBB SHADOW E&M-EST. PATIENT-LVL IV: CPT | Mod: PBBFAC,,, | Performed by: UROLOGY

## 2022-03-21 PROCEDURE — 99999 PR PBB SHADOW E&M-EST. PATIENT-LVL IV: ICD-10-PCS | Mod: PBBFAC,,, | Performed by: UROLOGY

## 2022-03-21 PROCEDURE — 99214 OFFICE O/P EST MOD 30 MIN: CPT | Mod: S$GLB,,, | Performed by: UROLOGY

## 2022-03-21 PROCEDURE — 1160F PR REVIEW ALL MEDS BY PRESCRIBER/CLIN PHARMACIST DOCUMENTED: ICD-10-PCS | Mod: CPTII,S$GLB,, | Performed by: UROLOGY

## 2022-03-21 PROCEDURE — 1159F PR MEDICATION LIST DOCUMENTED IN MEDICAL RECORD: ICD-10-PCS | Mod: CPTII,S$GLB,, | Performed by: UROLOGY

## 2022-03-21 PROCEDURE — 3008F BODY MASS INDEX DOCD: CPT | Mod: CPTII,S$GLB,, | Performed by: UROLOGY

## 2022-03-21 PROCEDURE — 99214 PR OFFICE/OUTPT VISIT, EST, LEVL IV, 30-39 MIN: ICD-10-PCS | Mod: S$GLB,,, | Performed by: UROLOGY

## 2022-03-21 RX ORDER — SULFAMETHOXAZOLE AND TRIMETHOPRIM 800; 160 MG/1; MG/1
1 TABLET ORAL 2 TIMES DAILY
Qty: 28 TABLET | Refills: 0 | Status: ON HOLD | OUTPATIENT
Start: 2022-03-21 | End: 2022-03-30 | Stop reason: SDUPTHER

## 2022-03-21 NOTE — PROGRESS NOTES
Subjective:       Patient ID: Galindo Grant is a 36 y.o. male The patient's last visit with me was on 2/7/2022.   Chief Complaint:   Chief Complaint   Patient presents with    Follow-up       Incomplete Bladder Emptying  He has an Interstim in place for incomplete bladder emptying.  He is not sure which year, 2012 or 2013.  It was placed in [place].  He is not sure if it working.    10/26/2021  He would like to schedule replacement.  The [employer] rep has indicated the battery is non-functional.  He does not want to rechargeable type.    12/27/2021  He is s/p Interstim Replacement on 12/10/2021.  He noted some pain last night at the incision.  He feels that the device is working.    1/3/2022  He went to the ED on 12/30/2021.  He feels better.  He is concerned about the bacteria.     1/10/2022  He feels well.  He denies pain or swelling.  He is still using Neosporin.    2/7/2022  He is doing well.  His wound has completely healed.  He denies pain.  He device is working.    03/21/2022  He has had issues for a couple of days.  He has pain at the generator site.  He tells me it was swollen 2 days ago.  He has intermittent bleeding from the lead site.  He denies fever.        ACTIVE MEDICAL ISSUES:  Patient Active Problem List   Diagnosis    Neurofibromatosis, type 1 (von Recklinghausen's disease)    Other diseases of respiratory system, not elsewhere classified    Other symptoms involving respiratory system and chest    Systemic inflammatory response syndrome due to non-infectious process without acute organ dysfunction    Low back pain    Dysphagia, oropharyngeal    Chest pain, atypical    CHEEK (dyspnea on exertion)    Incomplete emptying of bladder       ALLERGIES AND MEDICATIONS: updated and reviewed.  Review of patient's allergies indicates:  No Known Allergies  Current Outpatient Medications   Medication Sig    acetaminophen (TYLENOL) 500 MG tablet Take 1 tablet (500 mg total) by mouth every 6 (six)  hours as needed for Pain.    atorvastatin (LIPITOR) 10 MG tablet Take 1 tablet (10 mg total) by mouth once daily.    bacitracin 500 unit/gram Oint Apply topically 2 (two) times daily.    baclofen (LIORESAL) 10 MG tablet Take 1 tablet (10 mg total) by mouth 3 (three) times daily.    calcium-vitamin D3 (CALCIUM 500 + D) 500 mg(1,250mg) -200 unit per tablet Take 1 tablet by mouth 2 (two) times daily with meals.    cyanocobalamin (VITAMIN B-12) 1000 MCG tablet Take 100 mcg by mouth 2 (two) times a day.    cyclobenzaprine (FLEXERIL) 5 MG tablet Take 1 tablet (5 mg total) by mouth nightly as needed for Muscle spasms.    diclofenac sodium (VOLTAREN) 1 % Gel Apply 2 g topically 4 (four) times daily.    FLUoxetine 20 MG capsule Take 1 capsule (20 mg total) by mouth once daily.    HYDROcodone-acetaminophen (NORCO) 7.5-325 mg per tablet Take 1 tablet by mouth every 6 (six) hours as needed for Pain.    hydrOXYzine HCl (ATARAX) 25 MG tablet Take 1 tablet (25 mg total) by mouth 3 (three) times daily. DO NOT DRIVE AFTER TAKING MED    ibuprofen (ADVIL,MOTRIN) 800 MG tablet Take 1 tablet (800 mg total) by mouth every 6 (six) hours as needed for Pain.    levETIRAcetam (KEPPRA) 500 MG Tab Take 2 tablets (1,000 mg total) by mouth 2 (two) times daily.    levothyroxine sodium (LEVOTHYROXINE ORAL) Take by mouth.    metoprolol tartrate (LOPRESSOR) 25 MG tablet Take 0.5 tablets (12.5 mg total) by mouth 2 (two) times daily.    mupirocin (BACTROBAN) 2 % ointment Apply topically 3 (three) times daily. (Patient not taking: No sig reported)    naproxen (NAPROSYN) 500 MG tablet Take 500 mg by mouth 2 (two) times daily.    nitroGLYCERIN (NITROSTAT) 0.4 MG SL tablet Place 1 tablet (0.4 mg total) under the tongue every 5 (five) minutes as needed for Chest pain (Repeat in 5 min if CP persists. Go to ER if CP worsens).    simvastatin (ZOCOR) 20 MG tablet Take 20 mg by mouth every evening.    sulfamethoxazole-trimethoprim 800-160mg  "(BACTRIM DS) 800-160 mg Tab Take 1 tablet by mouth 2 (two) times daily. for 14 days    sumatriptan (IMITREX) 50 MG tablet 1 tab PO x1 prn. May take another tablet after 2 hours if the headache recurs. Maximum of 4 tablets a day.    tramadol (ULTRAM) 50 mg tablet Take 1 tablet (50 mg total) by mouth every 12 (twelve) hours as needed for Pain.    vitamin D (VITAMIN D3) 1000 units Tab Take 1,000 Units by mouth 2 (two) times a day.     No current facility-administered medications for this visit.       Review of Systems   Constitutional: Negative for activity change, fatigue, fever and unexpected weight change.   HENT: Negative for congestion.    Eyes: Negative for redness.   Respiratory: Negative for chest tightness and shortness of breath.    Cardiovascular: Negative for chest pain and leg swelling.   Gastrointestinal: Negative for abdominal pain, constipation, diarrhea, nausea and vomiting.   Genitourinary: Negative for dysuria, flank pain, frequency, hematuria, penile pain, penile swelling, scrotal swelling, testicular pain and urgency.   Musculoskeletal: Negative for arthralgias and back pain.   Neurological: Negative for dizziness and light-headedness.   Psychiatric/Behavioral: Negative for behavioral problems and confusion. The patient is not nervous/anxious.    All other systems reviewed and are negative.      Objective:      Vitals:    03/21/22 1426   Weight: 71.7 kg (158 lb)   Height: 5' 4" (1.626 m)     Physical Exam  Vitals and nursing note reviewed.   Constitutional:       Appearance: He is well-developed.   HENT:      Head: Normocephalic.   Eyes:      Conjunctiva/sclera: Conjunctivae normal.   Neck:      Thyroid: No thyromegaly.      Trachea: No tracheal deviation.   Cardiovascular:      Rate and Rhythm: Normal rate.      Heart sounds: Normal heart sounds.   Pulmonary:      Effort: Pulmonary effort is normal. No respiratory distress.      Breath sounds: Normal breath sounds. No wheezing.   Abdominal:     "  General: Bowel sounds are normal.      Palpations: Abdomen is soft.      Tenderness: There is no abdominal tenderness. There is no rebound.      Hernia: No hernia is present.   Musculoskeletal:         General: No tenderness. Normal range of motion.      Cervical back: Normal range of motion and neck supple.      Comments: Right superior buttock incision intact, no erythema but is tender laterally    Left midline incision no erythema, closed, no active bleeding or oozing today   Lymphadenopathy:      Cervical: No cervical adenopathy.   Skin:     General: Skin is warm and dry.      Findings: No erythema or rash.   Neurological:      Mental Status: He is alert and oriented to person, place, and time.   Psychiatric:         Behavior: Behavior normal.         Thought Content: Thought content normal.         Judgment: Judgment normal.         Urine dipstick shows negative for all components.  Micro exam: negative for WBC's or RBC's.        Assessment:       1. Incomplete emptying of bladder    2. BPH without obstruction/lower urinary tract symptoms          Plan:       1. Incomplete emptying of bladder  He will return later this week for a wound check.  He may need to have the device removed.  - sulfamethoxazole-trimethoprim 800-160mg (BACTRIM DS) 800-160 mg Tab; Take 1 tablet by mouth 2 (two) times daily. for 14 days  Dispense: 28 tablet; Refill: 0    2. BPH without obstruction/lower urinary tract symptoms  stable            No follow-ups on file.

## 2022-03-21 NOTE — TELEPHONE ENCOUNTER
Reports an incision from December is not healing and he feels as if it is infected. Reports swelling to the incision site. +Drainging white/blood. He reports he is returning call from Dr. Shipley. He is uninterested in being triaged as he wants to be seen today regardless. Would like to be seen in office but if not he agrees to be seen in the ED for further evaluation. Would like a call back in this regard.    Reason for Disposition   Health Information question, no triage required and triager able to answer question    Protocols used: INFORMATION ONLY CALL - NO TRIAGE-A-

## 2022-03-21 NOTE — TELEPHONE ENCOUNTER
I spoke to him on the phone.  He needs to come in today for a visit.  Please schedule an appointment and call him with the time.

## 2022-03-23 DIAGNOSIS — R25.1 EPISODE OF SHAKING: ICD-10-CM

## 2022-03-24 RX ORDER — LEVETIRACETAM 500 MG/1
1000 TABLET ORAL 2 TIMES DAILY
Qty: 360 TABLET | Refills: 3 | Status: SHIPPED | OUTPATIENT
Start: 2022-03-24 | End: 2023-04-21 | Stop reason: SDUPTHER

## 2022-03-26 ENCOUNTER — NURSE TRIAGE (OUTPATIENT)
Dept: ADMINISTRATIVE | Facility: CLINIC | Age: 37
End: 2022-03-26
Payer: MEDICARE

## 2022-03-26 ENCOUNTER — HOSPITAL ENCOUNTER (EMERGENCY)
Facility: HOSPITAL | Age: 37
Discharge: HOME | End: 2022-03-26
Attending: EMERGENCY MEDICINE
Payer: MEDICARE

## 2022-03-26 VITALS
HEART RATE: 62 BPM | DIASTOLIC BLOOD PRESSURE: 57 MMHG | WEIGHT: 157 LBS | SYSTOLIC BLOOD PRESSURE: 104 MMHG | BODY MASS INDEX: 26.8 KG/M2 | TEMPERATURE: 99 F | RESPIRATION RATE: 15 BRPM | HEIGHT: 64 IN | OXYGEN SATURATION: 96 %

## 2022-03-26 DIAGNOSIS — Z51.89 VISIT FOR WOUND CHECK: Primary | ICD-10-CM

## 2022-03-26 LAB
ALBUMIN SERPL BCP-MCNC: 3.8 G/DL (ref 3.5–5.2)
ALP SERPL-CCNC: 63 U/L (ref 55–135)
ALT SERPL W/O P-5'-P-CCNC: 27 U/L (ref 10–44)
ANION GAP SERPL CALC-SCNC: 8 MMOL/L (ref 8–16)
AST SERPL-CCNC: 22 U/L (ref 10–40)
BASOPHILS # BLD AUTO: 0.06 K/UL (ref 0–0.2)
BASOPHILS NFR BLD: 1 % (ref 0–1.9)
BILIRUB SERPL-MCNC: 0.6 MG/DL (ref 0.1–1)
BUN SERPL-MCNC: 22 MG/DL (ref 6–20)
CALCIUM SERPL-MCNC: 8.7 MG/DL (ref 8.7–10.5)
CHLORIDE SERPL-SCNC: 108 MMOL/L (ref 95–110)
CO2 SERPL-SCNC: 22 MMOL/L (ref 23–29)
CREAT SERPL-MCNC: 1.3 MG/DL (ref 0.5–1.4)
DIFFERENTIAL METHOD: ABNORMAL
EOSINOPHIL # BLD AUTO: 0.5 K/UL (ref 0–0.5)
EOSINOPHIL NFR BLD: 8.1 % (ref 0–8)
ERYTHROCYTE [DISTWIDTH] IN BLOOD BY AUTOMATED COUNT: 12.8 % (ref 11.5–14.5)
EST. GFR  (AFRICAN AMERICAN): >60 ML/MIN/1.73 M^2
EST. GFR  (NON AFRICAN AMERICAN): >60 ML/MIN/1.73 M^2
GLUCOSE SERPL-MCNC: 86 MG/DL (ref 70–110)
HCT VFR BLD AUTO: 46.2 % (ref 40–54)
HGB BLD-MCNC: 15 G/DL (ref 14–18)
IMM GRANULOCYTES # BLD AUTO: 0.02 K/UL (ref 0–0.04)
IMM GRANULOCYTES NFR BLD AUTO: 0.3 % (ref 0–0.5)
LYMPHOCYTES # BLD AUTO: 1.5 K/UL (ref 1–4.8)
LYMPHOCYTES NFR BLD: 24.7 % (ref 18–48)
MCH RBC QN AUTO: 28.2 PG (ref 27–31)
MCHC RBC AUTO-ENTMCNC: 32.5 G/DL (ref 32–36)
MCV RBC AUTO: 87 FL (ref 82–98)
MONOCYTES # BLD AUTO: 0.6 K/UL (ref 0.3–1)
MONOCYTES NFR BLD: 9.7 % (ref 4–15)
NEUTROPHILS # BLD AUTO: 3.4 K/UL (ref 1.8–7.7)
NEUTROPHILS NFR BLD: 56.2 % (ref 38–73)
NRBC BLD-RTO: 0 /100 WBC
PLATELET # BLD AUTO: 290 K/UL (ref 150–450)
PMV BLD AUTO: 9.9 FL (ref 9.2–12.9)
POTASSIUM SERPL-SCNC: 4.5 MMOL/L (ref 3.5–5.1)
PROT SERPL-MCNC: 6.8 G/DL (ref 6–8.4)
RBC # BLD AUTO: 5.31 M/UL (ref 4.6–6.2)
SODIUM SERPL-SCNC: 138 MMOL/L (ref 136–145)
WBC # BLD AUTO: 5.96 K/UL (ref 3.9–12.7)

## 2022-03-26 PROCEDURE — 87040 BLOOD CULTURE FOR BACTERIA: CPT | Performed by: EMERGENCY MEDICINE

## 2022-03-26 PROCEDURE — 85025 COMPLETE CBC W/AUTO DIFF WBC: CPT | Performed by: EMERGENCY MEDICINE

## 2022-03-26 PROCEDURE — 99283 EMERGENCY DEPT VISIT LOW MDM: CPT | Mod: 25

## 2022-03-26 PROCEDURE — 80053 COMPREHEN METABOLIC PANEL: CPT | Performed by: EMERGENCY MEDICINE

## 2022-03-26 PROCEDURE — 87086 URINE CULTURE/COLONY COUNT: CPT | Performed by: EMERGENCY MEDICINE

## 2022-03-26 PROCEDURE — 87070 CULTURE OTHR SPECIMN AEROBIC: CPT | Mod: 59 | Performed by: EMERGENCY MEDICINE

## 2022-03-26 RX ORDER — SILVER NITRATE 38.21; 12.74 MG/1; MG/1
1 STICK TOPICAL ONCE
Status: DISCONTINUED | OUTPATIENT
Start: 2022-03-26 | End: 2022-03-26

## 2022-03-26 NOTE — ED PROVIDER NOTES
Encounter Date: 3/26/2022       History     Chief Complaint   Patient presents with    Wound Check     Patient reports had bladder stimulator changed in December, had follow up missed this week with Surgeon, states incision site gas has yellow bloody drainage that started yesterday. Denies any difficulty with urination or fever.      36 y.o. male Past Medical History:  No date: Arthritis      Comment:  in knee  4/1/2016: Dysphagia, oropharyngeal  No date: Headache(784.0)  No date: Irregular heart beat  10/2/2012: Mass of larynx  No date: Neurofibromatosis syndrome  birth: Neurofibromatosis, type 1 (von Recklinghausen's disease)  7/9/2012: Neurofibromatosis, type 1 (von Recklinghausen's disease)     Presents for evaluation of poorly healing wound to his back stemming from bladder stimulator surgery on 12/10/21. States that everyday he has leaking from the site. Was seen by Dr. Shipley from urology        Review of patient's allergies indicates:  No Known Allergies  Past Medical History:   Diagnosis Date    Arthritis     in knee    Dysphagia, oropharyngeal 4/1/2016    Headache(784.0)     Irregular heart beat     Mass of larynx 10/2/2012    Neurofibromatosis syndrome     Neurofibromatosis, type 1 (von Recklinghausen's disease) birth    Neurofibromatosis, type 1 (von Recklinghausen's disease) 7/9/2012     Past Surgical History:   Procedure Laterality Date    ARM DEBRIDEMENT      NF - right    partial supraglottic laryngectomy  6/15/2012    REPLACEMENT OF SACRAL NERVE STIMULATOR N/A 12/10/2021    Procedure: REPLACEMENT, NEUROSTIMULATOR, SACRAL;  Surgeon: KIP Shipley MD;  Location: [facility];  Service: Urology;  Laterality: N/A;  [employer]  [facility] SARAH [phone] WILL BE HERE FOR THE CASE PER HANANE ON 12-1-2021 WILL BE HERE  RN Pre Op 11-23-21.  ---COVID NEGATIVE ON 12/8----- C A  IMPLANTS ORDERED ON 12-1-2021     Family History   Problem Relation Age of Onset    Cancer Father         neuro fibroma  mitosis    Cancer Paternal Uncle         neuro fibroma mitosis    Cancer Cousin         neuro fibroma mitosis     Social History     Tobacco Use    Smoking status: Former Smoker    Smokeless tobacco: Never Used   Substance Use Topics    Alcohol use: No    Drug use: No     Review of Systems   Constitutional: Negative for fever.   HENT: Negative for sore throat.    Respiratory: Negative for shortness of breath.    Cardiovascular: Negative for chest pain.   Gastrointestinal: Negative for nausea.   Genitourinary: Negative for dysuria.   Musculoskeletal: Negative for back pain.   Skin: Negative for rash.   Neurological: Negative for weakness.   Hematological: Does not bruise/bleed easily.   All other systems reviewed and are negative.      Physical Exam     Initial Vitals   BP Pulse Resp Temp SpO2   03/26/22 1341 03/26/22 1341 03/26/22 1341 03/26/22 1343 03/26/22 1341   126/60 80 18 98.4 °F (36.9 °C) 99 %      MAP       --                Physical Exam    Nursing note and vitals reviewed.  Constitutional: He appears well-developed and well-nourished.   HENT:   Head: Normocephalic and atraumatic.   Eyes: EOM are normal. Pupils are equal, round, and reactive to light.   Cardiovascular: Normal rate and regular rhythm.   Pulmonary/Chest: Effort normal.   Abdominal: He exhibits no distension.   Musculoskeletal:         General: No tenderness or edema.     Neurological: He is alert and oriented to person, place, and time. No cranial nerve deficit.   Skin: Skin is warm and dry.   Psychiatric: He has a normal mood and affect.     pinpoint wound on back with scant serosang drainage dressing  No erythema/purulence, non tender    ED Course   Procedures  Labs Reviewed   CBC W/ AUTO DIFFERENTIAL - Abnormal; Notable for the following components:       Result Value    Eosinophil % 8.1 (*)     All other components within normal limits   COMPREHENSIVE METABOLIC PANEL - Abnormal; Notable for the following components:    CO2 22 (*)      BUN 22 (*)     All other components within normal limits   CULTURE, BLOOD   CULTURE, BLOOD   CULTURE, URINE   CULTURE, AEROBIC  (SPECIFY SOURCE)          Imaging Results    None          Medications - No data to display      pt insist that I attempt to culture the scant drainage. I have d/w pt that it is low yield due to skin madeleine, but have nonetheless cleaned area with betadine and attempted to culture drainage            Have referred to wound care and contacted urology on call who will notify Dr. Shipley's clinic of the need for a followup appointment.       Clinical Impression:   Final diagnoses:  [Z51.89] Visit for wound check (Primary)          ED Disposition Condition    Discharge Stable        ED Prescriptions     None        Follow-up Information     Follow up With Specialties Details Why Contact Info    Liyah Spring,  [facility]   [address] [facility] [address]  [phone]      KIP Shipley MD Urology   [address]  [address]  [facility] [address]  [phone]             Paola Hernandez MD  03/26/22 1649       Paola Hernandez MD  03/26/22 1651

## 2022-03-26 NOTE — TELEPHONE ENCOUNTER
[place]    PCP:  Dr. Teresa    Reports that he was supposed to see Dr. Shipley and thought it was next Thursday therefore missed his appt.  He reports an incision that is infected, not healing, and leaking fluid (blood).  Surgery was  Dec 10, 2021.  Pt had a replacement neuro stimulator, sacral.  Denies fever, vomiting, painful urination, constipation, and pain.  Per protocol, care advised is go to the ED now.  Instructed to call for questions/concerns.  VU.      Reason for Disposition   Sounds like a serious complication to the triager    Additional Information   Negative: Sounds like a life-threatening emergency to the triager   Negative: [1] Widespread rash AND [2] bright red, sunburn-like   Negative: [1] SEVERE headache AND [2] after spinal (epidural) anesthesia   Negative: [1] Vomiting AND [2] persists > 4 hours   Negative: [1] Vomiting AND [2] abdomen looks much more swollen than usual   Negative: [1] Drinking very little AND [2] dehydration suspected (e.g., no urine > 12 hours, very dry mouth, very lightheaded)   Negative: Patient sounds very sick or weak to the triager    Protocols used: POST-OP SYMPTOMS AND [id number]

## 2022-03-26 NOTE — DISCHARGE INSTRUCTIONS
Thank you for coming to our Emergency Department today. It is important to remember that some problems are difficult to diagnose and may not be found during your Emergency Department visit. Be sure to follow up with your primary care doctor and review all labs/imaging/tests that were performed during this visit with them. Some labs/tests may be outside of the normal range and require non-emergent follow-up and further investigation to help diagnose/exclude/prevent complications or other medical conditions.    If you do not have a primary care doctor, you may contact the one listed on your discharge paperwork or you may also call the [facility] Appointment Desk at [phone] to schedule an appointment and establish care with one. It is important to your health that you have a primary care doctor.    Please take all medications as directed. All medications may potentially have side-effects and it is impossible to predict which medications may give you side-effects or what side-effects (if any) they will give you.. If you feel that you are having a negative effect or side-effect of any medication you should immediately stop taking them and seek medical attention. If you feel that you are having a life-threatening reaction call 911.    Return to the ER with any questions/concerns, new/concerning symptoms, worsening or failure to improve.     Do not drive, swim, climb to height, take a bath or make any important decisions for 24 hours if you have received any pain medications, sedatives or mood altering drugs during your ER visit.        BELOW THIS LINE ONLY APPLIES IF YOU HAVE A COVID TEST PENDING OR IF YOU HAVE BEEN DIAGNOSED WITH COVID:  Please access [facility] to review the results of your test. Until the results of your COVID test return, you should isolate yourself so as not to potentially spread illness to others.   If your COVID test returns positive, you should isolate yourself so as not to spread  illness to others. After five full days, if you are feeling better and you have not had fever for 24 hours, you can return to your typical daily activities, but you must wear a mask around others for an additional 5 days.   If your COVID test returns negative and you are either unvaccinated or more than six months out from your two-dose vaccine and are not yet boosted, you should still quarantine for 5 full days followed by strict mask use for an additional 5 full days.   If your COVID test returns negative and you have received your 2-dose initial vaccine as well as a booster, you should continue strict mask use for 10 full days after the exposure.  For all those exposed, best practice includes a test at day 5 after the exposure. This can be a home test or a test through one of the many testing centers throughout our community.   Masking is always advised to limit the spread of COVID. Cdc.gov is an excellent site to obtain the latest up to date recommendations regarding COVID and COVID testing.     CDC Testing and Quarantine Guidelines for patients with exposure to a known-positive COVID-19 person:  A close exposure is defined as anyone who has had an exposure (masked or unmasked) to a known COVID -19 positive person within 6 feet of someone for a cumulative total of 15 minutes or more over a 24-hour period.   Vaccinated and/or if you recently had a positive covid test within 90 days do NOT need to quarantine after contact with someone who had COVID-19 unless you develop symptoms.   Fully vaccinated people who have not had a positive test within 90 days, should get tested 3-5 days after their exposure, even if they don't have symptoms and wear a mask indoors in public for 14 days following exposure or until their test result is negative.      Unvaccinated and/or NOT had a positive test within 90 days and meet close exposure  You are required by CDC guidelines to quarantine for at least 5 days from time of  exposure followed by 5 days of strict masking. It is recommended, but not required to test after 5 days, unless you develop symptoms, in which case you should test at that time.  If you get tested after 5 days and your test is positive, your 5 day period of isolation starts the day of the positive test.    If your exposure does not meet the above definition, you can return to your normal daily activities to include social distancing, wearing a mask and frequent handwashing.      Here is a link to guidance from the CDC:  https://www.cdc.gov/media/releases/2021/s1227-isolation-quarantine-guidance.html      [facility]  [facility] Testing Sites:  https://ldh.[place].gov/page/3934      [employer] website with testing locations and guidance:  [email or web address]

## 2022-03-26 NOTE — ED TRIAGE NOTES
Pt reports to the ED with C/O bleeding and pus colored drainage coming from a surgical wound on the pt's back where a bladder stimulator was placed. Pt reports the surgery was 12/10/21 and the wound still has not healed. Pt reports that his urologist is aware of the drainage but couldn't see him in the office until April. Pt denies any difficulty urinating, fevers, chills, ABD pain, or SOB. ED workup in progress. Will monitor.

## 2022-03-28 ENCOUNTER — OFFICE VISIT (OUTPATIENT)
Dept: UROLOGY | Facility: CLINIC | Age: 37
End: 2022-03-28
Payer: MEDICARE

## 2022-03-28 ENCOUNTER — PATIENT MESSAGE (OUTPATIENT)
Dept: UROLOGY | Facility: CLINIC | Age: 37
End: 2022-03-28

## 2022-03-28 ENCOUNTER — TELEPHONE (OUTPATIENT)
Dept: UROLOGY | Facility: CLINIC | Age: 37
End: 2022-03-28
Payer: MEDICARE

## 2022-03-28 VITALS — BODY MASS INDEX: 26.79 KG/M2 | HEIGHT: 64 IN | WEIGHT: 156.94 LBS

## 2022-03-28 DIAGNOSIS — R33.9 INCOMPLETE EMPTYING OF BLADDER: Primary | ICD-10-CM

## 2022-03-28 DIAGNOSIS — N40.0 BPH WITHOUT OBSTRUCTION/LOWER URINARY TRACT SYMPTOMS: ICD-10-CM

## 2022-03-28 LAB — BACTERIA UR CULT: NO GROWTH

## 2022-03-28 PROCEDURE — 1159F PR MEDICATION LIST DOCUMENTED IN MEDICAL RECORD: ICD-10-PCS | Mod: CPTII,S$GLB,, | Performed by: UROLOGY

## 2022-03-28 PROCEDURE — 1160F PR REVIEW ALL MEDS BY PRESCRIBER/CLIN PHARMACIST DOCUMENTED: ICD-10-PCS | Mod: CPTII,S$GLB,, | Performed by: UROLOGY

## 2022-03-28 PROCEDURE — 99999 PR PBB SHADOW E&M-EST. PATIENT-LVL V: CPT | Mod: PBBFAC,,, | Performed by: UROLOGY

## 2022-03-28 PROCEDURE — 99214 OFFICE O/P EST MOD 30 MIN: CPT | Mod: S$GLB,,, | Performed by: UROLOGY

## 2022-03-28 PROCEDURE — 99214 PR OFFICE/OUTPT VISIT, EST, LEVL IV, 30-39 MIN: ICD-10-PCS | Mod: S$GLB,,, | Performed by: UROLOGY

## 2022-03-28 PROCEDURE — 1159F MED LIST DOCD IN RCRD: CPT | Mod: CPTII,S$GLB,, | Performed by: UROLOGY

## 2022-03-28 PROCEDURE — 3008F BODY MASS INDEX DOCD: CPT | Mod: CPTII,S$GLB,, | Performed by: UROLOGY

## 2022-03-28 PROCEDURE — 99999 PR PBB SHADOW E&M-EST. PATIENT-LVL V: ICD-10-PCS | Mod: PBBFAC,,, | Performed by: UROLOGY

## 2022-03-28 PROCEDURE — 3008F PR BODY MASS INDEX (BMI) DOCUMENTED: ICD-10-PCS | Mod: CPTII,S$GLB,, | Performed by: UROLOGY

## 2022-03-28 PROCEDURE — 1160F RVW MEDS BY RX/DR IN RCRD: CPT | Mod: CPTII,S$GLB,, | Performed by: UROLOGY

## 2022-03-28 NOTE — PROGRESS NOTES
Subjective:       Patient ID: Galindo Grant is a 36 y.o. male The patient's last visit with me was on 3/21/2022.   Chief Complaint:   Chief Complaint   Patient presents with    Follow-up       Incomplete Bladder Emptying  He has an Interstim in place for incomplete bladder emptying.  He is not sure which year, 2012 or 2013.  It was placed in [place].  He is not sure if it working.    10/26/2021  He would like to schedule replacement.  The [employer] rep has indicated the battery is non-functional.  He does not want to rechargeable type.    12/27/2021  He is s/p Interstim Replacement on 12/10/2021.  He noted some pain last night at the incision.  He feels that the device is working.    1/3/2022  He went to the ED on 12/30/2021.  He feels better.  He is concerned about the bacteria.     1/10/2022  He feels well.  He denies pain or swelling.  He is still using Neosporin.    2/7/2022  He is doing well.  His wound has completely healed.  He denies pain.  He device is working.    3/21/2022  He has had issues for a couple of days.  He has pain at the generator site.  He tells me it was swollen 2 days ago.  He has intermittent bleeding from the lead site.  He denies fever.    03/28/2022  He went to the ED the other day for a wound check.  He continues to have leaking and bleeding at the lead site.  The generator site is better but .        ACTIVE MEDICAL ISSUES:  Patient Active Problem List   Diagnosis    Neurofibromatosis, type 1 (von Recklinghausen's disease)    Other diseases of respiratory system, not elsewhere classified    Other symptoms involving respiratory system and chest    Systemic inflammatory response syndrome due to non-infectious process without acute organ dysfunction    Low back pain    Dysphagia, oropharyngeal    Chest pain, atypical    CHEEK (dyspnea on exertion)    Incomplete emptying of bladder       ALLERGIES AND MEDICATIONS: updated and reviewed.  Review of patient's allergies  indicates:  No Known Allergies  Current Outpatient Medications   Medication Sig    acetaminophen (TYLENOL) 500 MG tablet Take 1 tablet (500 mg total) by mouth every 6 (six) hours as needed for Pain.    atorvastatin (LIPITOR) 10 MG tablet Take 1 tablet (10 mg total) by mouth once daily.    bacitracin 500 unit/gram Oint Apply topically 2 (two) times daily.    baclofen (LIORESAL) 10 MG tablet Take 1 tablet (10 mg total) by mouth 3 (three) times daily.    calcium-vitamin D3 (CALCIUM 500 + D) 500 mg(1,250mg) -200 unit per tablet Take 1 tablet by mouth 2 (two) times daily with meals.    cyanocobalamin (VITAMIN B-12) 1000 MCG tablet Take 100 mcg by mouth 2 (two) times a day.    cyclobenzaprine (FLEXERIL) 5 MG tablet Take 1 tablet (5 mg total) by mouth nightly as needed for Muscle spasms.    diclofenac sodium (VOLTAREN) 1 % Gel Apply 2 g topically 4 (four) times daily.    FLUoxetine 20 MG capsule Take 1 capsule (20 mg total) by mouth once daily.    HYDROcodone-acetaminophen (NORCO) 7.5-325 mg per tablet Take 1 tablet by mouth every 6 (six) hours as needed for Pain.    hydrOXYzine HCl (ATARAX) 25 MG tablet Take 1 tablet (25 mg total) by mouth 3 (three) times daily. DO NOT DRIVE AFTER TAKING MED    ibuprofen (ADVIL,MOTRIN) 800 MG tablet Take 1 tablet (800 mg total) by mouth every 6 (six) hours as needed for Pain.    levETIRAcetam (KEPPRA) 500 MG Tab Take 2 tablets (1,000 mg total) by mouth 2 (two) times daily.    levothyroxine sodium (LEVOTHYROXINE ORAL) Take by mouth.    metoprolol tartrate (LOPRESSOR) 25 MG tablet Take 0.5 tablets (12.5 mg total) by mouth 2 (two) times daily.    mupirocin (BACTROBAN) 2 % ointment Apply topically 3 (three) times daily. (Patient not taking: No sig reported)    naproxen (NAPROSYN) 500 MG tablet Take 500 mg by mouth 2 (two) times daily.    nitroGLYCERIN (NITROSTAT) 0.4 MG SL tablet Place 1 tablet (0.4 mg total) under the tongue every 5 (five) minutes as needed for Chest pain  "(Repeat in 5 min if CP persists. Go to ER if CP worsens).    simvastatin (ZOCOR) 20 MG tablet Take 20 mg by mouth every evening.    sulfamethoxazole-trimethoprim 800-160mg (BACTRIM DS) 800-160 mg Tab Take 1 tablet by mouth 2 (two) times daily. for 14 days    sumatriptan (IMITREX) 50 MG tablet 1 tab PO x1 prn. May take another tablet after 2 hours if the headache recurs. Maximum of 4 tablets a day.    tramadol (ULTRAM) 50 mg tablet Take 1 tablet (50 mg total) by mouth every 12 (twelve) hours as needed for Pain.    vitamin D (VITAMIN D3) 1000 units Tab Take 1,000 Units by mouth 2 (two) times a day.     No current facility-administered medications for this visit.       Review of Systems   Constitutional: Negative for activity change, fatigue, fever and unexpected weight change.   HENT: Negative for congestion.    Eyes: Negative for redness.   Respiratory: Negative for chest tightness and shortness of breath.    Cardiovascular: Negative for chest pain and leg swelling.   Gastrointestinal: Negative for abdominal pain, constipation, diarrhea, nausea and vomiting.   Genitourinary: Negative for dysuria, flank pain, frequency, hematuria, penile pain, penile swelling, scrotal swelling, testicular pain and urgency.   Musculoskeletal: Negative for arthralgias and back pain.   Neurological: Negative for dizziness and light-headedness.   Psychiatric/Behavioral: Negative for behavioral problems and confusion. The patient is not nervous/anxious.    All other systems reviewed and are negative.      Objective:      Vitals:    03/28/22 1613   Weight: 71.2 kg (156 lb 15.5 oz)   Height: 5' 4" (1.626 m)     Physical Exam  Vitals and nursing note reviewed.   Constitutional:       Appearance: He is well-developed.   HENT:      Head: Normocephalic.   Eyes:      Conjunctiva/sclera: Conjunctivae normal.   Neck:      Thyroid: No thyromegaly.      Trachea: No tracheal deviation.   Cardiovascular:      Rate and Rhythm: Normal rate.      " Heart sounds: Normal heart sounds.   Pulmonary:      Effort: Pulmonary effort is normal. No respiratory distress.      Breath sounds: Normal breath sounds. No wheezing.   Abdominal:      General: Bowel sounds are normal.      Palpations: Abdomen is soft.      Tenderness: There is no abdominal tenderness. There is no rebound.      Hernia: No hernia is present.   Musculoskeletal:         General: No tenderness. Normal range of motion.      Cervical back: Normal range of motion and neck supple.      Comments: Right superior buttock incision intact, no erythema but is tender laterally    Left midline incision no erythema, closed, no active bleeding or oozing today   Lymphadenopathy:      Cervical: No cervical adenopathy.   Skin:     General: Skin is warm and dry.      Findings: No erythema or rash.   Neurological:      Mental Status: He is alert and oriented to person, place, and time.   Psychiatric:         Behavior: Behavior normal.         Thought Content: Thought content normal.         Judgment: Judgment normal.         Urine dipstick shows negative for all components.  Micro exam: negative for WBC's or RBC's.        Assessment:       1. Incomplete emptying of bladder    2. BPH without obstruction/lower urinary tract symptoms          Plan:       1. Incomplete emptying of bladder  Stay on Bactrim  Plan to explant the sacral nerve stimulator lead and generator.  I will take cultures at the time of explant.  Plan to replace in about 6 weeks.    2. BPH without obstruction/lower urinary tract symptoms  stable             No follow-ups on file.

## 2022-03-29 ENCOUNTER — ANESTHESIA EVENT (OUTPATIENT)
Dept: SURGERY | Facility: HOSPITAL | Age: 37
End: 2022-03-29
Payer: MEDICARE

## 2022-03-29 ENCOUNTER — HOSPITAL ENCOUNTER (OUTPATIENT)
Dept: PREADMISSION TESTING | Facility: HOSPITAL | Age: 37
Discharge: HOME | End: 2022-03-29
Attending: UROLOGY
Payer: MEDICARE

## 2022-03-29 VITALS
HEART RATE: 65 BPM | HEIGHT: 64 IN | DIASTOLIC BLOOD PRESSURE: 79 MMHG | WEIGHT: 159.19 LBS | OXYGEN SATURATION: 99 % | TEMPERATURE: 98 F | SYSTOLIC BLOOD PRESSURE: 115 MMHG | RESPIRATION RATE: 16 BRPM | BODY MASS INDEX: 27.18 KG/M2

## 2022-03-29 DIAGNOSIS — Z01.812 ENCOUNTER FOR PREOPERATIVE SCREENING LABORATORY TESTING FOR COVID-19 VIRUS: ICD-10-CM

## 2022-03-29 DIAGNOSIS — Z11.52 ENCOUNTER FOR PREOPERATIVE SCREENING LABORATORY TESTING FOR COVID-19 VIRUS: ICD-10-CM

## 2022-03-29 LAB — SARS-COV-2 RDRP RESP QL NAA+PROBE: NEGATIVE

## 2022-03-29 PROCEDURE — U0002 COVID-19 LAB TEST NON-CDC: HCPCS | Performed by: UROLOGY

## 2022-03-29 NOTE — DISCHARGE INSTRUCTIONS
"  Your surgery is scheduled for _Wednesday March 30, 2022_.    Call [phone] between 2 p.m. and 5 p.m. on   __Today_ to find out your arrival time for the day of your surgery.      Please report to SAME DAY SURGERY UNIT on the 2nd FLOOR at _______ a.m.  Use front door entrance. The doors open at 0530 am.      If you need WHEELCHAIR assistance please call  [phone] from your cell phone or "0"  from the  hospital courtesy phone located to the right after you enter the hospital lobby.      INSTRUCTIONS IMPORTANT!!!  ¨ Do not eat  after 12 midnight-. OK to brush teeth, no   gum, candy or mints!    MAY DRINK WATER UNTIL HOSPITAL ARRIVAL TIME    ¨ Take only these medicines with a small swallow of water-morning of surgery.  Take med's checked on MED LIST      _x___  Prep instructions:   SHOWER     _x___  Please shower using Hibiclens soap the night before AND  the morning of your surgery/procedure. Do not use Hibiclens on your face or genitals     _x___  No powder, lotions or creams to your body.  _x___  You may wear only deodorant on the day of surgery.  _x___  Please remove all jewelry, including piercings and leave at home.  __x__  No money or valuables needed. Please leave at home.  You may bring your cell phone.  _x___  Please bring any documents given by your doctor.  __x__  If going home the same day, arrange for a ride home. You will not be able to   drive if Anesthesia was used.  _x___  Wear loose fitting clothing. Allow for dressings, bandages.  __x__  Stop Aspirin, Ibuprofen, Motrin and Aleve at least 3-5 days before surgery, unless otherwise instructed by your doctor, or the nurse.         x     You MAY use Tylenol/acetaminophen until day  of surgery.    __x__  Call MD for temperature above 101 degrees.        ____ Stop taking any Fish Oil supplement or                   Vitamin E at least 5 days prior to surgery.          I have read or had read and explained to me, and understand the above " information.  Additional comments or instructions:Please call   [phone] if you have any questions regarding the instructions above.

## 2022-03-29 NOTE — ANESTHESIA PREPROCEDURE EVALUATION
03/29/2022  Galindo Grant is a 36 y.o., male   To undergo Procedure(s) (LRB):  REMOVAL, NEUROSTIMULATOR, SACRAL (N/A)     Denies CP/SOB/GERD/MI/CVA/URI symptoms.  Previous intubation with video laryngoscope without problems.  METS > 4  NPO > 8    Past Medical History:  Past Medical History:   Diagnosis Date    Arthritis     in knee    Dysphagia, oropharyngeal 4/1/2016    Headache(784.0)     Irregular heart beat     Mass of larynx 10/2/2012    Neurofibromatosis syndrome     Neurofibromatosis, type 1 (von Recklinghausen's disease) birth    Neurofibromatosis, type 1 (von Recklinghausen's disease) 7/9/2012       Past Surgical History:  Past Surgical History:   Procedure Laterality Date    ARM DEBRIDEMENT      NF - right    partial supraglottic laryngectomy  6/15/2012    REPLACEMENT OF SACRAL NERVE STIMULATOR N/A 12/10/2021    Procedure: REPLACEMENT, NEUROSTIMULATOR, SACRAL;  Surgeon: KIP Shipley MD;  Location: [facility];  Service: Urology;  Laterality: N/A;  [employer]  [facility] SARAH [phone] WILL BE HERE FOR THE CASE PER HANANE ON 12-1-2021 WILL BE HERE  RN Pre Op 11-23-21.  ---COVID NEGATIVE ON 12/8----- C A  IMPLANTS ORDERED ON 12-1-2021       Social History:  Social History     Socioeconomic History    Marital status:     Number of children: 1    Years of education: 12   Occupational History    Occupation:      Employer: OTHER   Tobacco Use    Smoking status: Former Smoker    Smokeless tobacco: Never Used   Substance and Sexual Activity    Alcohol use: No    Drug use: No    Sexual activity: Yes     Partners: Female   Social History Narrative    Moved back to [facility] in 2015. Now .        Medications:  No current facility-administered medications on file prior to encounter.     Current Outpatient Medications on File Prior to Encounter   Medication Sig Dispense  Refill    acetaminophen (TYLENOL) 500 MG tablet Take 1 tablet (500 mg total) by mouth every 6 (six) hours as needed for Pain. 30 tablet 0    atorvastatin (LIPITOR) 10 MG tablet Take 1 tablet (10 mg total) by mouth once daily. 90 tablet 1    bacitracin 500 unit/gram Oint Apply topically 2 (two) times daily. 30 g 0    baclofen (LIORESAL) 10 MG tablet Take 1 tablet (10 mg total) by mouth 3 (three) times daily. 90 tablet 11    calcium-vitamin D3 (CALCIUM 500 + D) 500 mg(1,250mg) -200 unit per tablet Take 1 tablet by mouth 2 (two) times daily with meals. 180 tablet 3    cyanocobalamin (VITAMIN B-12) 1000 MCG tablet Take 100 mcg by mouth 2 (two) times a day.      cyclobenzaprine (FLEXERIL) 5 MG tablet Take 1 tablet (5 mg total) by mouth nightly as needed for Muscle spasms. 90 tablet 1    diclofenac sodium (VOLTAREN) 1 % Gel Apply 2 g topically 4 (four) times daily. 100 g 5    FLUoxetine 20 MG capsule Take 1 capsule (20 mg total) by mouth once daily. 90 capsule 3    HYDROcodone-acetaminophen (NORCO) 7.5-325 mg per tablet Take 1 tablet by mouth every 6 (six) hours as needed for Pain. 28 tablet 0    hydrOXYzine HCl (ATARAX) 25 MG tablet Take 1 tablet (25 mg total) by mouth 3 (three) times daily. DO NOT DRIVE AFTER TAKING MED 90 tablet 5    ibuprofen (ADVIL,MOTRIN) 800 MG tablet Take 1 tablet (800 mg total) by mouth every 6 (six) hours as needed for Pain. 20 tablet 0    levETIRAcetam (KEPPRA) 500 MG Tab Take 2 tablets (1,000 mg total) by mouth 2 (two) times daily. 360 tablet 3    levothyroxine sodium (LEVOTHYROXINE ORAL) Take by mouth.      metoprolol tartrate (LOPRESSOR) 25 MG tablet Take 0.5 tablets (12.5 mg total) by mouth 2 (two) times daily. 90 tablet 1    mupirocin (BACTROBAN) 2 % ointment Apply topically 3 (three) times daily. 30 g 0    naproxen (NAPROSYN) 500 MG tablet Take 500 mg by mouth 2 (two) times daily.      nitroGLYCERIN (NITROSTAT) 0.4 MG SL tablet Place 1 tablet (0.4 mg total) under the  tongue every 5 (five) minutes as needed for Chest pain (Repeat in 5 min if CP persists. Go to ER if CP worsens). 30 tablet 2    simvastatin (ZOCOR) 20 MG tablet Take 20 mg by mouth every evening.      sulfamethoxazole-trimethoprim 800-160mg (BACTRIM DS) 800-160 mg Tab Take 1 tablet by mouth 2 (two) times daily. for 14 days 28 tablet 0    sumatriptan (IMITREX) 50 MG tablet 1 tab PO x1 prn. May take another tablet after 2 hours if the headache recurs. Maximum of 4 tablets a day. 12 tablet 1    tramadol (ULTRAM) 50 mg tablet Take 1 tablet (50 mg total) by mouth every 12 (twelve) hours as needed for Pain. 60 tablet 1    vitamin D (VITAMIN D3) 1000 units Tab Take 1,000 Units by mouth 2 (two) times a day.         Allergies:  Review of patient's allergies indicates:  No Known Allergies    Active Problems:  Patient Active Problem List   Diagnosis    Neurofibromatosis, type 1 (von Recklinghausen's disease)    Other diseases of respiratory system, not elsewhere classified    Other symptoms involving respiratory system and chest    Systemic inflammatory response syndrome due to non-infectious process without acute organ dysfunction    Low back pain    Dysphagia, oropharyngeal    Chest pain, atypical    CHEEK (dyspnea on exertion)    Incomplete emptying of bladder       Diagnostic Studies:   Latest Reference Range & Units 03/26/22 14:20   WBC 3.90 - 12.70 K/uL 5.96   RBC 4.60 - 6.20 M/uL 5.31   Hemoglobin 14.0 - 18.0 g/dL 15.0   Hematocrit 40.0 - 54.0 % 46.2   MCV 82 - 98 fL 87   MCH 27.0 - 31.0 pg 28.2   MCHC 32.0 - 36.0 g/dL 32.5   RDW 11.5 - 14.5 % 12.8   Platelets 150 - 450 K/uL 290   MPV 9.2 - 12.9 fL 9.9   Gran % 38.0 - 73.0 % 56.2   Lymph % 18.0 - 48.0 % 24.7   Mono % 4.0 - 15.0 % 9.7   Eosinophil % 0.0 - 8.0 % 8.1 (H)   Basophil % 0.0 - 1.9 % 1.0   Immature Granulocytes 0.0 - 0.5 % 0.3   Gran # (ANC) 1.8 - 7.7 K/uL 3.4   Lymph # 1.0 - 4.8 K/uL 1.5   Mono # 0.3 - 1.0 K/uL 0.6   Eos # 0.0 - 0.5 K/uL 0.5   Baso  # 0.00 - 0.20 K/uL 0.06   Immature Grans (Abs) 0.00 - 0.04 K/uL 0.02 [1]   NRBC 0 /100 WBC 0   Differential Method  Automated      Latest Reference Range & Units 03/26/22 14:20   Sodium 136 - 145 mmol/L 138   Potassium 3.5 - 5.1 mmol/L 4.5   Chloride 95 - 110 mmol/L 108   CO2 23 - 29 mmol/L 22 (L)   Anion Gap 8 - 16 mmol/L 8   BUN 6 - 20 mg/dL 22 (H)   Creatinine 0.5 - 1.4 mg/dL 1.3   EGFR if non African American >60 mL/min/1.73 m^2 >60 [1]   EGFR if African American >60 mL/min/1.73 m^2 >60   Glucose 70 - 110 mg/dL 86   Calcium 8.7 - 10.5 mg/dL 8.7   Alkaline Phosphatase 55 - 135 U/L 63   PROTEIN TOTAL 6.0 - 8.4 g/dL 6.8   Albumin 3.5 - 5.2 g/dL 3.8   BILIRUBIN TOTAL 0.1 - 1.0 mg/dL 0.6 [2]   AST 10 - 40 U/L 22   ALT 10 - 44 U/L 27     EKG (2/23/22):  Sinus tachycardia   Cannot rule out Inferior infarct ,age undetermined     TTE (12/7/21):  · The left ventricle is normal in size with normal systolic function.  · The estimated ejection fraction is 55%.  · Normal left ventricular diastolic function.  · Normal right ventricular size with normal right ventricular systolic function.  · Normal central venous pressure (3 mmHg).  · The estimated PA systolic pressure is 20 mmHg.    24 Hour Vitals:  Temp:  [36.4 °C (97.6 °F)] 36.4 °C (97.6 °F)  Pulse:  [65] 65  Resp:  [16] 16  SpO2:  [99 %] 99 %  BP: (115)/(79) 115/79   See Nursing Charting For Additional Vitals    Pre-op Assessment    I have reviewed the Patient Summary Reports.     I have reviewed the Nursing Notes. I have reviewed the NPO Status.   I have reviewed the Medications.     Review of Systems  Anesthesia Hx:  No problems with previous Anesthesia  Denies Family Hx of Anesthesia complications.   Denies Personal Hx of Anesthesia complications.   Social:  Former Smoker, No Alcohol Use    Hematology/Oncology:  Hematology Normal   Oncology Normal     EENT/Dental:EENT/Dental Normal   Cardiovascular:   Exercise tolerance: good hyperlipidemia     ECG has been reviewed.   Functional Capacity good / => 4 METS, Ambulates wtih a cane    Pulmonary:   Denies Shortness of breath.    Renal/:  Renal/ Normal     Hepatic/GI:   Dysphagia   Musculoskeletal:  Musculoskeletal Normal    Neurological:   Headaches Seizures NF1  Last seizure last week   Endocrine:  Endocrine Normal    Dermatological:  Skin Normal    Psych:  Psychiatric Normal           Physical Exam  General: Well nourished    Airway:  Mallampati: II   Mouth Opening: Normal  TM Distance: Normal      Dental:  Periodontal disease  Denies any loose teeth      Anesthesia Plan  Type of Anesthesia, risks & benefits discussed:    Anesthesia Type: Gen ETT  Intra-op Monitoring Plan: Standard ASA Monitors  Post Op Pain Control Plan: multimodal analgesia and IV/PO Opioids PRN  Induction:  IV  Airway Plan: Video, Post-Induction  Informed Consent: Informed consent signed with the Patient and all parties understand the risks and agree with anesthesia plan.  All questions answered.   ASA Score: 3  Anesthesia Plan Notes: Patient seen by Dr. Rodriguez 12/2/21- was cleared for previous stimulator procedure at low cardiac risk  Patient denies any changes since last visit. Denies chest pain or CHEEK    GA with OETT  Standard ASA monitors  Recovery in PACU  PONV: 2    Ready For Surgery From Anesthesia Perspective.     .

## 2022-03-30 ENCOUNTER — ANESTHESIA (OUTPATIENT)
Dept: SURGERY | Facility: HOSPITAL | Age: 37
End: 2022-03-30
Payer: MEDICARE

## 2022-03-30 ENCOUNTER — HOSPITAL ENCOUNTER (OUTPATIENT)
Facility: HOSPITAL | Age: 37
Discharge: HOME | End: 2022-03-30
Attending: UROLOGY | Admitting: UROLOGY
Payer: MEDICARE

## 2022-03-30 VITALS
RESPIRATION RATE: 12 BRPM | HEART RATE: 76 BPM | SYSTOLIC BLOOD PRESSURE: 126 MMHG | TEMPERATURE: 98 F | DIASTOLIC BLOOD PRESSURE: 60 MMHG | OXYGEN SATURATION: 95 %

## 2022-03-30 DIAGNOSIS — Z11.52 ENCOUNTER FOR PREOPERATIVE SCREENING LABORATORY TESTING FOR COVID-19 VIRUS: ICD-10-CM

## 2022-03-30 DIAGNOSIS — Z01.812 ENCOUNTER FOR PREOPERATIVE SCREENING LABORATORY TESTING FOR COVID-19 VIRUS: ICD-10-CM

## 2022-03-30 DIAGNOSIS — R33.9 INCOMPLETE EMPTYING OF BLADDER: Primary | ICD-10-CM

## 2022-03-30 LAB
BACTERIA BLD CULT: NORMAL
BACTERIA BLD CULT: NORMAL
BACTERIA SPEC AEROBE CULT: NO GROWTH

## 2022-03-30 PROCEDURE — 63600175 PHARM REV CODE 636 W HCPCS: Performed by: UROLOGY

## 2022-03-30 PROCEDURE — 25000003 PHARM REV CODE 250: Performed by: UROLOGY

## 2022-03-30 PROCEDURE — 63600175 PHARM REV CODE 636 W HCPCS: Performed by: REGISTERED NURSE

## 2022-03-30 PROCEDURE — 63600175 PHARM REV CODE 636 W HCPCS: Performed by: ANESTHESIOLOGY

## 2022-03-30 PROCEDURE — 87070 CULTURE OTHR SPECIMN AEROBIC: CPT | Performed by: UROLOGY

## 2022-03-30 PROCEDURE — 36000706: Performed by: UROLOGY

## 2022-03-30 PROCEDURE — 87186 SC STD MICRODIL/AGAR DIL: CPT | Performed by: UROLOGY

## 2022-03-30 PROCEDURE — 88300 SURGICAL PATH GROSS: CPT | Mod: 26,59,, | Performed by: PATHOLOGY

## 2022-03-30 PROCEDURE — 87075 CULTR BACTERIA EXCEPT BLOOD: CPT | Performed by: UROLOGY

## 2022-03-30 PROCEDURE — 71000033 HC RECOVERY, INTIAL HOUR: Performed by: UROLOGY

## 2022-03-30 PROCEDURE — D9220A PRA ANESTHESIA: ICD-10-PCS | Mod: ANES,,, | Performed by: ANESTHESIOLOGY

## 2022-03-30 PROCEDURE — 25000003 PHARM REV CODE 250: Performed by: REGISTERED NURSE

## 2022-03-30 PROCEDURE — D9220A PRA ANESTHESIA: Mod: ANES,,, | Performed by: ANESTHESIOLOGY

## 2022-03-30 PROCEDURE — 63600175 PHARM REV CODE 636 W HCPCS: Performed by: STUDENT IN AN ORGANIZED HEALTH CARE EDUCATION/TRAINING PROGRAM

## 2022-03-30 PROCEDURE — 25000003 PHARM REV CODE 250: Performed by: STUDENT IN AN ORGANIZED HEALTH CARE EDUCATION/TRAINING PROGRAM

## 2022-03-30 PROCEDURE — 71000039 HC RECOVERY, EACH ADD'L HOUR: Performed by: UROLOGY

## 2022-03-30 PROCEDURE — 88300 SURGICAL PATH GROSS: CPT | Performed by: PATHOLOGY

## 2022-03-30 PROCEDURE — 88305 TISSUE EXAM BY PATHOLOGIST: CPT | Mod: 26,,, | Performed by: PATHOLOGY

## 2022-03-30 PROCEDURE — 88305 TISSUE EXAM BY PATHOLOGIST: ICD-10-PCS | Mod: 26,,, | Performed by: PATHOLOGY

## 2022-03-30 PROCEDURE — 88300 PR  SURG PATH,GROSS,LEVEL I: ICD-10-PCS | Mod: 26,59,, | Performed by: PATHOLOGY

## 2022-03-30 PROCEDURE — 87077 CULTURE AEROBIC IDENTIFY: CPT | Performed by: UROLOGY

## 2022-03-30 PROCEDURE — 64590 PR IMPLANT PERIPH/GASTRIC NEUROSTIM/RECEIVER: ICD-10-PCS | Mod: ,,, | Performed by: UROLOGY

## 2022-03-30 PROCEDURE — 36000707: Performed by: UROLOGY

## 2022-03-30 PROCEDURE — 37000008 HC ANESTHESIA 1ST 15 MINUTES: Performed by: UROLOGY

## 2022-03-30 PROCEDURE — 88305 TISSUE EXAM BY PATHOLOGIST: CPT | Performed by: PATHOLOGY

## 2022-03-30 PROCEDURE — 71000015 HC POSTOP RECOV 1ST HR: Performed by: UROLOGY

## 2022-03-30 PROCEDURE — 37000009 HC ANESTHESIA EA ADD 15 MINS: Performed by: UROLOGY

## 2022-03-30 PROCEDURE — D9220A PRA ANESTHESIA: ICD-10-PCS | Mod: CRNA,,, | Performed by: REGISTERED NURSE

## 2022-03-30 PROCEDURE — D9220A PRA ANESTHESIA: Mod: CRNA,,, | Performed by: REGISTERED NURSE

## 2022-03-30 PROCEDURE — 25000003 PHARM REV CODE 250: Performed by: ANESTHESIOLOGY

## 2022-03-30 PROCEDURE — 71000016 HC POSTOP RECOV ADDL HR: Performed by: UROLOGY

## 2022-03-30 PROCEDURE — 64590 INS/RPL PRPH SAC/GSTR NPG/R: CPT | Mod: ,,, | Performed by: UROLOGY

## 2022-03-30 RX ORDER — SUCCINYLCHOLINE CHLORIDE 20 MG/ML
INJECTION INTRAMUSCULAR; INTRAVENOUS
Status: DISCONTINUED | OUTPATIENT
Start: 2022-03-30 | End: 2022-03-31

## 2022-03-30 RX ORDER — KETAMINE HYDROCHLORIDE 100 MG/ML
INJECTION, SOLUTION INTRAMUSCULAR; INTRAVENOUS
Status: DISCONTINUED | OUTPATIENT
Start: 2022-03-30 | End: 2022-03-31

## 2022-03-30 RX ORDER — HYDROMORPHONE HYDROCHLORIDE 2 MG/ML
0.2 INJECTION, SOLUTION INTRAMUSCULAR; INTRAVENOUS; SUBCUTANEOUS EVERY 5 MIN PRN
Status: DISCONTINUED | OUTPATIENT
Start: 2022-03-30 | End: 2022-03-30 | Stop reason: HOSPADM

## 2022-03-30 RX ORDER — LIDOCAINE HYDROCHLORIDE 20 MG/ML
INJECTION INTRAVENOUS
Status: DISCONTINUED | OUTPATIENT
Start: 2022-03-30 | End: 2022-03-31

## 2022-03-30 RX ORDER — BUPIVACAINE HYDROCHLORIDE 5 MG/ML
INJECTION, SOLUTION PERINEURAL
Status: DISCONTINUED | OUTPATIENT
Start: 2022-03-30 | End: 2022-03-30 | Stop reason: HOSPADM

## 2022-03-30 RX ORDER — HALOPERIDOL 5 MG/ML
0.5 INJECTION INTRAMUSCULAR EVERY 10 MIN PRN
Status: DISCONTINUED | OUTPATIENT
Start: 2022-03-30 | End: 2022-03-30 | Stop reason: HOSPADM

## 2022-03-30 RX ORDER — FENTANYL CITRATE 50 UG/ML
INJECTION, SOLUTION INTRAMUSCULAR; INTRAVENOUS
Status: DISCONTINUED | OUTPATIENT
Start: 2022-03-30 | End: 2022-03-31

## 2022-03-30 RX ORDER — ACETAMINOPHEN 500 MG
1000 TABLET ORAL
Status: COMPLETED | OUTPATIENT
Start: 2022-03-30 | End: 2022-03-30

## 2022-03-30 RX ORDER — HYDROCODONE BITARTRATE AND ACETAMINOPHEN 10; 325 MG/1; MG/1
1 TABLET ORAL EVERY 4 HOURS PRN
Status: DISCONTINUED | OUTPATIENT
Start: 2022-03-30 | End: 2022-03-30 | Stop reason: HOSPADM

## 2022-03-30 RX ORDER — PHENAZOPYRIDINE HYDROCHLORIDE 100 MG/1
200 TABLET, FILM COATED ORAL ONCE
Status: COMPLETED | OUTPATIENT
Start: 2022-03-30 | End: 2022-03-30

## 2022-03-30 RX ORDER — PHENYLEPHRINE HYDROCHLORIDE 10 MG/ML
INJECTION INTRAVENOUS
Status: DISCONTINUED | OUTPATIENT
Start: 2022-03-30 | End: 2022-03-31

## 2022-03-30 RX ORDER — VANCOMYCIN HCL IN 5 % DEXTROSE 1G/250ML
1000 PLASTIC BAG, INJECTION (ML) INTRAVENOUS
Status: COMPLETED | OUTPATIENT
Start: 2022-03-30 | End: 2022-03-30

## 2022-03-30 RX ORDER — DEXAMETHASONE SODIUM PHOSPHATE 4 MG/ML
INJECTION, SOLUTION INTRA-ARTICULAR; INTRALESIONAL; INTRAMUSCULAR; INTRAVENOUS; SOFT TISSUE
Status: DISCONTINUED | OUTPATIENT
Start: 2022-03-30 | End: 2022-03-31

## 2022-03-30 RX ORDER — ONDANSETRON 2 MG/ML
4 INJECTION INTRAMUSCULAR; INTRAVENOUS DAILY PRN
Status: DISCONTINUED | OUTPATIENT
Start: 2022-03-30 | End: 2022-03-30 | Stop reason: HOSPADM

## 2022-03-30 RX ORDER — HYDROCODONE BITARTRATE AND ACETAMINOPHEN 7.5; 325 MG/1; MG/1
1 TABLET ORAL EVERY 6 HOURS PRN
Qty: 28 TABLET | Refills: 0 | Status: SHIPPED | OUTPATIENT
Start: 2022-03-30

## 2022-03-30 RX ORDER — ONDANSETRON 2 MG/ML
INJECTION INTRAMUSCULAR; INTRAVENOUS
Status: DISCONTINUED | OUTPATIENT
Start: 2022-03-30 | End: 2022-03-31

## 2022-03-30 RX ORDER — SULFAMETHOXAZOLE AND TRIMETHOPRIM 800; 160 MG/1; MG/1
1 TABLET ORAL 2 TIMES DAILY
Qty: 14 TABLET | Refills: 0 | Status: SHIPPED | OUTPATIENT
Start: 2022-03-30 | End: 2022-04-06

## 2022-03-30 RX ORDER — SODIUM CHLORIDE, SODIUM LACTATE, POTASSIUM CHLORIDE, CALCIUM CHLORIDE 600; 310; 30; 20 MG/100ML; MG/100ML; MG/100ML; MG/100ML
INJECTION, SOLUTION INTRAVENOUS CONTINUOUS
Status: DISCONTINUED | OUTPATIENT
Start: 2022-03-30 | End: 2022-03-30 | Stop reason: HOSPADM

## 2022-03-30 RX ORDER — PROPOFOL 10 MG/ML
VIAL (ML) INTRAVENOUS
Status: DISCONTINUED | OUTPATIENT
Start: 2022-03-30 | End: 2022-03-31

## 2022-03-30 RX ORDER — SODIUM CHLORIDE 0.9 % (FLUSH) 0.9 %
10 SYRINGE (ML) INJECTION
Status: DISCONTINUED | OUTPATIENT
Start: 2022-03-30 | End: 2022-03-30 | Stop reason: HOSPADM

## 2022-03-30 RX ORDER — LIDOCAINE HYDROCHLORIDE 10 MG/ML
1 INJECTION, SOLUTION EPIDURAL; INFILTRATION; INTRACAUDAL; PERINEURAL ONCE
Status: DISCONTINUED | OUTPATIENT
Start: 2022-03-30 | End: 2022-03-30 | Stop reason: HOSPADM

## 2022-03-30 RX ORDER — HYDROCODONE BITARTRATE AND ACETAMINOPHEN 5; 325 MG/1; MG/1
1 TABLET ORAL EVERY 4 HOURS PRN
Status: DISCONTINUED | OUTPATIENT
Start: 2022-03-30 | End: 2022-03-30 | Stop reason: HOSPADM

## 2022-03-30 RX ORDER — MIDAZOLAM HYDROCHLORIDE 1 MG/ML
INJECTION INTRAMUSCULAR; INTRAVENOUS
Status: DISCONTINUED | OUTPATIENT
Start: 2022-03-30 | End: 2022-03-31

## 2022-03-30 RX ORDER — VANCOMYCIN HYDROCHLORIDE 1 G/20ML
INJECTION, POWDER, LYOPHILIZED, FOR SOLUTION INTRAVENOUS
Status: DISCONTINUED | OUTPATIENT
Start: 2022-03-30 | End: 2022-03-30 | Stop reason: HOSPADM

## 2022-03-30 RX ADMIN — SODIUM CHLORIDE, SODIUM LACTATE, POTASSIUM CHLORIDE, AND CALCIUM CHLORIDE: .6; .31; .03; .02 INJECTION, SOLUTION INTRAVENOUS at 09:03

## 2022-03-30 RX ADMIN — HYDROMORPHONE HYDROCHLORIDE 0.2 MG: 2 INJECTION INTRAMUSCULAR; INTRAVENOUS; SUBCUTANEOUS at 01:03

## 2022-03-30 RX ADMIN — KETAMINE HYDROCHLORIDE 25 MG: 100 INJECTION, SOLUTION, CONCENTRATE INTRAMUSCULAR; INTRAVENOUS at 12:03

## 2022-03-30 RX ADMIN — SUCCINYLCHOLINE CHLORIDE 100 MG: 20 INJECTION, SOLUTION INTRAMUSCULAR; INTRAVENOUS at 11:03

## 2022-03-30 RX ADMIN — ONDANSETRON 4 MG: 2 INJECTION, SOLUTION INTRAMUSCULAR; INTRAVENOUS at 12:03

## 2022-03-30 RX ADMIN — PROPOFOL 140 MG: 10 INJECTION, EMULSION INTRAVENOUS at 11:03

## 2022-03-30 RX ADMIN — PHENAZOPYRIDINE HYDROCHLORIDE 200 MG: 100 TABLET ORAL at 01:03

## 2022-03-30 RX ADMIN — LIDOCAINE HYDROCHLORIDE 100 MG: 20 INJECTION, SOLUTION INTRAVENOUS at 11:03

## 2022-03-30 RX ADMIN — PHENYLEPHRINE HYDROCHLORIDE 200 MCG: 10 INJECTION INTRAVENOUS at 12:03

## 2022-03-30 RX ADMIN — ACETAMINOPHEN 1000 MG: 500 TABLET ORAL at 09:03

## 2022-03-30 RX ADMIN — FENTANYL CITRATE 100 MCG: 50 INJECTION, SOLUTION INTRAMUSCULAR; INTRAVENOUS at 11:03

## 2022-03-30 RX ADMIN — MIDAZOLAM HYDROCHLORIDE 2 MG: 1 INJECTION, SOLUTION INTRAMUSCULAR; INTRAVENOUS at 11:03

## 2022-03-30 RX ADMIN — VANCOMYCIN HYDROCHLORIDE 1000 MG: 1 INJECTION, POWDER, LYOPHILIZED, FOR SOLUTION INTRAVENOUS at 11:03

## 2022-03-30 RX ADMIN — DEXAMETHASONE SODIUM PHOSPHATE 4 MG: 4 INJECTION, SOLUTION INTRAMUSCULAR; INTRAVENOUS at 11:03

## 2022-03-30 RX ADMIN — GENTAMICIN SULFATE 96 MG: 40 INJECTION, SOLUTION INTRAMUSCULAR; INTRAVENOUS at 11:03

## 2022-03-30 NOTE — OP NOTE
Date of Procedure: 03/30/2022     Preoperative Diagnosis:  Incomplete bladder emptying, infection of sacral neuromodulator    Postoperative Diagnosis:  Incomplete bladder emptying, infection of sacral neural modulator    Primary Surgeon: KIP Shipley MD    Anesthesia:  General    Procedure Performed:  Excision of sacral neuromodulator, fluoroscopy    Estimated Blood Loss:  Less than 10 mL    Drains:  10 round Caleb drain    Specimens Removed:  Aerobic and anaerobic cultures, chronically infected sacral skin    Complications:  None    Indications: Galindo Grant is a 28-year-old male with a history of incomplete bladder emptying.  He underwent a replacement sacral neuromodulator in December 2021.  He has had wound issues intermittently since that time.  He has pain over the generator site as well as a chronically draining area from the lead implant site.  His recommended he undergo explantation of the sacral neuromodulator.  Plan to replace at a later date.    Procedure in Detail:    Galindo Grant was taken to the operating room where he was positively identified by jorge.    He was then placed under general endotracheal anesthesia and then placed in the prone position.  His pressure points were padded and he was secured to the bed.    His lower back and buttocks were then prepped and draped in usual sterile fashion.    A preoperative time-out was performed as well as confirmation of preoperative antibiotics.    I then used a fluoroscopy to perform  film.    I anesthetized the incision sites with 0.5% Marcaine plain.    I then opened the generator site with a 15 blade scalpel.  This was located the right upper buttock.    I then used electrocautery to dissect down to the generator site.  Fluid was noted which was slightly straw-colored.  Cultures were taken.    I then further opened the generator site was able to remove the generator.  The I placed gentle traction all the lead to the hold the skin  however when doing this the lead came out intact.  All 4 electrodes were noted all the tines were noted.    I have fluoroscopy take another image to confirm that all the portions of the InterStim device were removed.    I then washed the generator site with vancomycin solution.  Hemostasis was achieved using electrocautery.    I then turned my attention to the lead insertion site.  A performed an elliptical incision around this area and dissected down to the subcutaneous tissues.  The skin was then sent off for pathologic analysis.    This wound was also irrigated with vancomycin solution.  Electrocautery was used to achieve hemostasis.    I then closed the subcuticular tissues of the lead site with 3-0 Vicryl suture and a running fashion.    I turned my attention to the generator pocket.  I placed a 10 round Caleb drain into the generator site and brought the trocar out lateral to the incision.  A secure the drain to the skin with a 3-0 nylon suture.    I then closed the subcuticular tissues of the generator site with a 3-0 Vicryl suture in a running fashion.    The skin was closed at both incisions with 4-0 Monocryl suture in subcuticular fashion.  Steri-Strips were applied.  The drain was then placed to bulb suction.    Sponge counts needle counts instrument counts reported correct at the end of the case.    He was then placed in the supine position.    His anesthesia was reversed was taken to recovery room in stable condition.

## 2022-03-30 NOTE — DISCHARGE INSTRUCTIONS
Fall Prevention  Millions of people fall every year and injure themselves. You may have had anesthesia or sedation which may increase your risk of falling. You may have health issues that put you at an increased risk of falling.     Here are ways to reduce your risk of falling.    Make your home safe by keeping walkways clear of objects you may trip over.  Use non-slip pads under rugs. Do not use area rugs or small throw rugs.  Use non-slip mats in bathtubs and showers.  Install handrails and lights on staircases.  Do not walk in poorly lit areas.  Do not stand on chairs or wobbly ladders.  Use caution when reaching overhead or looking upward. This position can cause a loss of balance.  Be sure your shoes fit properly, have non-slip bottoms and are in good condition.   Wear shoes both inside and out. Avoid going barefoot or wearing slippers.  Be cautious when going up and down stairs, curbs, and when walking on uneven sidewalks.  If your balance is poor, consider using a cane or walker.  If your fall was related to alcohol use, stop or limit alcohol intake.   If your fall was related to use of sleeping medicines, talk to your doctor about this. You may need to reduce your dosage at bedtime if you awaken during the night to go to the bathroom.    To reduce the need for nighttime bathroom trips:  Avoid drinking fluids for several hours before going to bed  Empty your bladder before going to bed  Men can keep a urinal at the bedside  Stay as active as you can. Balance, flexibility, strength, and endurance all come from exercise. They all play a role in preventing falls. Ask your healthcare provider which types of activity are right for you.  Get your vision checked on a regular basis.  If you have pets, know where they are before you stand up or walk so you don't trip over them.  Use night lights.

## 2022-03-30 NOTE — PLAN OF CARE
Pt verbalized readiness to go home and understanding of discharge instructions. Dressing CDI. ARON drain secured to dressing. Sanguinous drainage.

## 2022-03-30 NOTE — DISCHARGE SUMMARY
[facility] - Surgery  Discharge Note  Short Stay    Procedure(s) (LRB):  REMOVAL, NEUROSTIMULATOR, SACRAL (N/A)    OUTCOME: Patient tolerated treatment/procedure well without complication and is now ready for discharge.    DISPOSITION: Home or Self Care    FINAL DIAGNOSIS:  Incomplete emptying of bladder    FOLLOWUP: In clinic    DISCHARGE INSTRUCTIONS:    Discharge Procedure Orders   Diet general     Call MD for:   Order Comments: Significant Pain, bleeding, or fever     Remove dressing in 48 hours        TIME SPENT ON DISCHARGE: 20 minutes    [facility]  Urology  Discharge Summary      Patient Name: Galindo Grant   MRN: [id number]  Admission Date: 03/30/2022   Hospital Length of Stay: 0 days  Discharge Date and Time:  03/30/2022 12:40 PM  Attending Physician: KIP Shipley MD   Discharging Provider: TRINH Shipley MD  Primary Care Physician: Liyah Spring      HPI: Patient was admitted for an outpatient procedure and tolerated the procedure well with no complications.     Procedures: Procedure(s):  REMOVAL, NEUROSTIMULATOR, SACRAL        Indwelling Lines/Drains at time of discharge:           Hospital Course (synopsis of major diagnoses, care, treatment, and services provided during the course of the hospital stay): Patient was admitted for an outpatient procedure and tolerated the procedure well with no complications.         Final Active Diagnoses:    Diagnosis Date Noted POA    Incomplete emptying of bladder   03/30/2022  Yes      Problems Resolved During this Admission:       Discharged Condition: stable    Disposition: Home or Self Care    Follow Up:     Patient Instructions:      Diet general     Call MD for:   Order Comments: Significant Hematuria     Medications:  Reconciled Home Medications:      Medication List      CONTINUE taking these medications    acetaminophen 500 MG tablet  Commonly known as: TYLENOL  Take 1 tablet (500 mg total) by mouth every 6 (six) hours as needed for  Pain.     atorvastatin 10 MG tablet  Commonly known as: LIPITOR  Take 1 tablet (10 mg total) by mouth once daily.     bacitracin 500 unit/gram Oint  Apply topically 2 (two) times daily.     baclofen 10 MG tablet  Commonly known as: LIORESAL  Take 1 tablet (10 mg total) by mouth 3 (three) times daily.     calcium-vitamin D3 500 mg-5 mcg (200 unit) per tablet  Commonly known as: CALCIUM 500 + D  Take 1 tablet by mouth 2 (two) times daily with meals.     cyanocobalamin 1000 MCG tablet  Commonly known as: VITAMIN B-12  Take 100 mcg by mouth 2 (two) times a day.     cyclobenzaprine 5 MG tablet  Commonly known as: FLEXERIL  Take 1 tablet (5 mg total) by mouth nightly as needed for Muscle spasms.     diclofenac sodium 1 % Gel  Commonly known as: VOLTAREN  Apply 2 g topically 4 (four) times daily.     FLUoxetine 20 MG capsule  Take 1 capsule (20 mg total) by mouth once daily.     HYDROcodone-acetaminophen 7.5-325 mg per tablet  Commonly known as: NORCO  Take 1 tablet by mouth every 6 (six) hours as needed for Pain.     hydrOXYzine HCL 25 MG tablet  Commonly known as: ATARAX  Take 1 tablet (25 mg total) by mouth 3 (three) times daily. DO NOT DRIVE AFTER TAKING MED     ibuprofen 800 MG tablet  Commonly known as: ADVIL,MOTRIN  Take 1 tablet (800 mg total) by mouth every 6 (six) hours as needed for Pain.     levETIRAcetam 500 MG Tab  Commonly known as: KEPPRA  Take 2 tablets (1,000 mg total) by mouth 2 (two) times daily.     LEVOTHYROXINE ORAL  Take by mouth.     metoprolol tartrate 25 MG tablet  Commonly known as: LOPRESSOR  Take 0.5 tablets (12.5 mg total) by mouth 2 (two) times daily.     mupirocin 2 % ointment  Commonly known as: BACTROBAN  Apply topically 3 (three) times daily.     naproxen 500 MG tablet  Commonly known as: NAPROSYN  Take 500 mg by mouth 2 (two) times daily.     nitroGLYCERIN 0.4 MG SL tablet  Commonly known as: NITROSTAT  Place 1 tablet (0.4 mg total) under the tongue every 5 (five) minutes as needed for  Chest pain (Repeat in 5 min if CP persists. Go to ER if CP worsens).     simvastatin 20 MG tablet  Commonly known as: ZOCOR  Take 20 mg by mouth every evening.     sulfamethoxazole-trimethoprim 800-160mg 800-160 mg Tab  Commonly known as: BACTRIM DS  Take 1 tablet by mouth 2 (two) times daily. for 7 days     sumatriptan 50 MG tablet  Commonly known as: IMITREX  1 tab PO x1 prn. May take another tablet after 2 hours if the headache recurs. Maximum of 4 tablets a day.     traMADoL 50 mg tablet  Commonly known as: ULTRAM  Take 1 tablet (50 mg total) by mouth every 12 (twelve) hours as needed for Pain.     vitamin D 1000 units Tab  Commonly known as: VITAMIN D3  Take 1,000 Units by mouth 2 (two) times a day.              TRINH Shipley MD  Urology  [facility]

## 2022-03-30 NOTE — BRIEF OP NOTE
[facility] - Surgery  Brief Operative Note    Surgery Date: 3/30/2022     Surgeon(s) and Role:     * KIP Shipley MD - Primary    Assisting Surgeon: None    Pre-op Diagnosis:  Incomplete emptying of bladder [R33.9]    Post-op Diagnosis:  Post-Op Diagnosis Codes:     * Incomplete emptying of bladder [R33.9]    Procedure(s) (LRB):  REMOVAL, NEUROSTIMULATOR, SACRAL (N/A)    Anesthesia: General    Operative Findings: fluid noted around generator, cultures taken.  Lead removed with generator, consistent with infection.  Chronically draining site excised.  Wounds washed.  Drain placed.     Estimated Blood Loss: * No values recorded between 3/30/2022 12:00 PM and 3/30/2022 12:39 PM *         Specimens:   Specimen (24h ago, onward)            None            Discharge Note    OUTCOME: Patient tolerated treatment/procedure well without complication and is now ready for discharge.    DISPOSITION: Home or Self Care    FINAL DIAGNOSIS:  Incomplete emptying of bladder    FOLLOWUP: In clinic    DISCHARGE INSTRUCTIONS:    Discharge Procedure Orders   Diet general     Call MD for:   Order Comments: Significant Pain, bleeding, or fever     Remove dressing in 48 hours

## 2022-03-30 NOTE — TRANSFER OF CARE
Anesthesia Transfer of Care Note    Patient: Galindo Grant    Procedure(s) Performed: Procedure(s) (LRB):  REMOVAL, NEUROSTIMULATOR, SACRAL (N/A)    Patient location: PACU    Anesthesia Type: general    Transport from OR: Transported from OR on 6-10 L/min O2 by face mask with adequate spontaneous ventilation    Post pain: adequate analgesia    Post assessment: no apparent anesthetic complications and tolerated procedure well    Post vital signs: stable    Level of consciousness: oriented, alert and awake    Nausea/Vomiting: no nausea/vomiting    Complications: none    Transfer of care protocol was followed      Last vitals:   Visit Vitals  /64 (BP Location: Right arm, Patient Position: Lying)   Pulse 74   Temp 36.6 °C (97.8 °F) (Oral)   Resp 16   SpO2 100%

## 2022-03-31 NOTE — ANESTHESIA POSTPROCEDURE EVALUATION
Anesthesia Post Evaluation    Patient: Galindo Grant    Procedure(s) Performed: Procedure(s) (LRB):  REMOVAL, NEUROSTIMULATOR, SACRAL (N/A)    Final Anesthesia Type: general      Patient location during evaluation: PACU  Patient participation: Yes- Able to Participate  Level of consciousness: awake and alert and oriented  Post-procedure vital signs: reviewed and stable  Pain management: adequate  Airway patency: patent    PONV status at discharge: No PONV  Anesthetic complications: no      Cardiovascular status: hemodynamically stable and blood pressure returned to baseline  Respiratory status: spontaneous ventilation, room air and unassisted  Hydration status: euvolemic  Follow-up not needed.          Vitals Value Taken Time   /60 03/30/22 1457   Temp 36.4 °C (97.5 °F) 03/30/22 1457   Pulse 76 03/30/22 1457   Resp 12 03/30/22 1457   SpO2 95 % 03/30/22 1457         Event Time   Out of Recovery 14:08:00         Pain/Aleks Score: Pain Rating Prior to Med Admin: 3 (3/30/2022  1:40 PM)  Aleks Score: 10 (3/30/2022  2:57 PM)

## 2022-04-01 LAB — BACTERIA SPEC AEROBE CULT: ABNORMAL

## 2022-04-03 LAB — BACTERIA SPEC ANAEROBE CULT: NORMAL

## 2022-04-04 LAB
FINAL PATHOLOGIC DIAGNOSIS: NORMAL
GROSS: NORMAL
Lab: NORMAL

## 2022-04-07 ENCOUNTER — OFFICE VISIT (OUTPATIENT)
Dept: UROLOGY | Facility: CLINIC | Age: 37
End: 2022-04-07
Payer: MEDICARE

## 2022-04-07 VITALS — BODY MASS INDEX: 27.31 KG/M2 | HEIGHT: 64 IN | WEIGHT: 159.94 LBS

## 2022-04-07 DIAGNOSIS — R33.9 INCOMPLETE EMPTYING OF BLADDER: Primary | ICD-10-CM

## 2022-04-07 PROCEDURE — 3008F PR BODY MASS INDEX (BMI) DOCUMENTED: ICD-10-PCS | Mod: CPTII,S$GLB,, | Performed by: UROLOGY

## 2022-04-07 PROCEDURE — 99999 PR PBB SHADOW E&M-EST. PATIENT-LVL III: ICD-10-PCS | Mod: PBBFAC,,, | Performed by: UROLOGY

## 2022-04-07 PROCEDURE — 1160F PR REVIEW ALL MEDS BY PRESCRIBER/CLIN PHARMACIST DOCUMENTED: ICD-10-PCS | Mod: CPTII,S$GLB,, | Performed by: UROLOGY

## 2022-04-07 PROCEDURE — 99024 PR POST-OP FOLLOW-UP VISIT: ICD-10-PCS | Mod: S$GLB,,, | Performed by: UROLOGY

## 2022-04-07 PROCEDURE — 1160F RVW MEDS BY RX/DR IN RCRD: CPT | Mod: CPTII,S$GLB,, | Performed by: UROLOGY

## 2022-04-07 PROCEDURE — 1159F PR MEDICATION LIST DOCUMENTED IN MEDICAL RECORD: ICD-10-PCS | Mod: CPTII,S$GLB,, | Performed by: UROLOGY

## 2022-04-07 PROCEDURE — 99999 PR PBB SHADOW E&M-EST. PATIENT-LVL III: CPT | Mod: PBBFAC,,, | Performed by: UROLOGY

## 2022-04-07 PROCEDURE — 1159F MED LIST DOCD IN RCRD: CPT | Mod: CPTII,S$GLB,, | Performed by: UROLOGY

## 2022-04-07 PROCEDURE — 3008F BODY MASS INDEX DOCD: CPT | Mod: CPTII,S$GLB,, | Performed by: UROLOGY

## 2022-04-07 PROCEDURE — 99024 POSTOP FOLLOW-UP VISIT: CPT | Mod: S$GLB,,, | Performed by: UROLOGY

## 2022-04-07 NOTE — PROGRESS NOTES
Subjective:       Patient ID: Galindo Grant is a 36 y.o. male who was last seen in this office 3/28/2022    Chief Complaint:   Chief Complaint   Patient presents with    Follow-up       Post-Operative Follow-up  Patient here for post-op follow-up. Patient is 1 week status post removal of Interstim on 3/30/2022.   The patient reports no problems with eating, bowel movements, voiding, or their wound. The patient is not having any pain.     His drain has put out every little.  He denies pain or fever or swelling.  He is voiding well.    ACTIVE MEDICAL ISSUES:  Patient Active Problem List   Diagnosis    Neurofibromatosis, type 1 (von Recklinghausen's disease)    Other diseases of respiratory system, not elsewhere classified    Other symptoms involving respiratory system and chest    Systemic inflammatory response syndrome due to non-infectious process without acute organ dysfunction    Low back pain    Dysphagia, oropharyngeal    Chest pain, atypical    CHEEK (dyspnea on exertion)    Incomplete emptying of bladder       ALLERGIES AND MEDICATIONS: updated and reviewed.  Review of patient's allergies indicates:  No Known Allergies  Current Outpatient Medications   Medication Sig    acetaminophen (TYLENOL) 500 MG tablet Take 1 tablet (500 mg total) by mouth every 6 (six) hours as needed for Pain.    atorvastatin (LIPITOR) 10 MG tablet Take 1 tablet (10 mg total) by mouth once daily.    bacitracin 500 unit/gram Oint Apply topically 2 (two) times daily.    baclofen (LIORESAL) 10 MG tablet Take 1 tablet (10 mg total) by mouth 3 (three) times daily.    calcium-vitamin D3 (CALCIUM 500 + D) 500 mg(1,250mg) -200 unit per tablet Take 1 tablet by mouth 2 (two) times daily with meals.    cyanocobalamin (VITAMIN B-12) 1000 MCG tablet Take 100 mcg by mouth 2 (two) times a day.    cyclobenzaprine (FLEXERIL) 5 MG tablet Take 1 tablet (5 mg total) by mouth nightly as needed for Muscle spasms.    diclofenac sodium  (VOLTAREN) 1 % Gel Apply 2 g topically 4 (four) times daily.    FLUoxetine 20 MG capsule Take 1 capsule (20 mg total) by mouth once daily.    HYDROcodone-acetaminophen (NORCO) 7.5-325 mg per tablet Take 1 tablet by mouth every 6 (six) hours as needed for Pain.    hydrOXYzine HCl (ATARAX) 25 MG tablet Take 1 tablet (25 mg total) by mouth 3 (three) times daily. DO NOT DRIVE AFTER TAKING MED    ibuprofen (ADVIL,MOTRIN) 800 MG tablet Take 1 tablet (800 mg total) by mouth every 6 (six) hours as needed for Pain.    levETIRAcetam (KEPPRA) 500 MG Tab Take 2 tablets (1,000 mg total) by mouth 2 (two) times daily.    levothyroxine sodium (LEVOTHYROXINE ORAL) Take by mouth.    metoprolol tartrate (LOPRESSOR) 25 MG tablet Take 0.5 tablets (12.5 mg total) by mouth 2 (two) times daily.    mupirocin (BACTROBAN) 2 % ointment Apply topically 3 (three) times daily.    naproxen (NAPROSYN) 500 MG tablet Take 500 mg by mouth 2 (two) times daily.    nitroGLYCERIN (NITROSTAT) 0.4 MG SL tablet Place 1 tablet (0.4 mg total) under the tongue every 5 (five) minutes as needed for Chest pain (Repeat in 5 min if CP persists. Go to ER if CP worsens).    simvastatin (ZOCOR) 20 MG tablet Take 20 mg by mouth every evening.    sumatriptan (IMITREX) 50 MG tablet 1 tab PO x1 prn. May take another tablet after 2 hours if the headache recurs. Maximum of 4 tablets a day.    tramadol (ULTRAM) 50 mg tablet Take 1 tablet (50 mg total) by mouth every 12 (twelve) hours as needed for Pain.    vitamin D (VITAMIN D3) 1000 units Tab Take 1,000 Units by mouth 2 (two) times a day.     No current facility-administered medications for this visit.       Review of Systems   Constitutional: Negative for activity change, fatigue, fever and unexpected weight change.   HENT: Negative for congestion.    Eyes: Negative for redness.   Respiratory: Negative for chest tightness and shortness of breath.    Cardiovascular: Negative for chest pain and leg swelling.  "  Gastrointestinal: Negative for abdominal pain, constipation, diarrhea, nausea and vomiting.   Genitourinary: Negative for dysuria, flank pain, frequency, hematuria, penile pain, penile swelling, scrotal swelling, testicular pain and urgency.   Musculoskeletal: Negative for arthralgias and back pain.   Neurological: Negative for dizziness and light-headedness.   Psychiatric/Behavioral: Negative for behavioral problems and confusion. The patient is not nervous/anxious.    All other systems reviewed and are negative.      Objective:      Vitals:    04/07/22 1050   Weight: 72.6 kg (159 lb 15.1 oz)   Height: 5' 4" (1.626 m)     Physical Exam  Vitals and nursing note reviewed.   Constitutional:       Appearance: He is well-developed.   HENT:      Head: Normocephalic.   Eyes:      Conjunctiva/sclera: Conjunctivae normal.   Neck:      Thyroid: No thyromegaly.      Trachea: No tracheal deviation.   Cardiovascular:      Rate and Rhythm: Normal rate.      Heart sounds: Normal heart sounds.   Pulmonary:      Effort: Pulmonary effort is normal. No respiratory distress.      Breath sounds: Normal breath sounds. No wheezing.   Abdominal:      General: Bowel sounds are normal.      Palpations: Abdomen is soft.      Tenderness: There is no abdominal tenderness. There is no rebound.      Hernia: No hernia is present.   Musculoskeletal:         General: No tenderness. Normal range of motion.      Cervical back: Normal range of motion and neck supple.      Comments: Incisions closed and clean.  No erythema.  ARON with serosanguinous fluid, removed intact.   Lymphadenopathy:      Cervical: No cervical adenopathy.   Skin:     General: Skin is warm and dry.      Findings: No erythema or rash.   Neurological:      Mental Status: He is alert and oriented to person, place, and time.   Psychiatric:         Behavior: Behavior normal.         Thought Content: Thought content normal.         Judgment: Judgment normal.         Urine dipstick " shows negative for all components.  Micro exam: negative for WBC's or RBC's.    Assessment:       1. Incomplete emptying of bladder          Plan:       1. Incomplete emptying of bladder  Doing well  Wound check in 3 weeks  ZULEIMA in several weeks to see if he needs a replacement            Follow up in about 3 weeks (around 4/28/2022) for Bladder Scan.

## 2022-05-09 ENCOUNTER — PATIENT MESSAGE (OUTPATIENT)
Dept: NEUROLOGY | Facility: CLINIC | Age: 37
End: 2022-05-09
Payer: MEDICARE

## 2022-06-08 ENCOUNTER — OFFICE VISIT (OUTPATIENT)
Dept: PULMONOLOGY | Facility: CLINIC | Age: 37
End: 2022-06-08
Payer: MEDICARE

## 2022-06-08 VITALS
SYSTOLIC BLOOD PRESSURE: 108 MMHG | HEART RATE: 99 BPM | WEIGHT: 160.25 LBS | HEIGHT: 64 IN | BODY MASS INDEX: 27.36 KG/M2 | OXYGEN SATURATION: 96 % | DIASTOLIC BLOOD PRESSURE: 73 MMHG

## 2022-06-08 DIAGNOSIS — M54.41 CHRONIC BILATERAL LOW BACK PAIN WITH BILATERAL SCIATICA: Primary | ICD-10-CM

## 2022-06-08 DIAGNOSIS — D75.1 ERYTHROCYTOSIS: ICD-10-CM

## 2022-06-08 DIAGNOSIS — G47.01 INSOMNIA DUE TO MEDICAL CONDITION: ICD-10-CM

## 2022-06-08 DIAGNOSIS — M54.42 CHRONIC BILATERAL LOW BACK PAIN WITH BILATERAL SCIATICA: Primary | ICD-10-CM

## 2022-06-08 DIAGNOSIS — G47.9 SLEEP DISTURBANCES: ICD-10-CM

## 2022-06-08 DIAGNOSIS — R91.1 PULMONARY NODULE: ICD-10-CM

## 2022-06-08 DIAGNOSIS — G89.29 CHRONIC BILATERAL LOW BACK PAIN WITH BILATERAL SCIATICA: Primary | ICD-10-CM

## 2022-06-08 DIAGNOSIS — G47.33 OSA (OBSTRUCTIVE SLEEP APNEA): ICD-10-CM

## 2022-06-08 PROCEDURE — 1159F PR MEDICATION LIST DOCUMENTED IN MEDICAL RECORD: ICD-10-PCS | Mod: CPTII,S$GLB,, | Performed by: INTERNAL MEDICINE

## 2022-06-08 PROCEDURE — 3078F PR MOST RECENT DIASTOLIC BLOOD PRESSURE < 80 MM HG: ICD-10-PCS | Mod: CPTII,S$GLB,, | Performed by: INTERNAL MEDICINE

## 2022-06-08 PROCEDURE — 3078F DIAST BP <80 MM HG: CPT | Mod: CPTII,S$GLB,, | Performed by: INTERNAL MEDICINE

## 2022-06-08 PROCEDURE — 3074F SYST BP LT 130 MM HG: CPT | Mod: CPTII,S$GLB,, | Performed by: INTERNAL MEDICINE

## 2022-06-08 PROCEDURE — 99204 PR OFFICE/OUTPT VISIT, NEW, LEVL IV, 45-59 MIN: ICD-10-PCS | Mod: S$GLB,,, | Performed by: INTERNAL MEDICINE

## 2022-06-08 PROCEDURE — 99204 OFFICE O/P NEW MOD 45 MIN: CPT | Mod: S$GLB,,, | Performed by: INTERNAL MEDICINE

## 2022-06-08 PROCEDURE — 3008F PR BODY MASS INDEX (BMI) DOCUMENTED: ICD-10-PCS | Mod: CPTII,S$GLB,, | Performed by: INTERNAL MEDICINE

## 2022-06-08 PROCEDURE — 3008F BODY MASS INDEX DOCD: CPT | Mod: CPTII,S$GLB,, | Performed by: INTERNAL MEDICINE

## 2022-06-08 PROCEDURE — 99999 PR PBB SHADOW E&M-EST. PATIENT-LVL V: CPT | Mod: PBBFAC,,, | Performed by: INTERNAL MEDICINE

## 2022-06-08 PROCEDURE — 99999 PR PBB SHADOW E&M-EST. PATIENT-LVL V: ICD-10-PCS | Mod: PBBFAC,,, | Performed by: INTERNAL MEDICINE

## 2022-06-08 PROCEDURE — 1159F MED LIST DOCD IN RCRD: CPT | Mod: CPTII,S$GLB,, | Performed by: INTERNAL MEDICINE

## 2022-06-08 PROCEDURE — 3074F PR MOST RECENT SYSTOLIC BLOOD PRESSURE < 130 MM HG: ICD-10-PCS | Mod: CPTII,S$GLB,, | Performed by: INTERNAL MEDICINE

## 2022-06-08 RX ORDER — ALBUTEROL SULFATE 90 UG/1
AEROSOL, METERED RESPIRATORY (INHALATION)
COMMUNITY
Start: 2022-05-29

## 2022-06-08 NOTE — PROGRESS NOTES
Galindo Grant  was seen as a new patient at the request of  Marie De La Cruz MD for the evaluation of sleep.    CHIEF COMPLAINT:    Chief Complaint   Patient presents with    sleep disorders       HISTORY OF PRESENT ILLNESS: Galindo rGant is a 36 y.o. male is here for sleep evaluation.   Patient was seen by Dr. De La Cruz for elevated hemoglobin and hematocrit.  Patient was referred to our clinic to assess for underlying sleep/pulmonary disorder that could contributed to erythrocytosis.  Patient with remote smoking history.  1-2 cigarette per day as a teenager for a few months.  activities is limited by pain a/w scoliosis.  Patient was seen by Dr. Ball at [facility] for MAGALIE nodule.  Per Dr. Ball, magalie nodule was unchanged from 8/2020 to 2/2021.      Patient denied snoring.  No witnessed apnea.  Tire upon awake daily.  No parasomnia.  No cataplexy.  Sleep is difficult due to pain back and leg pain.      Institute Sleepiness Scale score during initial sleep evaluation was 12.    SLEEP ROUTINE:  Activity the hour prior to sleep: watch tv     Bed partner:  Wife   Time to bed:  12-1 am   Lights off:  off  Sleep onset latency:  Several hours         Disruptions or awakenings:    Frequent awakening    Wakeup time:      1-2 pm   Perceived sleep quality:  tire       Daytime naps:      Occasional napping during the day  Weekend sleep routine:      same  Caffeine use: 1 cup per day  exercise habit:   none      PAST MEDICAL HISTORY:    Active Ambulatory Problems     Diagnosis Date Noted    Neurofibromatosis, type 1 (von Recklinghausen's disease) 07/09/2012    Other diseases of respiratory system, not elsewhere classified 06/16/2012    Other symptoms involving respiratory system and chest 06/16/2012    Systemic inflammatory response syndrome due to non-infectious process without acute organ dysfunction 06/16/2012    Low back pain 07/09/2012    Dysphagia, oropharyngeal 04/01/2016    Chest pain, atypical 12/02/2021    CHEEK  (dyspnea on exertion) 12/02/2021    Incomplete emptying of bladder 12/10/2021    PATRICIA (obstructive sleep apnea) 06/08/2022    Pulmonary nodule 06/09/2022    Insomnia 06/09/2022    Erythrocytosis 06/09/2022     Resolved Ambulatory Problems     Diagnosis Date Noted    Benign neoplasm of larynx 06/18/2012    NF (neurofibromatosis) 1983    Sleep disturbances 06/09/2022     Past Medical History:   Diagnosis Date    Arthritis     Headache(784.0)     Irregular heart beat     Mass of larynx 10/2/2012    Neurofibromatosis syndrome                 PAST SURGICAL HISTORY:    Past Surgical History:   Procedure Laterality Date    ARM DEBRIDEMENT      NF - right    partial supraglottic laryngectomy  6/15/2012    REMOVAL OF SACRAL NEUROSTIMULATOR DEVICE N/A 3/30/2022    Procedure: REMOVAL, NEUROSTIMULATOR, SACRAL;  Surgeon: KIP Shipley MD;  Location: [facility] OR;  Service: Urology;  Laterality: N/A;  FLUORO NEEDED  RN Pre OP, Covid screen 3-29-22.  C A    REPLACEMENT OF SACRAL NERVE STIMULATOR N/A 12/10/2021    Procedure: REPLACEMENT, NEUROSTIMULATOR, SACRAL;  Surgeon: KIP Shipley MD;  Location: [facility] OR;  Service: Urology;  Laterality: N/A;  1jiajie  [facility] SARAH [phone] WILL BE HERE FOR THE CASE PER HANANE ON 12-1-2021 WILL BE HERE  RN Pre Op 11-23-21.  ---COVID NEGATIVE ON 12/8----- C A  IMPLANTS ORDERED ON 12-1-2021         FAMILY HISTORY:                Family History   Problem Relation Age of Onset    Cancer Father         neuro fibroma mitosis    Cancer Paternal Uncle         neuro fibroma mitosis    Cancer Cousin         neuro fibroma mitosis       SOCIAL HISTORY:          Tobacco:   Social History     Tobacco Use   Smoking Status Former Smoker   Smokeless Tobacco Former User       alcohol use:    Social History     Substance and Sexual Activity   Alcohol Use No                 Occupation:  disable    ALLERGIES:  Review of patient's allergies indicates:  No Known Allergies    CURRENT  MEDICATIONS:    Current Outpatient Medications   Medication Sig Dispense Refill    acetaminophen (TYLENOL) 500 MG tablet Take 1 tablet (500 mg total) by mouth every 6 (six) hours as needed for Pain. 30 tablet 0    albuterol (PROVENTIL/VENTOLIN HFA) 90 mcg/actuation inhaler Inhale into the lungs.      atorvastatin (LIPITOR) 10 MG tablet Take 1 tablet (10 mg total) by mouth once daily. 90 tablet 1    bacitracin 500 unit/gram Oint Apply topically 2 (two) times daily. 30 g 0    baclofen (LIORESAL) 10 MG tablet Take 1 tablet (10 mg total) by mouth 3 (three) times daily. 90 tablet 11    calcium-vitamin D3 (CALCIUM 500 + D) 500 mg(1,250mg) -200 unit per tablet Take 1 tablet by mouth 2 (two) times daily with meals. 180 tablet 3    cyanocobalamin (VITAMIN B-12) 1000 MCG tablet Take 100 mcg by mouth 2 (two) times a day.      cyclobenzaprine (FLEXERIL) 5 MG tablet Take 1 tablet (5 mg total) by mouth nightly as needed for Muscle spasms. 90 tablet 1    FLUoxetine 20 MG capsule Take 1 capsule (20 mg total) by mouth once daily. 90 capsule 3    HYDROcodone-acetaminophen (NORCO) 7.5-325 mg per tablet Take 1 tablet by mouth every 6 (six) hours as needed for Pain. 28 tablet 0    hydrOXYzine HCl (ATARAX) 25 MG tablet Take 1 tablet (25 mg total) by mouth 3 (three) times daily. DO NOT DRIVE AFTER TAKING MED 90 tablet 5    ibuprofen (ADVIL,MOTRIN) 800 MG tablet Take 1 tablet (800 mg total) by mouth every 6 (six) hours as needed for Pain. 20 tablet 0    levETIRAcetam (KEPPRA) 500 MG Tab Take 2 tablets (1,000 mg total) by mouth 2 (two) times daily. 360 tablet 3    levothyroxine sodium (LEVOTHYROXINE ORAL) Take by mouth.      metoprolol tartrate (LOPRESSOR) 25 MG tablet Take 0.5 tablets (12.5 mg total) by mouth 2 (two) times daily. 90 tablet 1    mupirocin (BACTROBAN) 2 % ointment Apply topically 3 (three) times daily. 30 g 0    naproxen (NAPROSYN) 500 MG tablet Take 500 mg by mouth 2 (two) times daily.      nitroGLYCERIN  "(NITROSTAT) 0.4 MG SL tablet Place 1 tablet (0.4 mg total) under the tongue every 5 (five) minutes as needed for Chest pain (Repeat in 5 min if CP persists. Go to ER if CP worsens). 30 tablet 2    simvastatin (ZOCOR) 20 MG tablet Take 20 mg by mouth every evening.      sumatriptan (IMITREX) 50 MG tablet 1 tab PO x1 prn. May take another tablet after 2 hours if the headache recurs. Maximum of 4 tablets a day. 12 tablet 1    tramadol (ULTRAM) 50 mg tablet Take 1 tablet (50 mg total) by mouth every 12 (twelve) hours as needed for Pain. 60 tablet 1    vitamin D (VITAMIN D3) 1000 units Tab Take 1,000 Units by mouth 2 (two) times a day.      diclofenac sodium (VOLTAREN) 1 % Gel Apply 2 g topically 4 (four) times daily. 100 g 5     No current facility-administered medications for this visit.                  REVIEW OF SYSTEMS:     Sleep related symptoms as per HPI.  CONST:Denies weight gain    HEENT: Denies sinus congestion  PULM: Denies dyspnea  CARD:  Denies palpitations   GI:  Denies acid reflux  : Denies polyuria  NEURO: Denies headaches  PSYCH: anxious and depressed.    HEME: Denies anemia   Otherwise, a balance of systems reviewed is negative.          PHYSICAL EXAM:  Vitals:    06/08/22 0920   BP: 108/73   Pulse: 99   SpO2: 96%   Weight: 72.7 kg (160 lb 4.4 oz)   Height: 5' 4" (1.626 m)   PainSc:   4     Body mass index is 27.51 kg/m².     GENERAL: Normal development, well groomed  HEENT:  Conjunctivae are non-erythematous; Pupils equal, round, and reactive to light; Nose is symmetrical; Nasal mucosa is pink and moist; Septum is midline; Inferior turbinates are normal; Nasal airflow is normal; Posterior pharynx is pink; Modified Mallampati: 4; Posterior palate is normal; Tonsils +1; Uvula is normal and pink;Tongue is normal; Dentition is fair; No TMJ tenderness; Jaw opening and protrusion without click and without discomfort.  NECK: Supple. Neck circumference is 16 inches. No thyromegaly. No palpable nodes.   "   SKIN: On face and neck: No abrasions, no rashes, no lesions.  No subcutaneous nodules are palpable.  RESPIRATORY: Chest is clear to auscultation.  Normal chest expansion and non-labored breathing at rest.  CARDIOVASCULAR: Normal S1, S2.  No murmurs, gallops or rubs. No carotid bruits bilaterally.  EXTREMITIES: No edema. No clubbing. No cyanosis. Station normal. Gait normal.        NEURO/PSYCH: Oriented to time, place and person. Normal attention span and concentration. Affect is full. Mood is normal.                                              DATA no prior sleep study    cxr (personally reviewed) 2/23/22 no consolidation or effusion    Lab Results   Component Value Date    TSH 1.572 07/22/2020     ASSESSMENT/PLAN  Problem List Items Addressed This Visit     Erythrocytosis    Overview     followed by Dr. De La Cruz.  Last blood work wnl.  99% on room air.  No evidence of parenchymal lung disease.            Insomnia    Overview     difficulty with sleep initiation and maintenance.  May relate to poorly control pain +/- untreated karthikeyan.  Will follow with pain manangement.             Low back pain - Primary    Overview     baclofen, naproxen and flexeril.  Will refer to pain clinic           Relevant Orders    Ambulatory referral/consult to Pain Clinic    KARTHIKEYAN (obstructive sleep apnea)    Overview     The patient symptomatically has restless sleep, daytime somnolence with findings of htn, high grade mallampatti. This warrants further investigation for possible obstructive sleep apnea.  Patient will be contacted after sleep study is done.   Chronic pain meds warrant in lab evaluation.             Relevant Orders    Polysomnogram (CPAP will be added if patient meets diagnostic criteria.)    Pulmonary nodule    Overview     followed by Dr. Ball at [facility] for magalie nodule.  CT was originally done at [facility].  Images were not available for viewing. Will refer to ct of chest.  Advise patient to get ct images from dis for  comparison.             Relevant Orders    CT Chest Without Contrast    RESOLVED: Sleep disturbances            Diagnostic: Polysomnogram. The nature of this procedure and its indication was discussed with the patient.     Education: During our discussion today, we talked about the etiology of obstructive sleep apnea as well as the potential ramifications of untreated sleep apnea, which could include daytime sleepiness, hypertension, heart disease and/or stroke.     Precautions: The patient was advised to abstain from driving should they feel sleepy or drowsy.       Thank you for allowing me the opportunity to participate in the care of your patient.    Patient will No follow-ups on file. with md/np.    Please cc note to  Marie De La Cruz MD.    45 minutes of total time spent on the encounter, which includes face to face time and non-face to face time preparing to see the patient (eg, review of tests), Obtaining and/or reviewing separately obtained history, documenting clinical information in the electronic or other health record, independently interpreting results (not separately reported) and communicating results to the patient/family/caregiver, or Care coordination (not separately reported).

## 2022-06-09 PROBLEM — G47.00 INSOMNIA: Status: ACTIVE | Noted: 2022-06-09

## 2022-06-09 PROBLEM — G47.9 SLEEP DISTURBANCES: Status: ACTIVE | Noted: 2022-06-09

## 2022-06-09 PROBLEM — D75.1 ERYTHROCYTOSIS: Status: ACTIVE | Noted: 2022-06-09

## 2022-06-09 PROBLEM — G47.9 SLEEP DISTURBANCES: Status: RESOLVED | Noted: 2022-06-09 | Resolved: 2022-06-09

## 2022-06-09 PROBLEM — R91.1 PULMONARY NODULE: Status: ACTIVE | Noted: 2022-06-09

## 2022-06-13 ENCOUNTER — HOSPITAL ENCOUNTER (OUTPATIENT)
Dept: RADIOLOGY | Facility: HOSPITAL | Age: 37
Discharge: HOME | End: 2022-06-13
Attending: INTERNAL MEDICINE
Payer: MEDICARE

## 2022-06-13 DIAGNOSIS — R91.1 PULMONARY NODULE: ICD-10-CM

## 2022-06-13 PROCEDURE — 71250 CT THORAX DX C-: CPT | Mod: TC

## 2022-06-13 PROCEDURE — 71250 CT CHEST WITHOUT CONTRAST: ICD-10-PCS | Mod: 26,,, | Performed by: RADIOLOGY

## 2022-06-13 PROCEDURE — 71250 CT THORAX DX C-: CPT | Mod: 26,,, | Performed by: RADIOLOGY

## 2022-06-17 ENCOUNTER — OFFICE VISIT (OUTPATIENT)
Dept: UROLOGY | Facility: CLINIC | Age: 37
End: 2022-06-17
Payer: MEDICARE

## 2022-06-17 VITALS — HEIGHT: 64 IN | WEIGHT: 157.06 LBS | RESPIRATION RATE: 16 BRPM | BODY MASS INDEX: 26.81 KG/M2

## 2022-06-17 DIAGNOSIS — R33.9 INCOMPLETE EMPTYING OF BLADDER: Primary | ICD-10-CM

## 2022-06-17 PROCEDURE — 1160F RVW MEDS BY RX/DR IN RCRD: CPT | Mod: CPTII,S$GLB,, | Performed by: UROLOGY

## 2022-06-17 PROCEDURE — 3008F PR BODY MASS INDEX (BMI) DOCUMENTED: ICD-10-PCS | Mod: CPTII,S$GLB,, | Performed by: UROLOGY

## 2022-06-17 PROCEDURE — 99999 PR PBB SHADOW E&M-EST. PATIENT-LVL III: ICD-10-PCS | Mod: PBBFAC,,, | Performed by: UROLOGY

## 2022-06-17 PROCEDURE — 99024 POSTOP FOLLOW-UP VISIT: CPT | Mod: S$GLB,,, | Performed by: UROLOGY

## 2022-06-17 PROCEDURE — 1159F PR MEDICATION LIST DOCUMENTED IN MEDICAL RECORD: ICD-10-PCS | Mod: CPTII,S$GLB,, | Performed by: UROLOGY

## 2022-06-17 PROCEDURE — 1159F MED LIST DOCD IN RCRD: CPT | Mod: CPTII,S$GLB,, | Performed by: UROLOGY

## 2022-06-17 PROCEDURE — 99999 PR PBB SHADOW E&M-EST. PATIENT-LVL III: CPT | Mod: PBBFAC,,, | Performed by: UROLOGY

## 2022-06-17 PROCEDURE — 99024 PR POST-OP FOLLOW-UP VISIT: ICD-10-PCS | Mod: S$GLB,,, | Performed by: UROLOGY

## 2022-06-17 PROCEDURE — 3008F BODY MASS INDEX DOCD: CPT | Mod: CPTII,S$GLB,, | Performed by: UROLOGY

## 2022-06-17 PROCEDURE — 1160F PR REVIEW ALL MEDS BY PRESCRIBER/CLIN PHARMACIST DOCUMENTED: ICD-10-PCS | Mod: CPTII,S$GLB,, | Performed by: UROLOGY

## 2022-06-17 NOTE — PROGRESS NOTES
Subjective:       Patient ID: Galindo Garnt is a 36 y.o. male  The patient's last visit with me was on 4/7/2022.     Chief Complaint:   Chief Complaint   Patient presents with    Follow-up       Post-Operative Follow-up  Patient here for post-op follow-up. Patient is 1 week status post removal of Interstim on 3/30/2022.   The patient reports no problems with eating, bowel movements, voiding, or their wound. The patient is not having any pain.     His drain has put out every little.  He denies pain or fever or swelling.  He is voiding well.    06/17/2022  He is back for a bladder scan.  He has no complaints.  He is not sure if he is emptying.   He has a difficult time feeling if he is full.    IPSS Questionnaire (AUA-7):  Over the past month    1)  How often have you had a sensation of not emptying your bladder completely after you finish urinating?  0 - Not at all   2)  How often have you had to urinate again less than two hours after you finished urinating? 0 - Not at all   3)  How often have you found you stopped and started again several times when you urinated?  0 - Not at all   4) How difficult have you found it to postpone urination?  0 - Not at all   5) How often have you had a weak urinary stream?  0 - Not at all   6) How often have you had to push or strain to begin urination?  0 - Not at all   7) How many times did you most typically get up to urinate from the time you went to bed until the time you got up in the morning?  2 - 2 times   Total score:  0-7 mildly symptomatic    8-19 moderately symptomatic    20-35 severely symptomatic        ACTIVE MEDICAL ISSUES:  Patient Active Problem List   Diagnosis    Neurofibromatosis, type 1 (von Recklinghausen's disease)    Other diseases of respiratory system, not elsewhere classified    Other symptoms involving respiratory system and chest    Systemic inflammatory response syndrome due to non-infectious process without acute organ dysfunction    Low back  pain    Dysphagia, oropharyngeal    Chest pain, atypical    CHEEK (dyspnea on exertion)    Incomplete emptying of bladder    PATRICIA (obstructive sleep apnea)    Pulmonary nodule    Insomnia    Erythrocytosis       ALLERGIES AND MEDICATIONS: updated and reviewed.  Review of patient's allergies indicates:  No Known Allergies  Current Outpatient Medications   Medication Sig    acetaminophen (TYLENOL) 500 MG tablet Take 1 tablet (500 mg total) by mouth every 6 (six) hours as needed for Pain.    albuterol (PROVENTIL/VENTOLIN HFA) 90 mcg/actuation inhaler Inhale into the lungs.    atorvastatin (LIPITOR) 10 MG tablet Take 1 tablet (10 mg total) by mouth once daily.    bacitracin 500 unit/gram Oint Apply topically 2 (two) times daily.    baclofen (LIORESAL) 10 MG tablet Take 1 tablet (10 mg total) by mouth 3 (three) times daily.    calcium-vitamin D3 (CALCIUM 500 + D) 500 mg(1,250mg) -200 unit per tablet Take 1 tablet by mouth 2 (two) times daily with meals.    cyanocobalamin (VITAMIN B-12) 1000 MCG tablet Take 100 mcg by mouth 2 (two) times a day.    cyclobenzaprine (FLEXERIL) 5 MG tablet Take 1 tablet (5 mg total) by mouth nightly as needed for Muscle spasms.    FLUoxetine 20 MG capsule Take 1 capsule (20 mg total) by mouth once daily.    HYDROcodone-acetaminophen (NORCO) 7.5-325 mg per tablet Take 1 tablet by mouth every 6 (six) hours as needed for Pain.    hydrOXYzine HCl (ATARAX) 25 MG tablet Take 1 tablet (25 mg total) by mouth 3 (three) times daily. DO NOT DRIVE AFTER TAKING MED    ibuprofen (ADVIL,MOTRIN) 800 MG tablet Take 1 tablet (800 mg total) by mouth every 6 (six) hours as needed for Pain.    levETIRAcetam (KEPPRA) 500 MG Tab Take 2 tablets (1,000 mg total) by mouth 2 (two) times daily.    levothyroxine sodium (LEVOTHYROXINE ORAL) Take by mouth.    metoprolol tartrate (LOPRESSOR) 25 MG tablet Take 0.5 tablets (12.5 mg total) by mouth 2 (two) times daily.    mupirocin (BACTROBAN) 2 % ointment  "Apply topically 3 (three) times daily.    naproxen (NAPROSYN) 500 MG tablet Take 500 mg by mouth 2 (two) times daily.    nitroGLYCERIN (NITROSTAT) 0.4 MG SL tablet Place 1 tablet (0.4 mg total) under the tongue every 5 (five) minutes as needed for Chest pain (Repeat in 5 min if CP persists. Go to ER if CP worsens).    simvastatin (ZOCOR) 20 MG tablet Take 20 mg by mouth every evening.    sumatriptan (IMITREX) 50 MG tablet 1 tab PO x1 prn. May take another tablet after 2 hours if the headache recurs. Maximum of 4 tablets a day.    tramadol (ULTRAM) 50 mg tablet Take 1 tablet (50 mg total) by mouth every 12 (twelve) hours as needed for Pain.    vitamin D (VITAMIN D3) 1000 units Tab Take 1,000 Units by mouth 2 (two) times a day.    diclofenac sodium (VOLTAREN) 1 % Gel Apply 2 g topically 4 (four) times daily.     No current facility-administered medications for this visit.       Review of Systems   Constitutional: Negative for activity change, fatigue, fever and unexpected weight change.   HENT: Negative for congestion.    Eyes: Negative for redness.   Respiratory: Negative for chest tightness and shortness of breath.    Cardiovascular: Negative for chest pain and leg swelling.   Gastrointestinal: Negative for abdominal pain, constipation, diarrhea, nausea and vomiting.   Genitourinary: Negative for dysuria, flank pain, frequency, hematuria, penile pain, penile swelling, scrotal swelling, testicular pain and urgency.   Musculoskeletal: Negative for arthralgias and back pain.   Neurological: Negative for dizziness and light-headedness.   Psychiatric/Behavioral: Negative for behavioral problems and confusion. The patient is not nervous/anxious.    All other systems reviewed and are negative.      Objective:      Vitals:    06/17/22 1403   Resp: 16   Weight: 71.2 kg (157 lb 1.2 oz)   Height: 5' 4" (1.626 m)     Physical Exam  Vitals and nursing note reviewed.   Constitutional:       Appearance: He is " well-developed.   HENT:      Head: Normocephalic.   Eyes:      Conjunctiva/sclera: Conjunctivae normal.   Neck:      Thyroid: No thyromegaly.      Trachea: No tracheal deviation.   Cardiovascular:      Rate and Rhythm: Normal rate.      Heart sounds: Normal heart sounds.   Pulmonary:      Effort: Pulmonary effort is normal. No respiratory distress.      Breath sounds: Normal breath sounds. No wheezing.   Abdominal:      General: Bowel sounds are normal.      Palpations: Abdomen is soft.      Tenderness: There is no abdominal tenderness. There is no rebound.      Hernia: No hernia is present.   Musculoskeletal:         General: No tenderness. Normal range of motion.      Cervical back: Normal range of motion and neck supple.      Comments: Incisions healed, no erythema, pain, or induration.   Lymphadenopathy:      Cervical: No cervical adenopathy.   Skin:     General: Skin is warm and dry.      Findings: No erythema or rash.   Neurological:      Mental Status: He is alert and oriented to person, place, and time.   Psychiatric:         Behavior: Behavior normal.         Thought Content: Thought content normal.         Judgment: Judgment normal.         Urine dipstick shows negative for all components.  Micro exam: negative for WBC's or RBC's.      Bladder scan: 20 mL PVR      Assessment:       1. Incomplete emptying of bladder          Plan:       He will return if he starts having problems.              Follow up if symptoms worsen or fail to improve.

## 2022-06-20 ENCOUNTER — HOSPITAL ENCOUNTER (OUTPATIENT)
Dept: PREADMISSION TESTING | Facility: HOSPITAL | Age: 37
Discharge: HOME | End: 2022-06-20
Attending: INTERNAL MEDICINE
Payer: MEDICARE

## 2022-06-20 DIAGNOSIS — Z11.52 ENCOUNTER FOR PREOPERATIVE SCREENING LABORATORY TESTING FOR COVID-19 VIRUS: Primary | ICD-10-CM

## 2022-06-20 DIAGNOSIS — Z01.812 ENCOUNTER FOR PREOPERATIVE SCREENING LABORATORY TESTING FOR COVID-19 VIRUS: Primary | ICD-10-CM

## 2022-06-20 LAB — SARS-COV-2 RDRP RESP QL NAA+PROBE: NEGATIVE

## 2022-06-20 PROCEDURE — U0002 COVID-19 LAB TEST NON-CDC: HCPCS | Performed by: INTERNAL MEDICINE

## 2022-06-21 ENCOUNTER — HOSPITAL ENCOUNTER (OUTPATIENT)
Dept: SLEEP MEDICINE | Facility: HOSPITAL | Age: 37
Discharge: HOME | End: 2022-06-21
Attending: INTERNAL MEDICINE
Payer: MEDICARE

## 2022-06-21 ENCOUNTER — TELEPHONE (OUTPATIENT)
Dept: SLEEP MEDICINE | Facility: HOSPITAL | Age: 37
End: 2022-06-21
Payer: MEDICARE

## 2022-06-21 DIAGNOSIS — G47.33 OSA (OBSTRUCTIVE SLEEP APNEA): ICD-10-CM

## 2022-06-21 PROCEDURE — 95810 POLYSOM 6/> YRS 4/> PARAM: CPT

## 2022-06-21 PROCEDURE — 95810 PR POLYSOMNOGRAPHY, 4 OR MORE: ICD-10-PCS | Mod: 26,,, | Performed by: INTERNAL MEDICINE

## 2022-06-21 PROCEDURE — 95810 POLYSOM 6/> YRS 4/> PARAM: CPT | Mod: 26,,, | Performed by: INTERNAL MEDICINE

## 2022-06-21 NOTE — Clinical Note
Please schedule sleep clinic appointmetnt with me in 1-2 weeks to discuss sleep study result and ct result.  Please notify me if we are not able to get patient schedule within 2 weeks.   In addition, please inform patient that he supposed to get hard copy of ct images from [facility] for me to compare with current ct.

## 2022-06-22 NOTE — PROGRESS NOTES
A diagnostic study was performed on Galindo Grant. The entire procedure was explained, including Bio calibration procedure, patient was informed that there may be a need to enter the room during the night to fix lead or make adjustments to the equipment. Questions were answered prior to start of study. He  was given instructions on how to call out for help including how to use the nurse call unit in the bathroom. Patient was given [employer] phone number to call if patient needed tech for anything, in case tech was out of tech room.  Patient education was performed. This includes the possible use of CPAP machine and different CPAP masks. He was given the after visit summary.   The lowest oxygen saturation observed during sleep was 85%     He or Her did not meet criteria for a split night study due to insufficient amount of events during the 1st part of the study.  EKG: The EKG appeared to be NSR with PVC  Soft snoring/ supine Rem was observed

## 2022-06-30 NOTE — PROCEDURES
"Dear Provider,     You have ordered sleep LAB services to perform the sleep study for Galindo Grant.  The sleep study that you ordered is complete.      Please find Sleep Study result in "Chart Review" under the "Media tab."      As the ordering provider, you are responsible for reviewing the results and implementing a treatment plan with your patient.    If you need a Sleep Medicine provider to explain the sleep study findings and arrange treatment for the patient, please refer patient for consultation to our Sleep Clinic via [facility] with Ambulatory Consult Sleep.    To do that please place an order for an  "Ambulatory Consult Sleep" - it will go to our clinic work queue for our Medical Assistant to contact the patient for an appointment.     For any questions, please contact our clinic staff at [phone] to talk to clinical staff.   "

## 2022-07-01 ENCOUNTER — TELEPHONE (OUTPATIENT)
Dept: PULMONOLOGY | Facility: CLINIC | Age: 37
End: 2022-07-01
Payer: MEDICARE

## 2022-07-01 NOTE — TELEPHONE ENCOUNTER
Sent patient a portal message.              ----- Message from Randolph Argueta MD sent at 6/30/2022  2:08 PM CDT -----  Please schedule sleep clinic appointmetnt with me in 1-2 weeks to discuss sleep study result and ct result.  Please notify me if we are not able to get patient schedule within 2 weeks.   In addition, please inform patient that he supposed to get hard copy of ct images from [facility] for me to compare with current ct.

## 2022-07-05 ENCOUNTER — PATIENT MESSAGE (OUTPATIENT)
Dept: PULMONOLOGY | Facility: CLINIC | Age: 37
End: 2022-07-05
Payer: MEDICARE

## 2022-07-13 ENCOUNTER — LAB VISIT (OUTPATIENT)
Dept: LAB | Facility: HOSPITAL | Age: 37
End: 2022-07-13
Attending: INTERNAL MEDICINE
Payer: MEDICARE

## 2022-07-13 DIAGNOSIS — D58.2 ELEVATED HEMOGLOBIN: ICD-10-CM

## 2022-07-13 LAB
BASOPHILS # BLD AUTO: 0.05 K/UL (ref 0–0.2)
BASOPHILS NFR BLD: 0.9 % (ref 0–1.9)
DIFFERENTIAL METHOD: NORMAL
EOSINOPHIL # BLD AUTO: 0.3 K/UL (ref 0–0.5)
EOSINOPHIL NFR BLD: 6.4 % (ref 0–8)
ERYTHROCYTE [DISTWIDTH] IN BLOOD BY AUTOMATED COUNT: 12.3 % (ref 11.5–14.5)
HCT VFR BLD AUTO: 48.8 % (ref 40–54)
HGB BLD-MCNC: 16.3 G/DL (ref 14–18)
IMM GRANULOCYTES # BLD AUTO: 0 K/UL (ref 0–0.04)
IMM GRANULOCYTES NFR BLD AUTO: 0 % (ref 0–0.5)
LYMPHOCYTES # BLD AUTO: 2.1 K/UL (ref 1–4.8)
LYMPHOCYTES NFR BLD: 39.3 % (ref 18–48)
MCH RBC QN AUTO: 29 PG (ref 27–31)
MCHC RBC AUTO-ENTMCNC: 33.4 G/DL (ref 32–36)
MCV RBC AUTO: 87 FL (ref 82–98)
MONOCYTES # BLD AUTO: 0.6 K/UL (ref 0.3–1)
MONOCYTES NFR BLD: 10.5 % (ref 4–15)
NEUTROPHILS # BLD AUTO: 2.3 K/UL (ref 1.8–7.7)
NEUTROPHILS NFR BLD: 42.9 % (ref 38–73)
NRBC BLD-RTO: 0 /100 WBC
PLATELET # BLD AUTO: 249 K/UL (ref 150–450)
PMV BLD AUTO: 10.1 FL (ref 9.2–12.9)
RBC # BLD AUTO: 5.63 M/UL (ref 4.6–6.2)
WBC # BLD AUTO: 5.32 K/UL (ref 3.9–12.7)

## 2022-07-13 PROCEDURE — 36415 COLL VENOUS BLD VENIPUNCTURE: CPT | Performed by: INTERNAL MEDICINE

## 2022-07-13 PROCEDURE — 82668 ASSAY OF ERYTHROPOIETIN: CPT | Performed by: INTERNAL MEDICINE

## 2022-07-13 PROCEDURE — 85025 COMPLETE CBC W/AUTO DIFF WBC: CPT | Performed by: INTERNAL MEDICINE

## 2022-07-15 ENCOUNTER — OFFICE VISIT (OUTPATIENT)
Dept: PAIN MEDICINE | Facility: CLINIC | Age: 37
End: 2022-07-15
Payer: MEDICARE

## 2022-07-15 VITALS
BODY MASS INDEX: 27.08 KG/M2 | HEIGHT: 64 IN | HEART RATE: 68 BPM | WEIGHT: 158.63 LBS | OXYGEN SATURATION: 98 % | DIASTOLIC BLOOD PRESSURE: 66 MMHG | SYSTOLIC BLOOD PRESSURE: 110 MMHG

## 2022-07-15 DIAGNOSIS — M54.42 CHRONIC BILATERAL LOW BACK PAIN WITH BILATERAL SCIATICA: ICD-10-CM

## 2022-07-15 DIAGNOSIS — M54.41 CHRONIC BILATERAL LOW BACK PAIN WITH BILATERAL SCIATICA: ICD-10-CM

## 2022-07-15 DIAGNOSIS — G89.29 CHRONIC BILATERAL LOW BACK PAIN WITH BILATERAL SCIATICA: ICD-10-CM

## 2022-07-15 LAB — EPO SERPL-ACNC: 7.7 MIU/ML (ref 2.6–18.5)

## 2022-07-15 PROCEDURE — 99203 OFFICE O/P NEW LOW 30 MIN: CPT | Mod: S$GLB,,, | Performed by: PAIN MEDICINE

## 2022-07-15 PROCEDURE — 3008F PR BODY MASS INDEX (BMI) DOCUMENTED: ICD-10-PCS | Mod: CPTII,S$GLB,, | Performed by: PAIN MEDICINE

## 2022-07-15 PROCEDURE — 99999 PR PBB SHADOW E&M-EST. PATIENT-LVL IV: ICD-10-PCS | Mod: PBBFAC,,, | Performed by: PAIN MEDICINE

## 2022-07-15 PROCEDURE — 3074F SYST BP LT 130 MM HG: CPT | Mod: CPTII,S$GLB,, | Performed by: PAIN MEDICINE

## 2022-07-15 PROCEDURE — 3078F DIAST BP <80 MM HG: CPT | Mod: CPTII,S$GLB,, | Performed by: PAIN MEDICINE

## 2022-07-15 PROCEDURE — 1160F PR REVIEW ALL MEDS BY PRESCRIBER/CLIN PHARMACIST DOCUMENTED: ICD-10-PCS | Mod: CPTII,S$GLB,, | Performed by: PAIN MEDICINE

## 2022-07-15 PROCEDURE — 3074F PR MOST RECENT SYSTOLIC BLOOD PRESSURE < 130 MM HG: ICD-10-PCS | Mod: CPTII,S$GLB,, | Performed by: PAIN MEDICINE

## 2022-07-15 PROCEDURE — 1160F RVW MEDS BY RX/DR IN RCRD: CPT | Mod: CPTII,S$GLB,, | Performed by: PAIN MEDICINE

## 2022-07-15 PROCEDURE — 99999 PR PBB SHADOW E&M-EST. PATIENT-LVL IV: CPT | Mod: PBBFAC,,, | Performed by: PAIN MEDICINE

## 2022-07-15 PROCEDURE — 3008F BODY MASS INDEX DOCD: CPT | Mod: CPTII,S$GLB,, | Performed by: PAIN MEDICINE

## 2022-07-15 PROCEDURE — 99203 PR OFFICE/OUTPT VISIT, NEW, LEVL III, 30-44 MIN: ICD-10-PCS | Mod: S$GLB,,, | Performed by: PAIN MEDICINE

## 2022-07-15 PROCEDURE — 1159F PR MEDICATION LIST DOCUMENTED IN MEDICAL RECORD: ICD-10-PCS | Mod: CPTII,S$GLB,, | Performed by: PAIN MEDICINE

## 2022-07-15 PROCEDURE — 1159F MED LIST DOCD IN RCRD: CPT | Mod: CPTII,S$GLB,, | Performed by: PAIN MEDICINE

## 2022-07-15 PROCEDURE — 3078F PR MOST RECENT DIASTOLIC BLOOD PRESSURE < 80 MM HG: ICD-10-PCS | Mod: CPTII,S$GLB,, | Performed by: PAIN MEDICINE

## 2022-07-15 NOTE — PROGRESS NOTES
Subjective:     Patient ID: Galindo Grant is a 37 y.o. male    Chief Complaint: Low-back Pain (Constant radiating pain)      Referred by: Randolph Argueta MD      HPI:    Initial Encounter (7/15/22):  Galindo Grant is a 37 y.o. male who presents today with chronic bilateral low back and lower extremity pain.  Patient has had this pain for many years.  It is located throughout the lumbar spine radiate to lower extremities in a nonspecific pattern.  Patient was told that he has scoliosis and arthritis in his spine.  He reports associated numbness and tingling.  He denies any bowel or bladder dysfunction or focal weakness.  States that he is not interested in interventional procedures at this time.   This pain is described in detail below.    Physical Therapy:  Yes.  Not effective.    Non-pharmacologic Treatment:  Nothing helps         · TENS?  No    Pain Medications:         · Currently taking:  Tylenol, baclofen, Flexeril, ibuprofen, Norco    · Has tried in the past:  Naproxen tramadol    · Has not tried:  TCAs, SNRIs, anticonvulsants, topical creams    Blood thinners:  None    Interventional Therapies:  None    Relevant Surgeries:  None    Affecting sleep?  Yes    Affecting daily activities? yes    Depressive symptoms? no          · SI/HI? No    Work status: Disabled    Pain Scores:    Best:       0/10  Worst:     10/10  Usually:   9/10  Today:    0/10    Pain Disability Index  Family/Home Responsibilities:: 7  Recreation:: 8  Social Activity:: 8  Occupation:: 0  Sexual Behavior:: 0  Self Care:: 0  Life-Support Activities:: 0  Pain Disability Index (PDI): 23    Review of Systems   Constitutional: Negative for activity change, appetite change, chills, fatigue, fever and unexpected weight change.   HENT: Negative for hearing loss.    Eyes: Negative for visual disturbance.   Respiratory: Negative for chest tightness and shortness of breath.    Cardiovascular: Negative for chest pain.   Gastrointestinal: Negative for  abdominal pain, constipation, diarrhea, nausea and vomiting.   Genitourinary: Negative for difficulty urinating.   Musculoskeletal: Positive for arthralgias, back pain, gait problem and myalgias. Negative for neck pain.   Skin: Negative for rash.   Neurological: Positive for weakness and numbness. Negative for dizziness, light-headedness and headaches.   Psychiatric/Behavioral: Positive for sleep disturbance. Negative for hallucinations and suicidal ideas. The patient is not nervous/anxious.        Past Medical History:   Diagnosis Date    Arthritis     in knee    Dysphagia, oropharyngeal 4/1/2016    Headache(784.0)     Irregular heart beat     Mass of larynx 10/2/2012    Neurofibromatosis syndrome     Neurofibromatosis, type 1 (von Recklinghausen's disease) birth    Neurofibromatosis, type 1 (von Recklinghausen's disease) 7/9/2012       Past Surgical History:   Procedure Laterality Date    ARM DEBRIDEMENT      NF - right    partial supraglottic laryngectomy  6/15/2012    REMOVAL OF SACRAL NEUROSTIMULATOR DEVICE N/A 3/30/2022    Procedure: REMOVAL, NEUROSTIMULATOR, SACRAL;  Surgeon: KIP Shipley MD;  Location: [facility] OR;  Service: Urology;  Laterality: N/A;  FLUORO NEEDED  RN Pre OP, Covid screen 3-29-22.  C A    REPLACEMENT OF SACRAL NERVE STIMULATOR N/A 12/10/2021    Procedure: REPLACEMENT, NEUROSTIMULATOR, SACRAL;  Surgeon: KIP Shipley MD;  Location: [facility] OR;  Service: Urology;  Laterality: N/A;  [employer]  [facility] SARAH [phone] WILL BE HERE FOR THE CASE PER HANANE ON 12-1-2021 WILL BE HERE  RN Pre Op 11-23-21.  ---COVID NEGATIVE ON 12/8----- C A  IMPLANTS ORDERED ON 12-1-2021       Social History     Socioeconomic History    Marital status:     Number of children: 1    Years of education: 12   Occupational History    Occupation:      Employer: OTHER   Tobacco Use    Smoking status: Former Smoker    Smokeless tobacco: Former User   Substance and Sexual Activity     Alcohol use: No    Drug use: No    Sexual activity: Yes     Partners: Female   Social History Narrative    Moved back to [facility] in 2015. Now .        Review of patient's allergies indicates:  No Known Allergies    Current Outpatient Medications on File Prior to Visit   Medication Sig Dispense Refill    acetaminophen (TYLENOL) 500 MG tablet Take 1 tablet (500 mg total) by mouth every 6 (six) hours as needed for Pain. 30 tablet 0    albuterol (PROVENTIL/VENTOLIN HFA) 90 mcg/actuation inhaler Inhale into the lungs.      atorvastatin (LIPITOR) 10 MG tablet Take 1 tablet (10 mg total) by mouth once daily. 90 tablet 1    bacitracin 500 unit/gram Oint Apply topically 2 (two) times daily. 30 g 0    baclofen (LIORESAL) 10 MG tablet Take 1 tablet (10 mg total) by mouth 3 (three) times daily. 90 tablet 11    calcium-vitamin D3 (CALCIUM 500 + D) 500 mg(1,250mg) -200 unit per tablet Take 1 tablet by mouth 2 (two) times daily with meals. 180 tablet 3    cyanocobalamin (VITAMIN B-12) 1000 MCG tablet Take 100 mcg by mouth 2 (two) times a day.      cyclobenzaprine (FLEXERIL) 5 MG tablet Take 1 tablet (5 mg total) by mouth nightly as needed for Muscle spasms. 90 tablet 1    FLUoxetine 20 MG capsule Take 1 capsule (20 mg total) by mouth once daily. 90 capsule 3    HYDROcodone-acetaminophen (NORCO) 7.5-325 mg per tablet Take 1 tablet by mouth every 6 (six) hours as needed for Pain. 28 tablet 0    hydrOXYzine HCl (ATARAX) 25 MG tablet Take 1 tablet (25 mg total) by mouth 3 (three) times daily. DO NOT DRIVE AFTER TAKING MED 90 tablet 5    ibuprofen (ADVIL,MOTRIN) 800 MG tablet Take 1 tablet (800 mg total) by mouth every 6 (six) hours as needed for Pain. 20 tablet 0    levETIRAcetam (KEPPRA) 500 MG Tab Take 2 tablets (1,000 mg total) by mouth 2 (two) times daily. 360 tablet 3    levothyroxine sodium (LEVOTHYROXINE ORAL) Take by mouth.      metoprolol tartrate (LOPRESSOR) 25 MG tablet Take 0.5 tablets (12.5 mg  "total) by mouth 2 (two) times daily. 90 tablet 1    mupirocin (BACTROBAN) 2 % ointment Apply topically 3 (three) times daily. 30 g 0    nitroGLYCERIN (NITROSTAT) 0.4 MG SL tablet Place 1 tablet (0.4 mg total) under the tongue every 5 (five) minutes as needed for Chest pain (Repeat in 5 min if CP persists. Go to ER if CP worsens). 30 tablet 2    simvastatin (ZOCOR) 20 MG tablet Take 20 mg by mouth every evening.      sumatriptan (IMITREX) 50 MG tablet 1 tab PO x1 prn. May take another tablet after 2 hours if the headache recurs. Maximum of 4 tablets a day. 12 tablet 1    vitamin D (VITAMIN D3) 1000 units Tab Take 1,000 Units by mouth 2 (two) times a day.      [DISCONTINUED] naproxen (NAPROSYN) 500 MG tablet Take 500 mg by mouth 2 (two) times daily.      [DISCONTINUED] tramadol (ULTRAM) 50 mg tablet Take 1 tablet (50 mg total) by mouth every 12 (twelve) hours as needed for Pain. 60 tablet 1    diclofenac sodium (VOLTAREN) 1 % Gel Apply 2 g topically 4 (four) times daily. 100 g 5     No current facility-administered medications on file prior to visit.       Objective:      /66 (BP Location: Right arm, Patient Position: Sitting, BP Method: Medium (Automatic))   Pulse 68   Ht 5' 4" (1.626 m)   Wt 71.9 kg (158 lb 9.6 oz)   SpO2 98%   BMI 27.22 kg/m²     Exam:  GEN:  Well developed, well nourished.  No acute distress.   HEENT:  No trauma.  Mucous membranes moist.  Nares patent bilaterally.  PSYCH: Normal affect. Thought content appropriate.  CHEST:  Breathing symmetric.  No audible wheezing.  ABD: Soft, non-distended.  SKIN:  Warm, pink, dry.  No rash on exposed areas.    EXT:  No cyanosis, clubbing, or edema.  No color change or changes in nail or hair growth.  NEURO/MUSCULOSKELETAL:  Fully alert, oriented, and appropriate. Speech normal valerie. No cranial nerve deficits.   Gait:  Antalgic.  Ambulates with cane.  No focal motor deficits.     Wearing back brace and left knee " brace      Imaging:    Narrative & Impression    EXAMINATION:  CT THORACIC SPINE W WO CONTRAST; CT LUMBAR SPINE W WO CONTRAST     CLINICAL HISTORY:  Mid-back pain;; Back pain, progressive neurologic deficit;  Pain in thoracic spine; Dorsalgia, unspecified     FINDINGS:  Comparison prior MRI of the spine 06/08/2012.  Patient was administered 100 cc of Omnipaque 350.  Intravenously.     There is a sacral stimulator.  No fracture dislocation bone destruction seen.  Vertebral body heights are normal.  Disc spaces are maintained.  Posterior elements are intact.  No disc herniation spinal canal stenosis seen.  No paraspinal masses are seen.  Spinal canal is patent and normal.  Intra-abdominal organs show nothing unusual.  No paraspinal masses are seen.  Lumbosacral plexus is unremarkable.  No nerve sheath tumors are seen.  Neural foramina are patent.     Impression:     No significant abnormality seen.  The normal CT with contrast of the thoracic and lumbar spine.        Electronically signed by: West Menjivar MD  Date:                                            10/05/2021  Time:                                           09:00         Assessment:       Encounter Diagnosis   Name Primary?    Chronic bilateral low back pain with bilateral sciatica          Plan:       Galindo HERNANDEZ was seen today for low-back pain.    Diagnoses and all orders for this visit:    Chronic bilateral low back pain with bilateral sciatica  -     Ambulatory referral/consult to Pain Clinic        Galindo Grant is a 37 y.o. male with chronic bilateral low back and lower extremity pain.  Exact etiology of pain is unclear.  No significant abnormalities on most recent lumbar CT scan.  Cannot rule out some abnormalities related to intervertebral discs or nerve roots though no overt signs of disc herniations on CT scan..    1.  Pertinent imaging studies reviewed by me. Imaging results were discussed with patient.  2.  No interventional procedures at this  time as patient is not interested.  I encouraged patient to return to clinic if he changes his mind and may consider interventional procedures.  3.  Has completed physical therapy in the past with limited relief.  4.  Return to clinic p.r.n..            This note was created by combination of typed  and M-Modal dictation. Transcription and phonetic errors may be present.  If there are any questions, please contact me.

## 2022-07-18 ENCOUNTER — OFFICE VISIT (OUTPATIENT)
Dept: PULMONOLOGY | Facility: CLINIC | Age: 37
End: 2022-07-18
Payer: MEDICARE

## 2022-07-18 VITALS
HEIGHT: 64 IN | OXYGEN SATURATION: 95 % | SYSTOLIC BLOOD PRESSURE: 120 MMHG | HEART RATE: 97 BPM | WEIGHT: 158.94 LBS | DIASTOLIC BLOOD PRESSURE: 75 MMHG | BODY MASS INDEX: 27.13 KG/M2

## 2022-07-18 DIAGNOSIS — M54.50 BILATERAL LOW BACK PAIN WITHOUT SCIATICA, UNSPECIFIED CHRONICITY: ICD-10-CM

## 2022-07-18 DIAGNOSIS — G47.33 OSA (OBSTRUCTIVE SLEEP APNEA): ICD-10-CM

## 2022-07-18 DIAGNOSIS — R91.1 PULMONARY NODULE: ICD-10-CM

## 2022-07-18 PROCEDURE — 1159F PR MEDICATION LIST DOCUMENTED IN MEDICAL RECORD: ICD-10-PCS | Mod: CPTII,S$GLB,, | Performed by: INTERNAL MEDICINE

## 2022-07-18 PROCEDURE — 1159F MED LIST DOCD IN RCRD: CPT | Mod: CPTII,S$GLB,, | Performed by: INTERNAL MEDICINE

## 2022-07-18 PROCEDURE — 3078F DIAST BP <80 MM HG: CPT | Mod: CPTII,S$GLB,, | Performed by: INTERNAL MEDICINE

## 2022-07-18 PROCEDURE — 99999 PR PBB SHADOW E&M-EST. PATIENT-LVL IV: CPT | Mod: PBBFAC,,, | Performed by: INTERNAL MEDICINE

## 2022-07-18 PROCEDURE — 3074F PR MOST RECENT SYSTOLIC BLOOD PRESSURE < 130 MM HG: ICD-10-PCS | Mod: CPTII,S$GLB,, | Performed by: INTERNAL MEDICINE

## 2022-07-18 PROCEDURE — 3074F SYST BP LT 130 MM HG: CPT | Mod: CPTII,S$GLB,, | Performed by: INTERNAL MEDICINE

## 2022-07-18 PROCEDURE — 99214 OFFICE O/P EST MOD 30 MIN: CPT | Mod: S$GLB,,, | Performed by: INTERNAL MEDICINE

## 2022-07-18 PROCEDURE — 3008F PR BODY MASS INDEX (BMI) DOCUMENTED: ICD-10-PCS | Mod: CPTII,S$GLB,, | Performed by: INTERNAL MEDICINE

## 2022-07-18 PROCEDURE — 99999 PR PBB SHADOW E&M-EST. PATIENT-LVL IV: ICD-10-PCS | Mod: PBBFAC,,, | Performed by: INTERNAL MEDICINE

## 2022-07-18 PROCEDURE — 99214 PR OFFICE/OUTPT VISIT, EST, LEVL IV, 30-39 MIN: ICD-10-PCS | Mod: S$GLB,,, | Performed by: INTERNAL MEDICINE

## 2022-07-18 PROCEDURE — 3078F PR MOST RECENT DIASTOLIC BLOOD PRESSURE < 80 MM HG: ICD-10-PCS | Mod: CPTII,S$GLB,, | Performed by: INTERNAL MEDICINE

## 2022-07-18 PROCEDURE — 3008F BODY MASS INDEX DOCD: CPT | Mod: CPTII,S$GLB,, | Performed by: INTERNAL MEDICINE

## 2022-07-18 NOTE — PROGRESS NOTES
Galindo Grant  was seen as a follow up.      CHIEF COMPLAINT:    Chief Complaint   Patient presents with    Results       HISTORY OF PRESENT ILLNESS: Galindo Grant is a 37 y.o. male is here for sleep evaluation.   Patient was seen by Dr. De La Cruz for elevated hemoglobin and hematocrit.  Patient was referred to our clinic to assess for underlying sleep/pulmonary disorder that could contributed to erythrocytosis.  Patient with remote smoking history.  1-2 cigarette per day as a teenager for a few months.  activities is limited by pain a/w scoliosis.  Patient was seen by Dr. Ball at [facility] for MAGLAIE nodule.  Per Dr. Ball, magalie nodule was unchanged from 8/2020 to 2/2021.      Patient denied snoring.  No witnessed apnea.  Tire upon awake daily.  No parasomnia.  No cataplexy.  Sleep is difficult due to pain back and leg pain.      Raymond Sleepiness Scale score during initial sleep evaluation was 12.    S/p sleep study.  Still with lots of pain in knee and back.  S/p evaluation by Dr. Cerda but decline injection.      SLEEP ROUTINE:  Activity the hour prior to sleep: watch tv     Bed partner:  Wife   Time to bed:  12-1 am   Lights off:  off  Sleep onset latency:  Several hours         Disruptions or awakenings:    Frequent awakening    Wakeup time:      1-2 pm   Perceived sleep quality:  tire       Daytime naps:      Occasional napping during the day  Weekend sleep routine:      same  Caffeine use: 1 cup per day  exercise habit:   none      PAST MEDICAL HISTORY:    Active Ambulatory Problems     Diagnosis Date Noted    Neurofibromatosis, type 1 (von Recklinghausen's disease) 07/09/2012    Other diseases of respiratory system, not elsewhere classified 06/16/2012    Other symptoms involving respiratory system and chest 06/16/2012    Systemic inflammatory response syndrome due to non-infectious process without acute organ dysfunction 06/16/2012    Low back pain 07/09/2012    Dysphagia, oropharyngeal 04/01/2016     Chest pain, atypical 12/02/2021    CHEEK (dyspnea on exertion) 12/02/2021    Incomplete emptying of bladder 12/10/2021    PATRICIA (obstructive sleep apnea) 06/08/2022    Pulmonary nodule 06/09/2022    Insomnia 06/09/2022    Erythrocytosis 06/09/2022     Resolved Ambulatory Problems     Diagnosis Date Noted    Benign neoplasm of larynx 06/18/2012    NF (neurofibromatosis) 1983    Sleep disturbances 06/09/2022     Past Medical History:   Diagnosis Date    Arthritis     Headache(784.0)     Irregular heart beat     Mass of larynx 10/2/2012    Neurofibromatosis syndrome                 PAST SURGICAL HISTORY:    Past Surgical History:   Procedure Laterality Date    ARM DEBRIDEMENT      NF - right    partial supraglottic laryngectomy  6/15/2012    REMOVAL OF SACRAL NEUROSTIMULATOR DEVICE N/A 3/30/2022    Procedure: REMOVAL, NEUROSTIMULATOR, SACRAL;  Surgeon: KIP Shipley MD;  Location: [facility] OR;  Service: Urology;  Laterality: N/A;  FLUORO NEEDED  RN Pre OP, Covid screen 3-29-22.  C A    REPLACEMENT OF SACRAL NERVE STIMULATOR N/A 12/10/2021    Procedure: REPLACEMENT, NEUROSTIMULATOR, SACRAL;  Surgeon: KIP Shipley MD;  Location: [facility] OR;  Service: Urology;  Laterality: N/A;  [employer]  [facility] [phone] WILL BE HERE FOR THE CASE PER HANANE ON 12-1-2021 WILL BE HERE  RN Pre Op 11-23-21.  ---COVID NEGATIVE ON 12/8----- C A  IMPLANTS ORDERED ON 12-1-2021         FAMILY HISTORY:                Family History   Problem Relation Age of Onset    Cancer Father         neuro fibroma mitosis    Cancer Paternal Uncle         neuro fibroma mitosis    Cancer Cousin         neuro fibroma mitosis       SOCIAL HISTORY:          Tobacco:   Social History     Tobacco Use   Smoking Status Former Smoker   Smokeless Tobacco Former User       alcohol use:    Social History     Substance and Sexual Activity   Alcohol Use No                 Occupation:  disable    ALLERGIES:  Review of patient's allergies  indicates:  No Known Allergies    CURRENT MEDICATIONS:    Current Outpatient Medications   Medication Sig Dispense Refill    acetaminophen (TYLENOL) 500 MG tablet Take 1 tablet (500 mg total) by mouth every 6 (six) hours as needed for Pain. 30 tablet 0    albuterol (PROVENTIL/VENTOLIN HFA) 90 mcg/actuation inhaler Inhale into the lungs.      atorvastatin (LIPITOR) 10 MG tablet Take 1 tablet (10 mg total) by mouth once daily. 90 tablet 1    bacitracin 500 unit/gram Oint Apply topically 2 (two) times daily. 30 g 0    baclofen (LIORESAL) 10 MG tablet Take 1 tablet (10 mg total) by mouth 3 (three) times daily. 90 tablet 11    calcium-vitamin D3 (CALCIUM 500 + D) 500 mg(1,250mg) -200 unit per tablet Take 1 tablet by mouth 2 (two) times daily with meals. 180 tablet 3    cyanocobalamin (VITAMIN B-12) 1000 MCG tablet Take 100 mcg by mouth 2 (two) times a day.      cyclobenzaprine (FLEXERIL) 5 MG tablet Take 1 tablet (5 mg total) by mouth nightly as needed for Muscle spasms. 90 tablet 1    FLUoxetine 20 MG capsule Take 1 capsule (20 mg total) by mouth once daily. 90 capsule 3    HYDROcodone-acetaminophen (NORCO) 7.5-325 mg per tablet Take 1 tablet by mouth every 6 (six) hours as needed for Pain. 28 tablet 0    hydrOXYzine HCl (ATARAX) 25 MG tablet Take 1 tablet (25 mg total) by mouth 3 (three) times daily. DO NOT DRIVE AFTER TAKING MED 90 tablet 5    ibuprofen (ADVIL,MOTRIN) 800 MG tablet Take 1 tablet (800 mg total) by mouth every 6 (six) hours as needed for Pain. 20 tablet 0    levETIRAcetam (KEPPRA) 500 MG Tab Take 2 tablets (1,000 mg total) by mouth 2 (two) times daily. 360 tablet 3    levothyroxine sodium (LEVOTHYROXINE ORAL) Take by mouth.      metoprolol tartrate (LOPRESSOR) 25 MG tablet Take 0.5 tablets (12.5 mg total) by mouth 2 (two) times daily. 90 tablet 1    mupirocin (BACTROBAN) 2 % ointment Apply topically 3 (three) times daily. 30 g 0    nitroGLYCERIN (NITROSTAT) 0.4 MG SL tablet Place 1  "tablet (0.4 mg total) under the tongue every 5 (five) minutes as needed for Chest pain (Repeat in 5 min if CP persists. Go to ER if CP worsens). 30 tablet 2    simvastatin (ZOCOR) 20 MG tablet Take 20 mg by mouth every evening.      sumatriptan (IMITREX) 50 MG tablet 1 tab PO x1 prn. May take another tablet after 2 hours if the headache recurs. Maximum of 4 tablets a day. 12 tablet 1    vitamin D (VITAMIN D3) 1000 units Tab Take 1,000 Units by mouth 2 (two) times a day.      diclofenac sodium (VOLTAREN) 1 % Gel Apply 2 g topically 4 (four) times daily. 100 g 5     No current facility-administered medications for this visit.                  REVIEW OF SYSTEMS:     Sleep related symptoms as per HPI.  CONST:Denies weight gain    HEENT: Denies sinus congestion  PULM: Denies dyspnea  CARD:  Denies palpitations   GI:  Denies acid reflux  : Denies polyuria  NEURO: Denies headaches  PSYCH: anxious and depressed.    HEME: Denies anemia   Otherwise, a balance of systems reviewed is negative.          PHYSICAL EXAM:  Vitals:    07/18/22 1304   BP: 120/75   Pulse: 97   SpO2: 95%   Weight: 72.1 kg (158 lb 15.2 oz)   Height: 5' 4" (1.626 m)   PainSc: 0-No pain     Body mass index is 27.28 kg/m².     GENERAL: Normal development, well groomed  HEENT:  Conjunctivae are non-erythematous; Pupils equal, round, and reactive to light; Nose is symmetrical; Nasal mucosa is pink and moist; Septum is midline; Inferior turbinates are normal; Nasal airflow is normal; Posterior pharynx is pink; Modified Mallampati: 4; Posterior palate is normal; Tonsils +1; Uvula is normal and pink;Tongue is normal; Dentition is fair; No TMJ tenderness; Jaw opening and protrusion without click and without discomfort.  NECK: Supple. Neck circumference is 16 inches. No thyromegaly. No palpable nodes.     SKIN: On face and neck: No abrasions, no rashes, no lesions.  No subcutaneous nodules are palpable.  RESPIRATORY: Chest is clear to auscultation.  Normal " chest expansion and non-labored breathing at rest.  CARDIOVASCULAR: Normal S1, S2.  No murmurs, gallops or rubs. No carotid bruits bilaterally.  EXTREMITIES: No edema. No clubbing. No cyanosis. Station normal. Gait normal.        NEURO/PSYCH: Oriented to time, place and person. Normal attention span and concentration. Affect is full. Mood is normal.                                              DATA   psg 6/21/22 ahi of 25; csi of 10    cxr (personally reviewed) 2/23/22 no consolidation or effusion  CT chest 6/13/22 multiple nodules with largest at 5 mm nodule rul (unchanged when compared to 2/17/21).    Lab Results   Component Value Date    TSH 1.572 07/22/2020     ASSESSMENT/PLAN  Problem List Items Addressed This Visit     Low back pain    Overview     -baclofen, naproxen and flexeril.   -s/p evaluation by Dr. Cerda.  Decline injection.             PATRICIA (obstructive sleep apnea)    Overview     -ahi of 25 with csi of 10.  High csi may relate to chronic narcotic.             Current Assessment & Plan     -result of sleep study d/w patient.  Recommend treatment.  Will bring back for titration.  May need asv.             Relevant Orders    CPAP Titration (Must have dx of PATRICIA from previous sleep study.)    Pulmonary nodule    Overview     -followed by Dr. Ball at [facility] for magalie nodule.  CT was originally done at [facility].   -ct at [facility] 6/13/22 with multiple subcentimeter nodule.  Largest is 5 mm rul (3:229).  Unchanged when compared to 2/17/21.  Stability over 16 months and size suggestive of benign etiology.  Further imaging not recommended.                     Education: During our discussion today, we talked about the etiology of obstructive sleep apnea as well as the potential ramifications of untreated sleep apnea, which could include daytime sleepiness, hypertension, heart disease and/or stroke.     Precautions: The patient was advised to abstain from driving should they feel sleepy or drowsy.       Patient will  No follow-ups on file. with md/np.      25 minutes of total time spent on the encounter, which includes face to face time and non-face to face time preparing to see the patient (eg, review of tests), Obtaining and/or reviewing separately obtained history, documenting clinical information in the electronic or other health record, independently interpreting results (not separately reported) and communicating results to the patient/family/caregiver, or Care coordination (not separately reported).

## 2022-07-18 NOTE — ASSESSMENT & PLAN NOTE
-result of sleep study d/w patient.  Recommend treatment.  Will bring back for titration.  May need asv.

## 2022-07-20 ENCOUNTER — OFFICE VISIT (OUTPATIENT)
Dept: HEMATOLOGY/ONCOLOGY | Facility: CLINIC | Age: 37
End: 2022-07-20
Payer: MEDICARE

## 2022-07-20 VITALS
DIASTOLIC BLOOD PRESSURE: 58 MMHG | HEART RATE: 112 BPM | SYSTOLIC BLOOD PRESSURE: 125 MMHG | HEIGHT: 64 IN | OXYGEN SATURATION: 98 % | WEIGHT: 160 LBS | BODY MASS INDEX: 27.31 KG/M2

## 2022-07-20 DIAGNOSIS — E83.10 DISORDER OF IRON METABOLISM, UNSPECIFIED: ICD-10-CM

## 2022-07-20 DIAGNOSIS — D58.2 ELEVATED HEMOGLOBIN: Primary | ICD-10-CM

## 2022-07-20 DIAGNOSIS — Q85.01 NEUROFIBROMATOSIS, TYPE 1 (VON RECKLINGHAUSEN'S DISEASE): ICD-10-CM

## 2022-07-20 DIAGNOSIS — R91.8 PULMONARY NODULES: ICD-10-CM

## 2022-07-20 DIAGNOSIS — R56.9 SEIZURE: ICD-10-CM

## 2022-07-20 PROCEDURE — 3008F PR BODY MASS INDEX (BMI) DOCUMENTED: ICD-10-PCS | Mod: CPTII,S$GLB,, | Performed by: INTERNAL MEDICINE

## 2022-07-20 PROCEDURE — 1159F PR MEDICATION LIST DOCUMENTED IN MEDICAL RECORD: ICD-10-PCS | Mod: CPTII,S$GLB,, | Performed by: INTERNAL MEDICINE

## 2022-07-20 PROCEDURE — 3078F PR MOST RECENT DIASTOLIC BLOOD PRESSURE < 80 MM HG: ICD-10-PCS | Mod: CPTII,S$GLB,, | Performed by: INTERNAL MEDICINE

## 2022-07-20 PROCEDURE — 99999 PR PBB SHADOW E&M-EST. PATIENT-LVL IV: CPT | Mod: PBBFAC,,, | Performed by: INTERNAL MEDICINE

## 2022-07-20 PROCEDURE — 3008F BODY MASS INDEX DOCD: CPT | Mod: CPTII,S$GLB,, | Performed by: INTERNAL MEDICINE

## 2022-07-20 PROCEDURE — 99214 OFFICE O/P EST MOD 30 MIN: CPT | Mod: S$GLB,,, | Performed by: INTERNAL MEDICINE

## 2022-07-20 PROCEDURE — 1159F MED LIST DOCD IN RCRD: CPT | Mod: CPTII,S$GLB,, | Performed by: INTERNAL MEDICINE

## 2022-07-20 PROCEDURE — 99214 PR OFFICE/OUTPT VISIT, EST, LEVL IV, 30-39 MIN: ICD-10-PCS | Mod: S$GLB,,, | Performed by: INTERNAL MEDICINE

## 2022-07-20 PROCEDURE — 3078F DIAST BP <80 MM HG: CPT | Mod: CPTII,S$GLB,, | Performed by: INTERNAL MEDICINE

## 2022-07-20 PROCEDURE — 3074F PR MOST RECENT SYSTOLIC BLOOD PRESSURE < 130 MM HG: ICD-10-PCS | Mod: CPTII,S$GLB,, | Performed by: INTERNAL MEDICINE

## 2022-07-20 PROCEDURE — 3074F SYST BP LT 130 MM HG: CPT | Mod: CPTII,S$GLB,, | Performed by: INTERNAL MEDICINE

## 2022-07-20 PROCEDURE — 99999 PR PBB SHADOW E&M-EST. PATIENT-LVL IV: ICD-10-PCS | Mod: PBBFAC,,, | Performed by: INTERNAL MEDICINE

## 2022-07-20 NOTE — PROGRESS NOTES
PATIENT: Galindo Grant  MRN: [id number]  DATE: 2022      Diagnosis:   1. Elevated hemoglobin    2. Neurofibromatosis, type 1 (von Recklinghausen's disease)    3. Seizure    4. Pulmonary nodules            Chief Complaint: elevated hemoglobin    Subjective:     Interval History: Mr. Grant is a 37 y.o. male with Neurofibromatosis, arthritis, seizures who presents for follow up for elevated hematocrit.  Since the last clinci visit the patient underwent laboratory evaluation on 21.  Iron studies showed normal ferritin and TIBC.  Pt was negative for mutations with BCR/ABL, Jake2, MPL, and CALR.  EPO level was normal at 11.8mIU/mL.  Serum testosterone was normal at 822ng/mL.  The patient complains of chronic back pain from arthritis.  He complains of fatigue and occasional left sided pleuritic pain.  The patient denies cough, SOB, abdominal pain, N/V, constipation, diarrhea.  The patient denies fever, chills, night sweats, weight loss, new lumps or bumps, easy bruising or bleeding.  He states he last used testosterone at the end of .  Hx of testosterone supp use last yr pill form O[facility] ( pt reports was on it for 2 yrs) Pt  Followed by Pulmonology for pulmonary nodules. Pt's father  of NF-associated lung tumor, per pt. 2020 and 2021 chest CTs (in [facility] system) showed unchanged small HUSAM pleural based nodule and right perifussural nodule.     Interval Hx;   Doing well  Appetite and weight stable  Non-smoker  Pt denies excessive snoring  Wife had  reported pt snores  Mild fatigue  Pt reports occasional daytime sleepiness  Pt wakes up frequently nightime due to pain  CBC from 22 shows Hb 16.3g/dL      Pt f/b pulmonology for PATRICIA and pulmonary nodules       Past Medical History:   Past Medical History:   Diagnosis Date    Arthritis     in knee    Dysphagia, oropharyngeal 2016    Headache(784.0)     Irregular heart beat     Mass of larynx 10/2/2012     Neurofibromatosis syndrome     Neurofibromatosis, type 1 (von Recklinghausen's disease) birth    Neurofibromatosis, type 1 (von Recklinghausen's disease) 7/9/2012       Past Surgical HIstory:   Past Surgical History:   Procedure Laterality Date    ARM DEBRIDEMENT      NF - right    partial supraglottic laryngectomy  6/15/2012    REMOVAL OF SACRAL NEUROSTIMULATOR DEVICE N/A 3/30/2022    Procedure: REMOVAL, NEUROSTIMULATOR, SACRAL;  Surgeon: KIP Shipley MD;  Location: [facility] OR;  Service: Urology;  Laterality: N/A;  FLUORO NEEDED  RN Pre OP, Covid screen 3-29-22.  C A    REPLACEMENT OF SACRAL NERVE STIMULATOR N/A 12/10/2021    Procedure: REPLACEMENT, NEUROSTIMULATOR, SACRAL;  Surgeon: KIP Shipley MD;  Location: [facility] OR;  Service: Urology;  Laterality: N/A;  [email or web address]  [facility] SARAH [phone] WILL BE HERE FOR THE CASE PER HANANE ON 12-1-2021 WILL BE HERE  RN Pre Op 11-23-21.  ---COVID NEGATIVE ON 12/8----- C A  IMPLANTS ORDERED ON 12-1-2021       Family History:   Family History   Problem Relation Age of Onset    Cancer Father         neuro fibroma mitosis    Cancer Paternal Uncle         neuro fibroma mitosis    Cancer Cousin         neuro fibroma mitosis       Social History:  reports that he has quit smoking. He has quit using smokeless tobacco. He reports that he does not drink alcohol and does not use drugs.    Allergies:  Review of patient's allergies indicates:  No Known Allergies    Medications:  Current Outpatient Medications   Medication Sig Dispense Refill    acetaminophen (TYLENOL) 500 MG tablet Take 1 tablet (500 mg total) by mouth every 6 (six) hours as needed for Pain. 30 tablet 0    albuterol (PROVENTIL/VENTOLIN HFA) 90 mcg/actuation inhaler Inhale into the lungs.      atorvastatin (LIPITOR) 10 MG tablet Take 1 tablet (10 mg total) by mouth once daily. 90 tablet 1    bacitracin 500 unit/gram Oint Apply topically 2 (two) times daily. 30 g 0    baclofen (LIORESAL) 10 MG tablet Take  1 tablet (10 mg total) by mouth 3 (three) times daily. 90 tablet 11    calcium-vitamin D3 (CALCIUM 500 + D) 500 mg(1,250mg) -200 unit per tablet Take 1 tablet by mouth 2 (two) times daily with meals. 180 tablet 3    cyanocobalamin (VITAMIN B-12) 1000 MCG tablet Take 100 mcg by mouth 2 (two) times a day.      cyclobenzaprine (FLEXERIL) 5 MG tablet Take 1 tablet (5 mg total) by mouth nightly as needed for Muscle spasms. 90 tablet 1    FLUoxetine 20 MG capsule Take 1 capsule (20 mg total) by mouth once daily. 90 capsule 3    HYDROcodone-acetaminophen (NORCO) 7.5-325 mg per tablet Take 1 tablet by mouth every 6 (six) hours as needed for Pain. 28 tablet 0    hydrOXYzine HCl (ATARAX) 25 MG tablet Take 1 tablet (25 mg total) by mouth 3 (three) times daily. DO NOT DRIVE AFTER TAKING MED 90 tablet 5    ibuprofen (ADVIL,MOTRIN) 800 MG tablet Take 1 tablet (800 mg total) by mouth every 6 (six) hours as needed for Pain. 20 tablet 0    levETIRAcetam (KEPPRA) 500 MG Tab Take 2 tablets (1,000 mg total) by mouth 2 (two) times daily. 360 tablet 3    levothyroxine sodium (LEVOTHYROXINE ORAL) Take by mouth.      metoprolol tartrate (LOPRESSOR) 25 MG tablet Take 0.5 tablets (12.5 mg total) by mouth 2 (two) times daily. 90 tablet 1    mupirocin (BACTROBAN) 2 % ointment Apply topically 3 (three) times daily. 30 g 0    nitroGLYCERIN (NITROSTAT) 0.4 MG SL tablet Place 1 tablet (0.4 mg total) under the tongue every 5 (five) minutes as needed for Chest pain (Repeat in 5 min if CP persists. Go to ER if CP worsens). 30 tablet 2    simvastatin (ZOCOR) 20 MG tablet Take 20 mg by mouth every evening.      sumatriptan (IMITREX) 50 MG tablet 1 tab PO x1 prn. May take another tablet after 2 hours if the headache recurs. Maximum of 4 tablets a day. 12 tablet 1    vitamin D (VITAMIN D3) 1000 units Tab Take 1,000 Units by mouth 2 (two) times a day.      diclofenac sodium (VOLTAREN) 1 % Gel Apply 2 g topically 4 (four) times daily. 100 g  "5     No current facility-administered medications for this visit.       Review of Systems   Constitutional: Positive for fatigue. Negative for chills, diaphoresis, fever and unexpected weight change.   Respiratory: Negative for cough and shortness of breath.    Cardiovascular: Negative for chest pain and palpitations.   Gastrointestinal: Negative for abdominal pain, constipation, diarrhea, nausea and vomiting.   Musculoskeletal: Positive for back pain.   Skin: Negative for color change and rash.   Neurological: Negative for headaches.   Hematological: Negative for adenopathy. Does not bruise/bleed easily.       ECOG Performance Status: 0   Objective:      Vitals:   Vitals:    07/20/22 1500   BP: (!) 125/58   BP Location: Left arm   Patient Position: Sitting   BP Method: Large (Automatic)   Pulse: (!) 112   SpO2: 98%   Weight: 72.6 kg (160 lb)   Height: 5' 4" (1.626 m)       Physical Exam  Constitutional:       General: He is not in acute distress.     Appearance: He is well-developed. He is not diaphoretic.   HENT:      Head: Normocephalic and atraumatic.   Cardiovascular:      Rate and Rhythm: Normal rate and regular rhythm.      Heart sounds: No murmur heard.  Pulmonary:      Effort: Pulmonary effort is normal. No respiratory distress.      Breath sounds: Normal breath sounds. No wheezing or rales.   Chest:      Chest wall: No tenderness.   Breasts:      Right: No axillary adenopathy or supraclavicular adenopathy.      Left: No axillary adenopathy or supraclavicular adenopathy.       Abdominal:      General: Bowel sounds are normal. There is no distension.      Palpations: Abdomen is soft.      Tenderness: There is no abdominal tenderness. There is no rebound.   Musculoskeletal:         General: No tenderness. Normal range of motion.   Lymphadenopathy:      Cervical: No cervical adenopathy.      Upper Body:      Right upper body: No supraclavicular or axillary adenopathy.      Left upper body: No supraclavicular " or axillary adenopathy.   Skin:     General: Skin is warm and dry.      Findings: No erythema or rash.      Comments: Multiple subcutaneous nodules under his skin   Neurological:      Mental Status: He is alert and oriented to person, place, and time.      Coordination: Coordination normal.   Psychiatric:         Behavior: Behavior normal.         Laboratory Data:  No visits with results within 1 Week(s) from this visit.   Latest known visit with results is:   Lab Visit on 07/13/2022   Component Date Value Ref Range Status    WBC 07/13/2022 5.32  3.90 - 12.70 K/uL Final    RBC 07/13/2022 5.63  4.60 - 6.20 M/uL Final    Hemoglobin 07/13/2022 16.3  14.0 - 18.0 g/dL Final    Hematocrit 07/13/2022 48.8  40.0 - 54.0 % Final    MCV 07/13/2022 87  82 - 98 fL Final    MCH 07/13/2022 29.0  27.0 - 31.0 pg Final    MCHC 07/13/2022 33.4  32.0 - 36.0 g/dL Final    RDW 07/13/2022 12.3  11.5 - 14.5 % Final    Platelets 07/13/2022 249  150 - 450 K/uL Final    MPV 07/13/2022 10.1  9.2 - 12.9 fL Final    Immature Granulocytes 07/13/2022 0.0  0.0 - 0.5 % Final    Gran # (ANC) 07/13/2022 2.3  1.8 - 7.7 K/uL Final    Immature Grans (Abs) 07/13/2022 0.00  0.00 - 0.04 K/uL Final    Comment: Mild elevation in immature granulocytes is non specific and   can be seen in a variety of conditions including stress response,   acute inflammation, trauma and pregnancy. Correlation with other   laboratory and clinical findings is essential.      Lymph # 07/13/2022 2.1  1.0 - 4.8 K/uL Final    Mono # 07/13/2022 0.6  0.3 - 1.0 K/uL Final    Eos # 07/13/2022 0.3  0.0 - 0.5 K/uL Final    Baso # 07/13/2022 0.05  0.00 - 0.20 K/uL Final    nRBC 07/13/2022 0  0 /100 WBC Final    Gran % 07/13/2022 42.9  38.0 - 73.0 % Final    Lymph % 07/13/2022 39.3  18.0 - 48.0 % Final    Mono % 07/13/2022 10.5  4.0 - 15.0 % Final    Eosinophil % 07/13/2022 6.4  0.0 - 8.0 % Final    Basophil % 07/13/2022 0.9  0.0 - 1.9 % Final    Differential Method  07/13/2022 Automated   Final    Erythropoietin 07/13/2022 7.7  2.6 - 18.5 mIU/mL Final    Comment: Test performed at [facility],  [address], [place]  [address]     [phone]  Jakob Montoya MD  - Medical Director           Imaging: Reviewed       IMPRESSION:   Stable bilateral pulmonary nodules.        Assessment:       1. Elevated hemoglobin    2. Disorder of iron metabolism, unspecified    3. Neurofibromatosis, type 1 (von Recklinghausen's disease)    4. Seizure    5. Pulmonary nodules           Plan:     Elevated Hematocrit - The patient has 2 hemoglobin values in the last 3 years which were above the threshold of 16.5 to cause concern for polycythemia  -Hemoglobin on 7/22/20 was 18.7g/dL  -Repeat hemoglobin on 4/17/21 was 17g/dL  -The patient has been using testosterone on a regular basis  -His testosterone use may be contributing to his elevated hemoglobin values  -Pt again instructed to avoid testosterone use and that elevated hemoglobin values can predispose to heart disease and strokes  - lab work on 4/12/21 showed normal ferritin and TIBC. Pt was negative for mutations with BCR/ABL, Jake2 V617F, MPL, and CALR.  EPO level was normal at 11.8mIU/mL.  Serum testosterone was normal at 822ng/mL  - Jak2 Exon 12 mutationNegative. No pathogenic genetic alterations were detected in  JAK2 , exons 12-15  Negative results for [id number], CALR exon 9, and MPL exon 10   -hemoglobin 16.3 g/dL , improved  -likely secondary to PATRICIA  Cont to monitor counts      Neurofibromatosis - PT is being followed by Dr Otero in Neurology  -PT at increased risk of neural sheath tumors.    Seizure - pt states he has not had seizures in a long time  -Management per neurology      Pulmonary Nodules  F/b pulmonology   Small nodules unchnaged over 6 months between August 2020 and February 2021.  CT chest  6/13/22  Lung-RADS Category:  1-benign appearance           CBC and EPO prior to f/u  in  4mos

## 2022-08-02 ENCOUNTER — HOSPITAL ENCOUNTER (OUTPATIENT)
Dept: PREADMISSION TESTING | Facility: HOSPITAL | Age: 37
Discharge: HOME | End: 2022-08-02
Attending: INTERNAL MEDICINE
Payer: MEDICARE

## 2022-08-02 DIAGNOSIS — Z11.52 ENCOUNTER FOR PREOPERATIVE SCREENING LABORATORY TESTING FOR COVID-19 VIRUS: Primary | ICD-10-CM

## 2022-08-02 DIAGNOSIS — Z01.812 ENCOUNTER FOR PREOPERATIVE SCREENING LABORATORY TESTING FOR COVID-19 VIRUS: Primary | ICD-10-CM

## 2022-08-02 LAB — SARS-COV-2 RDRP RESP QL NAA+PROBE: NEGATIVE

## 2022-08-02 PROCEDURE — U0002 COVID-19 LAB TEST NON-CDC: HCPCS | Performed by: INTERNAL MEDICINE

## 2022-08-03 ENCOUNTER — HOSPITAL ENCOUNTER (OUTPATIENT)
Dept: SLEEP MEDICINE | Facility: HOSPITAL | Age: 37
Discharge: HOME | End: 2022-08-03
Attending: INTERNAL MEDICINE
Payer: MEDICARE

## 2022-08-03 DIAGNOSIS — G47.33 OSA (OBSTRUCTIVE SLEEP APNEA): ICD-10-CM

## 2022-08-03 DIAGNOSIS — G47.31 COMPLEX SLEEP APNEA SYNDROME: ICD-10-CM

## 2022-08-03 PROCEDURE — 95811 PR POLYSOMNOGRAPHY W/CPAP: ICD-10-PCS | Mod: 26,,, | Performed by: INTERNAL MEDICINE

## 2022-08-03 PROCEDURE — 95811 POLYSOM 6/>YRS CPAP 4/> PARM: CPT

## 2022-08-03 PROCEDURE — 95811 POLYSOM 6/>YRS CPAP 4/> PARM: CPT | Mod: 26,,, | Performed by: INTERNAL MEDICINE

## 2022-08-04 NOTE — PROGRESS NOTES
A CPAP titration study was performed on Galindo Grant. The entire procedure was explained, including Bio calibration procedure, patient was informed that there may be a need to enter the room during the night to fix lead or make adjustments to the equipment. Questions were answered prior to start of study. He was given instructions on how to call out for help including how to use the nurse call unit in the bathroom. Patient was given [employer] phone number to call if patient needed tech for anything, in case tech was out of tech room.  Patient education was performed. This includes the possible use of CPAP machine and different CPAP masks. He was given the after visit summary.   The lowest oxygen saturation observed during sleep was 83%   The EKG appeared to be NSR  The patient was titrated from CPAP of 5 cm H2O to 12 Cm H2O. The patient was then switched from 12 cm H2O to ASV auto (Min EPAP 4, max EPAP 10, Min PS 0, Max PS 15 to better control central events. Eson nasal mask small was used with chin strap, humidity, and EPR. Supine REM sleep was obtained during the study. His reaction to PAP was it was fine.

## 2022-08-11 ENCOUNTER — TELEPHONE (OUTPATIENT)
Dept: PULMONOLOGY | Facility: CLINIC | Age: 37
End: 2022-08-11
Payer: MEDICARE

## 2022-08-11 DIAGNOSIS — G47.31 COMPLEX SLEEP APNEA SYNDROME: Primary | ICD-10-CM

## 2022-08-11 PROBLEM — G47.39 COMPLEX SLEEP APNEA SYNDROME: Status: ACTIVE | Noted: 2022-06-08

## 2022-08-12 NOTE — TELEPHONE ENCOUNTER
Please let patient know that the titration went well and I would like to set patient up with ASV via dme of choice.  Due nationwide CPAP shortage, advise patient to expect a call from in 2 months.  Clinic follow up with md/np in approximately 8 weeks after ASV usage.

## 2022-08-12 NOTE — PROCEDURES
"Dear Provider,     You have ordered sleep LAB services to perform the sleep study for Galindo Grant.  The sleep study that you ordered is complete.      Please find Sleep Study result in "Chart Review" under the "Media tab."      As the ordering provider, you are responsible for reviewing the results and implementing a treatment plan with your patient.    If you need a Sleep Medicine provider to explain the sleep study findings and arrange treatment for the patient, please refer patient for consultation to our Sleep Clinic via [facility] with Ambulatory Consult Sleep.    To do that please place an order for an  "Ambulatory Consult Sleep" - it will go to our clinic work queue for our Medical Assistant to contact the patient for an appointment.     For any questions, please contact our clinic staff at [phone] to talk to clinical staff.   "

## 2022-11-03 ENCOUNTER — OFFICE VISIT (OUTPATIENT)
Dept: NEUROLOGY | Facility: CLINIC | Age: 37
End: 2022-11-03
Payer: MEDICARE

## 2022-11-03 VITALS
HEIGHT: 64 IN | SYSTOLIC BLOOD PRESSURE: 123 MMHG | DIASTOLIC BLOOD PRESSURE: 66 MMHG | HEART RATE: 95 BPM | BODY MASS INDEX: 27.77 KG/M2 | WEIGHT: 162.69 LBS

## 2022-11-03 DIAGNOSIS — M41.9 SCOLIOSIS OF THORACOLUMBAR SPINE, UNSPECIFIED SCOLIOSIS TYPE: ICD-10-CM

## 2022-11-03 DIAGNOSIS — Q85.01 NEUROFIBROMATOSIS, TYPE 1 (VON RECKLINGHAUSEN'S DISEASE): ICD-10-CM

## 2022-11-03 DIAGNOSIS — R29.3 ABNORMAL POSTURE: Primary | ICD-10-CM

## 2022-11-03 DIAGNOSIS — R56.9 SEIZURE: ICD-10-CM

## 2022-11-03 DIAGNOSIS — M54.16 LUMBAR RADICULOPATHY, CHRONIC: ICD-10-CM

## 2022-11-03 DIAGNOSIS — M54.6 PAIN IN THORACIC SPINE: ICD-10-CM

## 2022-11-03 PROCEDURE — 3008F BODY MASS INDEX DOCD: CPT | Mod: CPTII,S$GLB,, | Performed by: STUDENT IN AN ORGANIZED HEALTH CARE EDUCATION/TRAINING PROGRAM

## 2022-11-03 PROCEDURE — 99999 PR PBB SHADOW E&M-EST. PATIENT-LVL IV: ICD-10-PCS | Mod: PBBFAC,,, | Performed by: STUDENT IN AN ORGANIZED HEALTH CARE EDUCATION/TRAINING PROGRAM

## 2022-11-03 PROCEDURE — 3074F PR MOST RECENT SYSTOLIC BLOOD PRESSURE < 130 MM HG: ICD-10-PCS | Mod: CPTII,S$GLB,, | Performed by: STUDENT IN AN ORGANIZED HEALTH CARE EDUCATION/TRAINING PROGRAM

## 2022-11-03 PROCEDURE — 3008F PR BODY MASS INDEX (BMI) DOCUMENTED: ICD-10-PCS | Mod: CPTII,S$GLB,, | Performed by: STUDENT IN AN ORGANIZED HEALTH CARE EDUCATION/TRAINING PROGRAM

## 2022-11-03 PROCEDURE — 3078F DIAST BP <80 MM HG: CPT | Mod: CPTII,S$GLB,, | Performed by: STUDENT IN AN ORGANIZED HEALTH CARE EDUCATION/TRAINING PROGRAM

## 2022-11-03 PROCEDURE — 99214 PR OFFICE/OUTPT VISIT, EST, LEVL IV, 30-39 MIN: ICD-10-PCS | Mod: S$GLB,,, | Performed by: STUDENT IN AN ORGANIZED HEALTH CARE EDUCATION/TRAINING PROGRAM

## 2022-11-03 PROCEDURE — 3074F SYST BP LT 130 MM HG: CPT | Mod: CPTII,S$GLB,, | Performed by: STUDENT IN AN ORGANIZED HEALTH CARE EDUCATION/TRAINING PROGRAM

## 2022-11-03 PROCEDURE — 99214 OFFICE O/P EST MOD 30 MIN: CPT | Mod: S$GLB,,, | Performed by: STUDENT IN AN ORGANIZED HEALTH CARE EDUCATION/TRAINING PROGRAM

## 2022-11-03 PROCEDURE — 99999 PR PBB SHADOW E&M-EST. PATIENT-LVL IV: CPT | Mod: PBBFAC,,, | Performed by: STUDENT IN AN ORGANIZED HEALTH CARE EDUCATION/TRAINING PROGRAM

## 2022-11-03 PROCEDURE — 3078F PR MOST RECENT DIASTOLIC BLOOD PRESSURE < 80 MM HG: ICD-10-PCS | Mod: CPTII,S$GLB,, | Performed by: STUDENT IN AN ORGANIZED HEALTH CARE EDUCATION/TRAINING PROGRAM

## 2022-11-03 RX ORDER — ROSUVASTATIN CALCIUM 10 MG/1
10 TABLET, COATED ORAL DAILY
COMMUNITY

## 2022-11-07 ENCOUNTER — OFFICE VISIT (OUTPATIENT)
Dept: PULMONOLOGY | Facility: CLINIC | Age: 37
End: 2022-11-07
Payer: MEDICARE

## 2022-11-07 VITALS
HEART RATE: 71 BPM | BODY MASS INDEX: 27.75 KG/M2 | HEIGHT: 64 IN | WEIGHT: 162.56 LBS | DIASTOLIC BLOOD PRESSURE: 83 MMHG | OXYGEN SATURATION: 98 % | SYSTOLIC BLOOD PRESSURE: 119 MMHG

## 2022-11-07 DIAGNOSIS — G47.31 COMPLEX SLEEP APNEA SYNDROME: ICD-10-CM

## 2022-11-07 DIAGNOSIS — M54.50 BILATERAL LOW BACK PAIN WITHOUT SCIATICA, UNSPECIFIED CHRONICITY: ICD-10-CM

## 2022-11-07 PROCEDURE — 99999 PR PBB SHADOW E&M-EST. PATIENT-LVL IV: ICD-10-PCS | Mod: PBBFAC,,, | Performed by: INTERNAL MEDICINE

## 2022-11-07 PROCEDURE — 99213 PR OFFICE/OUTPT VISIT, EST, LEVL III, 20-29 MIN: ICD-10-PCS | Mod: S$GLB,,, | Performed by: INTERNAL MEDICINE

## 2022-11-07 PROCEDURE — 3079F DIAST BP 80-89 MM HG: CPT | Mod: CPTII,S$GLB,, | Performed by: INTERNAL MEDICINE

## 2022-11-07 PROCEDURE — 99999 PR PBB SHADOW E&M-EST. PATIENT-LVL IV: CPT | Mod: PBBFAC,,, | Performed by: INTERNAL MEDICINE

## 2022-11-07 PROCEDURE — 1159F PR MEDICATION LIST DOCUMENTED IN MEDICAL RECORD: ICD-10-PCS | Mod: CPTII,S$GLB,, | Performed by: INTERNAL MEDICINE

## 2022-11-07 PROCEDURE — 3074F PR MOST RECENT SYSTOLIC BLOOD PRESSURE < 130 MM HG: ICD-10-PCS | Mod: CPTII,S$GLB,, | Performed by: INTERNAL MEDICINE

## 2022-11-07 PROCEDURE — 1159F MED LIST DOCD IN RCRD: CPT | Mod: CPTII,S$GLB,, | Performed by: INTERNAL MEDICINE

## 2022-11-07 PROCEDURE — 3008F BODY MASS INDEX DOCD: CPT | Mod: CPTII,S$GLB,, | Performed by: INTERNAL MEDICINE

## 2022-11-07 PROCEDURE — 99213 OFFICE O/P EST LOW 20 MIN: CPT | Mod: S$GLB,,, | Performed by: INTERNAL MEDICINE

## 2022-11-07 PROCEDURE — 3079F PR MOST RECENT DIASTOLIC BLOOD PRESSURE 80-89 MM HG: ICD-10-PCS | Mod: CPTII,S$GLB,, | Performed by: INTERNAL MEDICINE

## 2022-11-07 PROCEDURE — 3008F PR BODY MASS INDEX (BMI) DOCUMENTED: ICD-10-PCS | Mod: CPTII,S$GLB,, | Performed by: INTERNAL MEDICINE

## 2022-11-07 PROCEDURE — 3074F SYST BP LT 130 MM HG: CPT | Mod: CPTII,S$GLB,, | Performed by: INTERNAL MEDICINE

## 2022-11-07 NOTE — PROGRESS NOTES
Galindo Grant  was seen as a follow up.      CHIEF COMPLAINT:    Chief Complaint   Patient presents with    Apnea       HISTORY OF PRESENT ILLNESS: Galindo Grant is a 37 y.o. male is here for sleep evaluation.   Patient was seen by Dr. De La Cruz for elevated hemoglobin and hematocrit.  Patient was referred to our clinic to assess for underlying sleep/pulmonary disorder that could contributed to erythrocytosis.  Our first encounter was 6/8/22.  Patient with remote smoking history.  1-2 cigarette per day as a teenager for a few months.  activities is limited by pain a/w scoliosis.  Patient was seen by Dr. Ball at [facility] for MAGALIE nodule.  Per Dr. Ball, magalie nodule was unchanged from 8/2020 to 2/2021.      Patient denied snoring.  No witnessed apnea.  Tire upon awake daily.  No parasomnia.  No cataplexy.  Sleep is difficult due to pain back and leg pain.      [place] Sleepiness Scale score during initial sleep evaluation was 12.  ESS today is 3.    S/p sleep study with CSI of 25.  High central apnea index may relate to chronic narcotic.  Patient s/p asv titration on 8/3/22.   Patient is using asv nightly.  Feeling rested upon awake.      SLEEP ROUTINE:  Activity the hour prior to sleep: watch tv     Bed partner:  Wife   Time to bed:  12-1 am   Lights off:  off  Sleep onset latency:  Several hours         Disruptions or awakenings:    Frequent awakening due to pain  Wakeup time:      1-2 pm   Perceived sleep quality:  tire       Daytime naps:      Occasional napping during the day  Weekend sleep routine:      same  Caffeine use: 1 cup per day  exercise habit:   none      PAST MEDICAL HISTORY:    Active Ambulatory Problems     Diagnosis Date Noted    Neurofibromatosis, type 1 (von Recklinghausen's disease) 07/09/2012    Other diseases of respiratory system, not elsewhere classified 06/16/2012    Other symptoms involving respiratory system and chest 06/16/2012    Systemic inflammatory response syndrome due to  non-infectious process without acute organ dysfunction 06/16/2012    Low back pain 07/09/2012    Dysphagia, oropharyngeal 04/01/2016    Chest pain, atypical 12/02/2021    CHEEK (dyspnea on exertion) 12/02/2021    Incomplete emptying of bladder 12/10/2021    Complex sleep apnea syndrome 06/08/2022    Pulmonary nodule 06/09/2022    Insomnia 06/09/2022    Erythrocytosis 06/09/2022     Resolved Ambulatory Problems     Diagnosis Date Noted    Benign neoplasm of larynx 06/18/2012    NF (neurofibromatosis) 1983    Sleep disturbances 06/09/2022     Past Medical History:   Diagnosis Date    Arthritis     Headache(784.0)     Irregular heart beat     Mass of larynx 10/2/2012    Neurofibromatosis syndrome                 PAST SURGICAL HISTORY:    Past Surgical History:   Procedure Laterality Date    ARM DEBRIDEMENT      NF - right    partial supraglottic laryngectomy  6/15/2012    REMOVAL OF SACRAL NEUROSTIMULATOR DEVICE N/A 3/30/2022    Procedure: REMOVAL, NEUROSTIMULATOR, SACRAL;  Surgeon: KIP Shipley MD;  Location: [facility] OR;  Service: Urology;  Laterality: N/A;  FLUORO NEEDED  RN Pre OP, Covid screen 3-29-22.  C A    REPLACEMENT OF SACRAL NERVE STIMULATOR N/A 12/10/2021    Procedure: REPLACEMENT, NEUROSTIMULATOR, SACRAL;  Surgeon: KIP Shipley MD;  Location: [facility] OR;  Service: Urology;  Laterality: N/A;  [employer]  MICHELL SARAH [phone] WILL BE HERE FOR THE CASE PER HANANE ON 12-1-2021 WILL BE HERE  RN Pre Op 11-23-21.  ---COVID NEGATIVE ON 12/8----- C A  IMPLANTS ORDERED ON 12-1-2021         FAMILY HISTORY:                Family History   Problem Relation Age of Onset    Cancer Father         neuro fibroma mitosis    Cancer Paternal Uncle         neuro fibroma mitosis    Cancer Cousin         neuro fibroma mitosis       SOCIAL HISTORY:          Tobacco:   Social History     Tobacco Use   Smoking Status Former   Smokeless Tobacco Former       alcohol use:    Social History     Substance and Sexual Activity    Alcohol Use No                 Occupation:  disable    ALLERGIES:  Review of patient's allergies indicates:  No Known Allergies    CURRENT MEDICATIONS:    Current Outpatient Medications   Medication Sig Dispense Refill    acetaminophen (TYLENOL) 500 MG tablet Take 1 tablet (500 mg total) by mouth every 6 (six) hours as needed for Pain. 30 tablet 0    albuterol (PROVENTIL/VENTOLIN HFA) 90 mcg/actuation inhaler Inhale into the lungs.      atorvastatin (LIPITOR) 10 MG tablet Take 1 tablet (10 mg total) by mouth once daily. 90 tablet 1    bacitracin 500 unit/gram Oint Apply topically 2 (two) times daily. 30 g 0    baclofen (LIORESAL) 10 MG tablet Take 1 tablet (10 mg total) by mouth 3 (three) times daily. 90 tablet 11    calcium-vitamin D3 (CALCIUM 500 + D) 500 mg(1,250mg) -200 unit per tablet Take 1 tablet by mouth 2 (two) times daily with meals. 180 tablet 3    cyanocobalamin (VITAMIN B-12) 1000 MCG tablet Take 100 mcg by mouth 2 (two) times a day.      cyclobenzaprine (FLEXERIL) 5 MG tablet Take 1 tablet (5 mg total) by mouth nightly as needed for Muscle spasms. 90 tablet 1    FLUoxetine 20 MG capsule Take 1 capsule (20 mg total) by mouth once daily. 90 capsule 3    HYDROcodone-acetaminophen (NORCO) 7.5-325 mg per tablet Take 1 tablet by mouth every 6 (six) hours as needed for Pain. 28 tablet 0    hydrOXYzine HCl (ATARAX) 25 MG tablet Take 1 tablet (25 mg total) by mouth 3 (three) times daily. DO NOT DRIVE AFTER TAKING MED 90 tablet 5    ibuprofen (ADVIL,MOTRIN) 800 MG tablet Take 1 tablet (800 mg total) by mouth every 6 (six) hours as needed for Pain. 20 tablet 0    levETIRAcetam (KEPPRA) 500 MG Tab Take 2 tablets (1,000 mg total) by mouth 2 (two) times daily. 360 tablet 3    levothyroxine sodium (LEVOTHYROXINE ORAL) Take by mouth.      metoprolol tartrate (LOPRESSOR) 25 MG tablet Take 0.5 tablets (12.5 mg total) by mouth 2 (two) times daily. 90 tablet 1    mupirocin (BACTROBAN) 2 % ointment Apply topically 3  "(three) times daily. 30 g 0    nitroGLYCERIN (NITROSTAT) 0.4 MG SL tablet Place 1 tablet (0.4 mg total) under the tongue every 5 (five) minutes as needed for Chest pain (Repeat in 5 min if CP persists. Go to ER if CP worsens). 30 tablet 2    rosuvastatin (CRESTOR) 10 MG tablet Take 10 mg by mouth once daily.      simvastatin (ZOCOR) 20 MG tablet Take 20 mg by mouth every evening.      sumatriptan (IMITREX) 50 MG tablet 1 tab PO x1 prn. May take another tablet after 2 hours if the headache recurs. Maximum of 4 tablets a day. 12 tablet 1    vitamin D (VITAMIN D3) 1000 units Tab Take 1,000 Units by mouth 2 (two) times a day.      diclofenac sodium (VOLTAREN) 1 % Gel Apply 2 g topically 4 (four) times daily. 100 g 5     No current facility-administered medications for this visit.                  REVIEW OF SYSTEMS:     Sleep related symptoms as per HPI.  CONST:Denies weight gain    HEENT: Denies sinus congestion  PULM: Denies dyspnea  CARD:  Denies palpitations   GI:  Denies acid reflux  : Denies polyuria  NEURO: Denies headaches  PSYCH: anxious and depressed.    HEME: Denies anemia   Otherwise, a balance of systems reviewed is negative.          PHYSICAL EXAM:  Vitals:    11/07/22 0855   BP: 119/83   Pulse: 71   SpO2: 98%   Weight: 73.8 kg (162 lb 9.4 oz)   Height: 5' 4" (1.626 m)   PainSc: 0-No pain     Body mass index is 27.91 kg/m².     GENERAL: Normal development, well groomed  HEENT:  Conjunctivae are non-erythematous; Pupils equal, round, and reactive to light; Nose is symmetrical; Nasal mucosa is pink and moist; Septum is midline; Inferior turbinates are normal; Nasal airflow is normal; Posterior pharynx is pink; Modified Mallampati: 4; Posterior palate is normal; Tonsils +1; Uvula is normal and pink;Tongue is normal; Dentition is fair; No TMJ tenderness; Jaw opening and protrusion without click and without discomfort.  NECK: Supple. Neck circumference is 16 inches. No thyromegaly. No palpable nodes.     SKIN: " On face and neck: No abrasions, no rashes, no lesions.  No subcutaneous nodules are palpable.  RESPIRATORY: Chest is clear to auscultation.  Normal chest expansion and non-labored breathing at rest.  CARDIOVASCULAR: Normal S1, S2.  No murmurs, gallops or rubs. No carotid bruits bilaterally.  EXTREMITIES: No edema. No clubbing. No cyanosis. Station normal. Gait normal.        NEURO/PSYCH: Oriented to time, place and person. Normal attention span and concentration. Affect is full. Mood is normal.                                              DATA   psg 6/21/22 ahi of 25; csi of 10  Asv titration 8/3/22 ahi of 15    cxr (personally reviewed) 2/23/22 no consolidation or effusion  CT chest 6/13/22 multiple nodules with largest at 5 mm nodule rul (unchanged when compared to 2/17/21).    Lab Results   Component Value Date    TSH 1.572 07/22/2020     ASSESSMENT/PLAN  Problem List Items Addressed This Visit       Complex sleep apnea syndrome    Overview     -ahi of 25 with csi of 10.  High csi may relate to chronic narcotic.    -doing well with asv.  93%>4 hours.  Residual ahi of 1.8.  Patient is benefiting from asv         Low back pain    Overview     -baclofen, naproxen and flexeril.   -follow up with pain clinic.                    Education: During our discussion today, we talked about the etiology of obstructive sleep apnea as well as the potential ramifications of untreated sleep apnea, which could include daytime sleepiness, hypertension, heart disease and/or stroke.     Precautions: The patient was advised to abstain from driving should they feel sleepy or drowsy.       Patient will No follow-ups on file. with md/np.      20 minutes of total time spent on the encounter, which includes face to face time and non-face to face time preparing to see the patient (eg, review of tests), Obtaining and/or reviewing separately obtained history, documenting clinical information in the electronic or other health record,  independently interpreting results (not separately reported) and communicating results to the patient/family/caregiver, or Care coordination (not separately reported).

## 2022-11-11 ENCOUNTER — HOSPITAL ENCOUNTER (OUTPATIENT)
Dept: RADIOLOGY | Facility: HOSPITAL | Age: 37
Discharge: HOME | End: 2022-11-11
Attending: STUDENT IN AN ORGANIZED HEALTH CARE EDUCATION/TRAINING PROGRAM
Payer: MEDICARE

## 2022-11-11 DIAGNOSIS — R29.3 ABNORMAL POSTURE: ICD-10-CM

## 2022-11-11 DIAGNOSIS — M54.6 PAIN IN THORACIC SPINE: ICD-10-CM

## 2022-11-11 DIAGNOSIS — M54.16 LUMBAR RADICULOPATHY, CHRONIC: ICD-10-CM

## 2022-11-11 DIAGNOSIS — M41.9 SCOLIOSIS OF THORACOLUMBAR SPINE, UNSPECIFIED SCOLIOSIS TYPE: ICD-10-CM

## 2022-11-11 PROCEDURE — 72157 MRI CHEST SPINE W/O & W/DYE: CPT | Mod: TC

## 2022-11-11 PROCEDURE — 72157 MRI CHEST SPINE W/O & W/DYE: CPT | Mod: 26,,, | Performed by: RADIOLOGY

## 2022-11-11 PROCEDURE — 72158 MRI LUMBAR SPINE W/O & W/DYE: CPT | Mod: TC

## 2022-11-11 PROCEDURE — 72158 MRI LUMBAR SPINE W WO CONTRAST: ICD-10-PCS | Mod: 26,,, | Performed by: RADIOLOGY

## 2022-11-11 PROCEDURE — 25500020 PHARM REV CODE 255: Performed by: STUDENT IN AN ORGANIZED HEALTH CARE EDUCATION/TRAINING PROGRAM

## 2022-11-11 PROCEDURE — A9585 GADOBUTROL INJECTION: HCPCS | Performed by: STUDENT IN AN ORGANIZED HEALTH CARE EDUCATION/TRAINING PROGRAM

## 2022-11-11 PROCEDURE — 72157 MRI THORACIC SPINE W WO CONTRAST: ICD-10-PCS | Mod: 26,,, | Performed by: RADIOLOGY

## 2022-11-11 PROCEDURE — 72158 MRI LUMBAR SPINE W/O & W/DYE: CPT | Mod: 26,,, | Performed by: RADIOLOGY

## 2022-11-11 RX ORDER — GADOBUTROL 604.72 MG/ML
7.5 INJECTION INTRAVENOUS
Status: COMPLETED | OUTPATIENT
Start: 2022-11-11 | End: 2022-11-11

## 2022-11-11 RX ADMIN — GADOBUTROL 7.5 ML: 604.72 INJECTION INTRAVENOUS at 07:11

## 2022-11-16 PROBLEM — R56.9 SEIZURE: Status: ACTIVE | Noted: 2022-11-16

## 2022-11-16 NOTE — PROGRESS NOTES
Chief Complaint   Patient presents with    Follow-up        Galindo Grant is a 37 y.o. male with a history of multiple medical diagnoses as listed below that presents for evaluation to establish care for his history of seizures and neurofibromatosis.  Patient says that he has been taking medications as directed.  He has been concerned because he has been having increasing presents of neurofibromas on the scan.  He has not had any changes in his level of functioning.  No recent headaches.  He has an upcoming vision examination as he does complain of occasional episodes with vision seems to become less clear than he is accustomed.  He says that he has had a seizure recently despite being compliant with his medications.    Interval History  04/12/2021  Back pains, which are chronic have been his biggest botehr today. He has had CT as recommended. No headaches. He has recently seen his optometrist. No headaches. No seizures recently.    09/29/2021  Been doing well overall with exception of significant pain in his midback and low back.  Pain is so intense that time he feels that he has been unable to move.  No recent seizures.  Gabapentin was stopped because of undesirable side effects.  Other medications have been taking as directed without complaint.    11/16/2022  Concerned for significant worsening pain in his midback and low back radiating down his legs. No new weakness. No new bowel or bladder incontinence. No recent seizures.     PAST MEDICAL HISTORY:  Past Medical History:   Diagnosis Date    Arthritis     in knee    Dysphagia, oropharyngeal 4/1/2016    Headache(784.0)     Irregular heart beat     Mass of larynx 10/2/2012    Neurofibromatosis syndrome     Neurofibromatosis, type 1 (von Recklinghausen's disease) birth    Neurofibromatosis, type 1 (von Recklinghausen's disease) 7/9/2012    Seizure 11/16/2022       PAST SURGICAL HISTORY:  Past Surgical History:   Procedure Laterality Date    ARM DEBRIDEMENT       NF - right    partial supraglottic laryngectomy  6/15/2012    REMOVAL OF SACRAL NEUROSTIMULATOR DEVICE N/A 3/30/2022    Procedure: REMOVAL, NEUROSTIMULATOR, SACRAL;  Surgeon: KIP Shipley MD;  Location: [facility] OR;  Service: Urology;  Laterality: N/A;  FLUORO NEEDED  RN Pre OP, Covid screen 3-29-22.  C A    REPLACEMENT OF SACRAL NERVE STIMULATOR N/A 12/10/2021    Procedure: REPLACEMENT, NEUROSTIMULATOR, SACRAL;  Surgeon: KIP Shipley MD;  Location: [facility] OR;  Service: Urology;  Laterality: N/A;  [employer]  SUMMER SARAH [phone] WILL BE HERE FOR THE CASE PER HANANE ON 12-1-2021 WILL BE HERE  RN Pre Op 11-23-21.  ---COVID NEGATIVE ON 12/8----- C A  IMPLANTS ORDERED ON 12-1-2021       SOCIAL HISTORY:  Social History     Socioeconomic History    Marital status:     Number of children: 1    Years of education: 12   Occupational History    Occupation:      Employer: OTHER   Tobacco Use    Smoking status: Former    Smokeless tobacco: Former   Substance and Sexual Activity    Alcohol use: No    Drug use: No    Sexual activity: Yes     Partners: Female   Social History Narrative    Moved back to [facility] in 2015. Now .        FAMILY HISTORY:  Family History   Problem Relation Age of Onset    Cancer Father         neuro fibroma mitosis    Cancer Paternal Uncle         neuro fibroma mitosis    Cancer Cousin         neuro fibroma mitosis       ALLERGIES AND MEDICATIONS: updated and reviewed.  Review of patient's allergies indicates:  No Known Allergies  Current Outpatient Medications   Medication Sig Dispense Refill    acetaminophen (TYLENOL) 500 MG tablet Take 1 tablet (500 mg total) by mouth every 6 (six) hours as needed for Pain. 30 tablet 0    albuterol (PROVENTIL/VENTOLIN HFA) 90 mcg/actuation inhaler Inhale into the lungs.      atorvastatin (LIPITOR) 10 MG tablet Take 1 tablet (10 mg total) by mouth once daily. 90 tablet 1    bacitracin 500 unit/gram Oint Apply topically 2 (two) times daily. 30  g 0    baclofen (LIORESAL) 10 MG tablet Take 1 tablet (10 mg total) by mouth 3 (three) times daily. 90 tablet 11    calcium-vitamin D3 (CALCIUM 500 + D) 500 mg(1,250mg) -200 unit per tablet Take 1 tablet by mouth 2 (two) times daily with meals. 180 tablet 3    cyanocobalamin (VITAMIN B-12) 1000 MCG tablet Take 100 mcg by mouth 2 (two) times a day.      cyclobenzaprine (FLEXERIL) 5 MG tablet Take 1 tablet (5 mg total) by mouth nightly as needed for Muscle spasms. 90 tablet 1    diclofenac sodium (VOLTAREN) 1 % Gel Apply 2 g topically 4 (four) times daily. 100 g 5    FLUoxetine 20 MG capsule Take 1 capsule (20 mg total) by mouth once daily. 90 capsule 3    HYDROcodone-acetaminophen (NORCO) 7.5-325 mg per tablet Take 1 tablet by mouth every 6 (six) hours as needed for Pain. 28 tablet 0    hydrOXYzine HCl (ATARAX) 25 MG tablet Take 1 tablet (25 mg total) by mouth 3 (three) times daily. DO NOT DRIVE AFTER TAKING MED 90 tablet 5    ibuprofen (ADVIL,MOTRIN) 800 MG tablet Take 1 tablet (800 mg total) by mouth every 6 (six) hours as needed for Pain. 20 tablet 0    levETIRAcetam (KEPPRA) 500 MG Tab Take 2 tablets (1,000 mg total) by mouth 2 (two) times daily. 360 tablet 3    levothyroxine sodium (LEVOTHYROXINE ORAL) Take by mouth.      metoprolol tartrate (LOPRESSOR) 25 MG tablet Take 0.5 tablets (12.5 mg total) by mouth 2 (two) times daily. 90 tablet 1    mupirocin (BACTROBAN) 2 % ointment Apply topically 3 (three) times daily. 30 g 0    nitroGLYCERIN (NITROSTAT) 0.4 MG SL tablet Place 1 tablet (0.4 mg total) under the tongue every 5 (five) minutes as needed for Chest pain (Repeat in 5 min if CP persists. Go to ER if CP worsens). 30 tablet 2    rosuvastatin (CRESTOR) 10 MG tablet Take 10 mg by mouth once daily.      simvastatin (ZOCOR) 20 MG tablet Take 20 mg by mouth every evening.      sumatriptan (IMITREX) 50 MG tablet 1 tab PO x1 prn. May take another tablet after 2 hours if the headache recurs. Maximum of 4 tablets a  day. 12 tablet 1    vitamin D (VITAMIN D3) 1000 units Tab Take 1,000 Units by mouth 2 (two) times a day.       No current facility-administered medications for this visit.     Review of Systems   Constitutional: Negative for activity change, fatigue and unexpected weight change.   HENT: Negative for trouble swallowing and voice change.    Eyes: Positive for visual disturbance. Negative for photophobia and pain.   Respiratory: Negative for apnea and shortness of breath.    Cardiovascular: Negative for chest pain and palpitations.   Gastrointestinal: Negative for constipation, nausea and vomiting.   Genitourinary: Negative for difficulty urinating.   Musculoskeletal: Negative for arthralgias, back pain, gait problem, myalgias and neck pain.   Skin: Negative for color change and rash.   Neurological: Positive for seizures. Negative for dizziness, syncope, speech difficulty, weakness, light-headedness, numbness and headaches.   Psychiatric/Behavioral: Negative for agitation, behavioral problems and confusion.       Neurologic Exam     Mental Status   Oriented to person, place, and time.   Registration: recalls 3 of 3 objects.   Attention: normal. Concentration: normal.   Speech: speech is normal   Level of consciousness: alert  Knowledge: good.     Cranial Nerves     CN II   Right visual field deficit: none  Left visual field deficit: none     CN III, IV, VI   Pupils are equal, round, and reactive to light.  Extraocular motions are normal.   Right pupil: Size: 3 mm. Shape: regular.   Left pupil: Size: 3 mm. Shape: regular.   CN III: no CN III palsy  CN VI: no CN VI palsy  Nystagmus: none   Diplopia: none  Ophthalmoparesis: none  Upgaze: normal  Downgaze: normal  Conjugate gaze: present    CN VII   Facial expression full, symmetric.   Right facial weakness: none  Left facial weakness: none    CN VIII   CN VIII normal.     CN XI   CN XI normal.     CN XII   CN XII normal.   Tongue deviation: none    Motor Exam   Muscle  "bulk: normal  Overall muscle tone: normal  Right arm tone: normal  Left arm tone: normal  Right leg tone: normal  Left leg tone: normal    Gait, Coordination, and Reflexes     Gait  Gait: normal    Coordination   Finger to nose coordination: normal    Tremor   Resting tremor: absent      Physical Exam  Constitutional:       Appearance: He is well-developed.   HENT:      Head: Normocephalic and atraumatic.   Eyes:      Extraocular Movements: EOM normal.      Pupils: Pupils are equal, round, and reactive to light.   Pulmonary:      Effort: Pulmonary effort is normal. No respiratory distress.   Musculoskeletal:         General: Normal range of motion.   Neurological:      Mental Status: He is alert and oriented to person, place, and time.      Coordination: Finger-Nose-Finger Test normal.      Gait: Gait is intact.   Psychiatric:         Speech: Speech normal.         Behavior: Behavior normal.         Vitals:    11/03/22 1350   BP: 123/66   Pulse: 95   Weight: 73.8 kg (162 lb 11.2 oz)   Height: 5' 4" (1.626 m)       Assessment & Plan:    Problem List Items Addressed This Visit          Neuro    Neurofibromatosis, type 1 (von Recklinghausen's disease)    Seizure    Current Assessment & Plan     - 2/2 to neurofibromatosis type 1, on keppra           Other Visit Diagnoses       Abnormal posture    -  Primary    Pain in thoracic spine        Scoliosis of thoracolumbar spine, unspecified scoliosis type        Lumbar radiculopathy, chronic                Time spent on this encounter: 35 minutes. This includes face to face time and non-face to face time preparing to see the patient (eg, review of tests), obtaining and/or reviewing separately obtained history, documenting clinical information in the electronic or other health record, independently interpreting results and communicating results to the patient/family/caregiver, or care coordinator.    "

## 2022-11-18 ENCOUNTER — LAB VISIT (OUTPATIENT)
Dept: LAB | Facility: HOSPITAL | Age: 37
End: 2022-11-18
Attending: INTERNAL MEDICINE
Payer: MEDICARE

## 2022-11-18 DIAGNOSIS — D58.2 ELEVATED HEMOGLOBIN: ICD-10-CM

## 2022-11-18 LAB
ALBUMIN SERPL BCP-MCNC: 4 G/DL (ref 3.5–5.2)
ALP SERPL-CCNC: 47 U/L (ref 55–135)
ALT SERPL W/O P-5'-P-CCNC: 18 U/L (ref 10–44)
ANION GAP SERPL CALC-SCNC: 7 MMOL/L (ref 8–16)
AST SERPL-CCNC: 15 U/L (ref 10–40)
BASOPHILS # BLD AUTO: 0.05 K/UL (ref 0–0.2)
BASOPHILS NFR BLD: 0.8 % (ref 0–1.9)
BILIRUB SERPL-MCNC: 0.5 MG/DL (ref 0.1–1)
BUN SERPL-MCNC: 14 MG/DL (ref 6–20)
CALCIUM SERPL-MCNC: 9.3 MG/DL (ref 8.7–10.5)
CHLORIDE SERPL-SCNC: 106 MMOL/L (ref 95–110)
CO2 SERPL-SCNC: 29 MMOL/L (ref 23–29)
CREAT SERPL-MCNC: 1.1 MG/DL (ref 0.5–1.4)
DIFFERENTIAL METHOD: ABNORMAL
EOSINOPHIL # BLD AUTO: 0.3 K/UL (ref 0–0.5)
EOSINOPHIL NFR BLD: 4.4 % (ref 0–8)
ERYTHROCYTE [DISTWIDTH] IN BLOOD BY AUTOMATED COUNT: 12.4 % (ref 11.5–14.5)
EST. GFR  (NO RACE VARIABLE): >60 ML/MIN/1.73 M^2
GLUCOSE SERPL-MCNC: 95 MG/DL (ref 70–110)
HCT VFR BLD AUTO: 48.8 % (ref 40–54)
HGB BLD-MCNC: 17.7 G/DL (ref 14–18)
IMM GRANULOCYTES # BLD AUTO: 0.02 K/UL (ref 0–0.04)
IMM GRANULOCYTES NFR BLD AUTO: 0.3 % (ref 0–0.5)
LYMPHOCYTES # BLD AUTO: 2.3 K/UL (ref 1–4.8)
LYMPHOCYTES NFR BLD: 35.8 % (ref 18–48)
MCH RBC QN AUTO: 31.4 PG (ref 27–31)
MCHC RBC AUTO-ENTMCNC: 36.3 G/DL (ref 32–36)
MCV RBC AUTO: 87 FL (ref 82–98)
MONOCYTES # BLD AUTO: 0.7 K/UL (ref 0.3–1)
MONOCYTES NFR BLD: 10.7 % (ref 4–15)
NEUTROPHILS # BLD AUTO: 3.1 K/UL (ref 1.8–7.7)
NEUTROPHILS NFR BLD: 48 % (ref 38–73)
NRBC BLD-RTO: 0 /100 WBC
PLATELET # BLD AUTO: 289 K/UL (ref 150–450)
PMV BLD AUTO: 10.1 FL (ref 9.2–12.9)
POTASSIUM SERPL-SCNC: 4.5 MMOL/L (ref 3.5–5.1)
PROT SERPL-MCNC: 6.9 G/DL (ref 6–8.4)
RBC # BLD AUTO: 5.63 M/UL (ref 4.6–6.2)
SODIUM SERPL-SCNC: 142 MMOL/L (ref 136–145)
WBC # BLD AUTO: 6.36 K/UL (ref 3.9–12.7)

## 2022-11-18 PROCEDURE — 36415 COLL VENOUS BLD VENIPUNCTURE: CPT | Performed by: INTERNAL MEDICINE

## 2022-11-18 PROCEDURE — 85025 COMPLETE CBC W/AUTO DIFF WBC: CPT | Performed by: INTERNAL MEDICINE

## 2022-11-18 PROCEDURE — 82668 ASSAY OF ERYTHROPOIETIN: CPT | Performed by: INTERNAL MEDICINE

## 2022-11-18 PROCEDURE — 80053 COMPREHEN METABOLIC PANEL: CPT | Performed by: INTERNAL MEDICINE

## 2022-11-21 ENCOUNTER — OFFICE VISIT (OUTPATIENT)
Dept: HEMATOLOGY/ONCOLOGY | Facility: CLINIC | Age: 37
End: 2022-11-21
Payer: MEDICARE

## 2022-11-21 DIAGNOSIS — Q85.01 NEUROFIBROMATOSIS, TYPE 1 (VON RECKLINGHAUSEN'S DISEASE): ICD-10-CM

## 2022-11-21 DIAGNOSIS — E83.10 DISORDER OF IRON METABOLISM, UNSPECIFIED: ICD-10-CM

## 2022-11-21 DIAGNOSIS — D58.2 ELEVATED HEMOGLOBIN: Primary | ICD-10-CM

## 2022-11-21 DIAGNOSIS — R56.9 SEIZURE: ICD-10-CM

## 2022-11-21 LAB — EPO SERPL-ACNC: 12.1 MIU/ML (ref 2.6–18.5)

## 2022-11-21 PROCEDURE — 99214 PR OFFICE/OUTPT VISIT, EST, LEVL IV, 30-39 MIN: ICD-10-PCS | Mod: 95,,, | Performed by: INTERNAL MEDICINE

## 2022-11-21 PROCEDURE — 99214 OFFICE O/P EST MOD 30 MIN: CPT | Mod: 95,,, | Performed by: INTERNAL MEDICINE

## 2022-11-21 NOTE — PROGRESS NOTES
The patient location is: home  The chief complaint leading to consultation is: f/u elevated HB  Visit type: audiovisual    Face to Face time with patient: 15 min  30 minutes of total time spent on the encounter, which includes face to face time and non-face to face time preparing to see the patient (eg, review of tests), Obtaining and/or reviewing separately obtained history, Documenting clinical information in the electronic or other health record, Independently interpreting results (not separately reported) and communicating results to the patient/family/caregiver, or Care coordination (not separately reported).         Each patient to whom he or she provides medical services by telemedicine is:  (1) informed of the relationship between the physician and patient and the respective role of any other health care provider with respect to management of the patient; and (2) notified that he or she may decline to receive medical services by telemedicine and may withdraw from such care at any time.    Notes:        PATIENT: Galindo Grant  MRN: [id number]  DATE: 11/23/2022      Diagnosis:   1. Elevated hemoglobin    2. Neurofibromatosis, type 1 (von Recklinghausen's disease)    3. Seizure    4. Pulmonary nodules            Chief Complaint: No chief complaint on file.    Subjective:     Interval History: Mr. Grant is a 37 y.o. male with Neurofibromatosis, arthritis, seizures who presents for follow up for elevated hematocrit.  Since the last clinci visit the patient underwent laboratory evaluation on 4/12/21.  Iron studies showed normal ferritin and TIBC.  Pt was negative for mutations with BCR/ABL, Jake2, MPL, and CALR.  EPO level was normal at 11.8mIU/mL.  Serum testosterone was normal at 822ng/mL.  The patient complains of chronic back pain from arthritis.  He complains of fatigue and occasional left sided pleuritic pain.  The patient denies cough, SOB, abdominal pain, N/V, constipation, diarrhea.  The patient  denies fever, chills, night sweats, weight loss, new lumps or bumps, easy bruising or bleeding.  He states he last used testosterone at the end of .  Hx of testosterone supp use last yr pill form O[facility] ( pt reports was on it for 2 yrs) Pt  Followed by Pulmonology for pulmonary nodules. Pt's father  of NF-associated lung tumor, per pt. 2020 and 2021 chest CTs (in [facility] system) showed unchanged small HUSAM pleural based nodule and right perifussural nodule.     Interval Hx;   Doing well  Appetite and weight stable  Non-smoker  He is f/b Pulmonology for PATRICIA   He is using asv nightly   No fatigue  Pt wakes up frequently nightime due to pain  CBC from 22 shows Hb 17.7g/dL      Pt f/b pulmonology for PATRICIA and pulmonary nodules       Past Medical History:   Past Medical History:   Diagnosis Date    Arthritis     in knee    Dysphagia, oropharyngeal 2016    Headache(784.0)     Irregular heart beat     Mass of larynx 10/2/2012    Neurofibromatosis syndrome     Neurofibromatosis, type 1 (von Recklinghausen's disease) birth    Neurofibromatosis, type 1 (von Recklinghausen's disease) 2012    Seizure 2022       Past Surgical HIstory:   Past Surgical History:   Procedure Laterality Date    ARM DEBRIDEMENT      NF - right    partial supraglottic laryngectomy  6/15/2012    REMOVAL OF SACRAL NEUROSTIMULATOR DEVICE N/A 3/30/2022    Procedure: REMOVAL, NEUROSTIMULATOR, SACRAL;  Surgeon: KIP Shipley MD;  Location: [facility] OR;  Service: Urology;  Laterality: N/A;  FLUORO NEEDED  RN Pre OP, Covid screen 3-29-22.  C A    REPLACEMENT OF SACRAL NERVE STIMULATOR N/A 12/10/2021    Procedure: REPLACEMENT, NEUROSTIMULATOR, SACRAL;  Surgeon: KIP Shipley MD;  Location: [facility] OR;  Service: Urology;  Laterality: N/A;  [employer]   SARAH [phone] WILL BE HERE FOR THE CASE PER HANANE ON 2021 WILL BE HERE  RN Pre Op 21.  ---COVID NEGATIVE ON ----- C A  IMPLANTS  ORDERED ON 12-1-2021       Family History:   Family History   Problem Relation Age of Onset    Cancer Father         neuro fibroma mitosis    Cancer Paternal Uncle         neuro fibroma mitosis    Cancer Cousin         neuro fibroma mitosis       Social History:  reports that he has quit smoking. He has quit using smokeless tobacco. He reports that he does not drink alcohol and does not use drugs.    Allergies:  Review of patient's allergies indicates:  No Known Allergies    Medications:  Current Outpatient Medications   Medication Sig Dispense Refill    acetaminophen (TYLENOL) 500 MG tablet Take 1 tablet (500 mg total) by mouth every 6 (six) hours as needed for Pain. 30 tablet 0    albuterol (PROVENTIL/VENTOLIN HFA) 90 mcg/actuation inhaler Inhale into the lungs.      atorvastatin (LIPITOR) 10 MG tablet Take 1 tablet (10 mg total) by mouth once daily. 90 tablet 1    bacitracin 500 unit/gram Oint Apply topically 2 (two) times daily. 30 g 0    baclofen (LIORESAL) 10 MG tablet Take 1 tablet (10 mg total) by mouth 3 (three) times daily. 90 tablet 11    calcium-vitamin D3 (CALCIUM 500 + D) 500 mg(1,250mg) -200 unit per tablet Take 1 tablet by mouth 2 (two) times daily with meals. 180 tablet 3    cyanocobalamin (VITAMIN B-12) 1000 MCG tablet Take 100 mcg by mouth 2 (two) times a day.      cyclobenzaprine (FLEXERIL) 5 MG tablet Take 1 tablet (5 mg total) by mouth nightly as needed for Muscle spasms. 90 tablet 1    diclofenac sodium (VOLTAREN) 1 % Gel Apply 2 g topically 4 (four) times daily. 100 g 5    FLUoxetine 20 MG capsule Take 1 capsule (20 mg total) by mouth once daily. 90 capsule 3    HYDROcodone-acetaminophen (NORCO) 7.5-325 mg per tablet Take 1 tablet by mouth every 6 (six) hours as needed for Pain. 28 tablet 0    hydrOXYzine HCl (ATARAX) 25 MG tablet Take 1 tablet (25 mg total) by mouth 3 (three) times daily. DO NOT DRIVE AFTER TAKING MED 90 tablet 5    ibuprofen (ADVIL,MOTRIN) 800 MG tablet Take 1 tablet (800  mg total) by mouth every 6 (six) hours as needed for Pain. 20 tablet 0    levETIRAcetam (KEPPRA) 500 MG Tab Take 2 tablets (1,000 mg total) by mouth 2 (two) times daily. 360 tablet 3    levothyroxine sodium (LEVOTHYROXINE ORAL) Take by mouth.      metoprolol tartrate (LOPRESSOR) 25 MG tablet Take 0.5 tablets (12.5 mg total) by mouth 2 (two) times daily. 90 tablet 1    mupirocin (BACTROBAN) 2 % ointment Apply topically 3 (three) times daily. 30 g 0    nitroGLYCERIN (NITROSTAT) 0.4 MG SL tablet Place 1 tablet (0.4 mg total) under the tongue every 5 (five) minutes as needed for Chest pain (Repeat in 5 min if CP persists. Go to ER if CP worsens). 30 tablet 2    rosuvastatin (CRESTOR) 10 MG tablet Take 10 mg by mouth once daily.      simvastatin (ZOCOR) 20 MG tablet Take 20 mg by mouth every evening.      sumatriptan (IMITREX) 50 MG tablet 1 tab PO x1 prn. May take another tablet after 2 hours if the headache recurs. Maximum of 4 tablets a day. 12 tablet 1    vitamin D (VITAMIN D3) 1000 units Tab Take 1,000 Units by mouth 2 (two) times a day.       No current facility-administered medications for this visit.       Review of Systems   Constitutional:  Positive for fatigue. Negative for chills, diaphoresis, fever and unexpected weight change.   Respiratory:  Negative for cough and shortness of breath.    Cardiovascular:  Negative for chest pain and palpitations.   Gastrointestinal:  Negative for abdominal pain, constipation, diarrhea, nausea and vomiting.   Musculoskeletal:  Positive for back pain.   Skin:  Negative for color change and rash.   Neurological:  Negative for headaches.   Hematological:  Negative for adenopathy. Does not bruise/bleed easily.     ECOG Performance Status: 0   Objective:      Vitals:   There were no vitals filed for this visit.    televist      Laboratory Data:  Lab Visit on 11/18/2022   Component Date Value Ref Range Status    WBC 11/18/2022 6.36  3.90 - 12.70 K/uL Final    RBC 11/18/2022 5.63   4.60 - 6.20 M/uL Final    Hemoglobin 11/18/2022 17.7  14.0 - 18.0 g/dL Final    Hematocrit 11/18/2022 48.8  40.0 - 54.0 % Final    MCV 11/18/2022 87  82 - 98 fL Final    MCH 11/18/2022 31.4 (H)  27.0 - 31.0 pg Final    MCHC 11/18/2022 36.3 (H)  32.0 - 36.0 g/dL Final    RDW 11/18/2022 12.4  11.5 - 14.5 % Final    Platelets 11/18/2022 289  150 - 450 K/uL Final    MPV 11/18/2022 10.1  9.2 - 12.9 fL Final    Immature Granulocytes 11/18/2022 0.3  0.0 - 0.5 % Final    Gran # (ANC) 11/18/2022 3.1  1.8 - 7.7 K/uL Final    Immature Grans (Abs) 11/18/2022 0.02  0.00 - 0.04 K/uL Final    Comment: Mild elevation in immature granulocytes is non specific and   can be seen in a variety of conditions including stress response,   acute inflammation, trauma and pregnancy. Correlation with other   laboratory and clinical findings is essential.      Lymph # 11/18/2022 2.3  1.0 - 4.8 K/uL Final    Mono # 11/18/2022 0.7  0.3 - 1.0 K/uL Final    Eos # 11/18/2022 0.3  0.0 - 0.5 K/uL Final    Baso # 11/18/2022 0.05  0.00 - 0.20 K/uL Final    nRBC 11/18/2022 0  0 /100 WBC Final    Gran % 11/18/2022 48.0  38.0 - 73.0 % Final    Lymph % 11/18/2022 35.8  18.0 - 48.0 % Final    Mono % 11/18/2022 10.7  4.0 - 15.0 % Final    Eosinophil % 11/18/2022 4.4  0.0 - 8.0 % Final    Basophil % 11/18/2022 0.8  0.0 - 1.9 % Final    Differential Method 11/18/2022 Automated   Final    Sodium 11/18/2022 142  136 - 145 mmol/L Final    Potassium 11/18/2022 4.5  3.5 - 5.1 mmol/L Final    Chloride 11/18/2022 106  95 - 110 mmol/L Final    CO2 11/18/2022 29  23 - 29 mmol/L Final    Glucose 11/18/2022 95  70 - 110 mg/dL Final    BUN 11/18/2022 14  6 - 20 mg/dL Final    Creatinine 11/18/2022 1.1  0.5 - 1.4 mg/dL Final    Calcium 11/18/2022 9.3  8.7 - 10.5 mg/dL Final    Total Protein 11/18/2022 6.9  6.0 - 8.4 g/dL Final    Albumin 11/18/2022 4.0  3.5 - 5.2 g/dL Final    Total Bilirubin 11/18/2022 0.5  0.1 - 1.0 mg/dL Final    Comment: For infants and newborns,  interpretation of results should be based  on gestational age, weight and in agreement with clinical  observations.    Premature Infant recommended reference ranges:  Up to 24 hours.............<8.0 mg/dL  Up to 48 hours............<12.0 mg/dL  3-5 days..................<15.0 mg/dL  6-29 days.................<15.0 mg/dL      Alkaline Phosphatase 11/18/2022 47 (L)  55 - 135 U/L Final    AST 11/18/2022 15  10 - 40 U/L Final    ALT 11/18/2022 18  10 - 44 U/L Final    Anion Gap 11/18/2022 7 (L)  8 - 16 mmol/L Final    eGFR 11/18/2022 >60  >60 mL/min/1.73 m^2 Final    Erythropoietin 11/18/2022 12.1  2.6 - 18.5 mIU/mL Final    Comment: Test performed at [facility],  [address] , [place]  [address]     [phone]  Tana Merrill MD, PhD - Medical Director           Imaging: Reviewed       IMPRESSION:   Stable bilateral pulmonary nodules.        Assessment:       1. Elevated hemoglobin    2. Disorder of iron metabolism, unspecified    3. Neurofibromatosis, type 1 (von Recklinghausen's disease)    4. Seizure             Plan:     Elevated Hematocrit - The patient has 2 hemoglobin values in the last 3 years which were above the threshold of 16.5 to cause concern for polycythemia  -Hemoglobin on 7/22/20 was 18.7g/dL  -Repeat hemoglobin on 4/17/21 was 17g/dL  -The patient has been using testosterone on a regular basis  -His testosterone use may be contributing to his elevated hemoglobin values  -Pt again instructed to avoid testosterone use and that elevated hemoglobin values can predispose to heart disease and strokes  - lab work on 4/12/21 showed normal ferritin and TIBC. Pt was negative for mutations with BCR/ABL, Jake2 V617F, MPL, and CALR.  EPO level was normal at 11.8mIU/mL.  Serum testosterone was normal at 822ng/mL  - Jak2 Exon 12 mutationNegative. No pathogenic genetic alterations were detected in  JAK2 , exons 12-15  Negative results for [id number], CALR exon 9, and MPL exon 10   -hemoglobin  17.7 g/dL , stable  -l secondary to PATRICIA  asympttomatic  Cont to monitor counts      Neurofibromatosis - PT is being followed by Dr Otero in Neurology  -PT at increased risk of neural sheath tumors.    Seizure - pt states he has not had seizures in a long time  -Management per neurology      Pulmonary Nodules  F/b pulmonology   Small nodules unchnaged over 6 months between August 2020 and February 2021.  CT chest  6/13/22  Lung-RADS Category:  1-benign appearance           CBC,EPO, Fe studies  prior to f/u  in 3mos

## 2023-02-06 ENCOUNTER — LAB VISIT (OUTPATIENT)
Dept: LAB | Facility: HOSPITAL | Age: 38
End: 2023-02-06
Attending: INTERNAL MEDICINE
Payer: MEDICARE

## 2023-02-06 DIAGNOSIS — E83.10 DISORDER OF IRON METABOLISM, UNSPECIFIED: ICD-10-CM

## 2023-02-06 DIAGNOSIS — D58.2 ELEVATED HEMOGLOBIN: ICD-10-CM

## 2023-02-06 LAB
BASOPHILS # BLD AUTO: 0.05 K/UL (ref 0–0.2)
BASOPHILS NFR BLD: 0.8 % (ref 0–1.9)
DIFFERENTIAL METHOD: NORMAL
EOSINOPHIL # BLD AUTO: 0.2 K/UL (ref 0–0.5)
EOSINOPHIL NFR BLD: 3.5 % (ref 0–8)
ERYTHROCYTE [DISTWIDTH] IN BLOOD BY AUTOMATED COUNT: 12.2 % (ref 11.5–14.5)
FERRITIN SERPL-MCNC: 65 NG/ML (ref 20–300)
HCT VFR BLD AUTO: 48 % (ref 40–54)
HGB BLD-MCNC: 16 G/DL (ref 14–18)
IMM GRANULOCYTES # BLD AUTO: 0.01 K/UL (ref 0–0.04)
IMM GRANULOCYTES NFR BLD AUTO: 0.2 % (ref 0–0.5)
IRON SERPL-MCNC: 111 UG/DL (ref 45–160)
LYMPHOCYTES # BLD AUTO: 1.7 K/UL (ref 1–4.8)
LYMPHOCYTES NFR BLD: 27.8 % (ref 18–48)
MCH RBC QN AUTO: 29.3 PG (ref 27–31)
MCHC RBC AUTO-ENTMCNC: 33.3 G/DL (ref 32–36)
MCV RBC AUTO: 88 FL (ref 82–98)
MONOCYTES # BLD AUTO: 0.6 K/UL (ref 0.3–1)
MONOCYTES NFR BLD: 10.2 % (ref 4–15)
NEUTROPHILS # BLD AUTO: 3.5 K/UL (ref 1.8–7.7)
NEUTROPHILS NFR BLD: 57.5 % (ref 38–73)
NRBC BLD-RTO: 0 /100 WBC
PLATELET # BLD AUTO: 258 K/UL (ref 150–450)
PMV BLD AUTO: 9.9 FL (ref 9.2–12.9)
RBC # BLD AUTO: 5.47 M/UL (ref 4.6–6.2)
SATURATED IRON: 40 % (ref 20–50)
TOTAL IRON BINDING CAPACITY: 277 UG/DL (ref 250–450)
TRANSFERRIN SERPL-MCNC: 187 MG/DL (ref 200–375)
WBC # BLD AUTO: 6.07 K/UL (ref 3.9–12.7)

## 2023-02-06 PROCEDURE — 36415 COLL VENOUS BLD VENIPUNCTURE: CPT | Performed by: INTERNAL MEDICINE

## 2023-02-06 PROCEDURE — 82728 ASSAY OF FERRITIN: CPT | Performed by: INTERNAL MEDICINE

## 2023-02-06 PROCEDURE — 84466 ASSAY OF TRANSFERRIN: CPT | Performed by: INTERNAL MEDICINE

## 2023-02-06 PROCEDURE — 82668 ASSAY OF ERYTHROPOIETIN: CPT | Performed by: INTERNAL MEDICINE

## 2023-02-06 PROCEDURE — 85025 COMPLETE CBC W/AUTO DIFF WBC: CPT | Performed by: INTERNAL MEDICINE

## 2023-03-24 DIAGNOSIS — D58.2 ELEVATED HEMOGLOBIN: Primary | ICD-10-CM

## 2023-03-24 DIAGNOSIS — E83.10 DISORDER OF IRON METABOLISM, UNSPECIFIED: ICD-10-CM

## 2023-03-31 ENCOUNTER — LAB VISIT (OUTPATIENT)
Dept: LAB | Facility: HOSPITAL | Age: 38
End: 2023-03-31
Attending: INTERNAL MEDICINE
Payer: MEDICARE

## 2023-03-31 DIAGNOSIS — D58.2 ELEVATED HEMOGLOBIN: ICD-10-CM

## 2023-03-31 DIAGNOSIS — E83.10 DISORDER OF IRON METABOLISM, UNSPECIFIED: ICD-10-CM

## 2023-03-31 LAB
BASOPHILS # BLD AUTO: 0.06 K/UL (ref 0–0.2)
BASOPHILS NFR BLD: 1.1 % (ref 0–1.9)
DIFFERENTIAL METHOD: NORMAL
EOSINOPHIL # BLD AUTO: 0.3 K/UL (ref 0–0.5)
EOSINOPHIL NFR BLD: 5 % (ref 0–8)
ERYTHROCYTE [DISTWIDTH] IN BLOOD BY AUTOMATED COUNT: 12.1 % (ref 11.5–14.5)
FERRITIN SERPL-MCNC: 60 NG/ML (ref 20–300)
HCT VFR BLD AUTO: 46.8 % (ref 40–54)
HGB BLD-MCNC: 16 G/DL (ref 14–18)
IMM GRANULOCYTES # BLD AUTO: 0.01 K/UL (ref 0–0.04)
IMM GRANULOCYTES NFR BLD AUTO: 0.2 % (ref 0–0.5)
IRON SERPL-MCNC: 81 UG/DL (ref 45–160)
IRON SERPL-MCNC: 81 UG/DL (ref 45–160)
LYMPHOCYTES # BLD AUTO: 2 K/UL (ref 1–4.8)
LYMPHOCYTES NFR BLD: 37.7 % (ref 18–48)
MCH RBC QN AUTO: 29.6 PG (ref 27–31)
MCHC RBC AUTO-ENTMCNC: 34.2 G/DL (ref 32–36)
MCV RBC AUTO: 87 FL (ref 82–98)
MONOCYTES # BLD AUTO: 0.5 K/UL (ref 0.3–1)
MONOCYTES NFR BLD: 10 % (ref 4–15)
NEUTROPHILS # BLD AUTO: 2.5 K/UL (ref 1.8–7.7)
NEUTROPHILS NFR BLD: 46 % (ref 38–73)
NRBC BLD-RTO: 0 /100 WBC
PLATELET # BLD AUTO: 229 K/UL (ref 150–450)
PMV BLD AUTO: 9.5 FL (ref 9.2–12.9)
RBC # BLD AUTO: 5.41 M/UL (ref 4.6–6.2)
SATURATED IRON: 29 % (ref 20–50)
TOTAL IRON BINDING CAPACITY: 275 UG/DL (ref 250–450)
TRANSFERRIN SERPL-MCNC: 186 MG/DL (ref 200–375)
WBC # BLD AUTO: 5.38 K/UL (ref 3.9–12.7)

## 2023-03-31 PROCEDURE — 82668 ASSAY OF ERYTHROPOIETIN: CPT | Performed by: INTERNAL MEDICINE

## 2023-03-31 PROCEDURE — 82728 ASSAY OF FERRITIN: CPT | Performed by: INTERNAL MEDICINE

## 2023-03-31 PROCEDURE — 84466 ASSAY OF TRANSFERRIN: CPT | Performed by: INTERNAL MEDICINE

## 2023-03-31 PROCEDURE — 85025 COMPLETE CBC W/AUTO DIFF WBC: CPT | Performed by: INTERNAL MEDICINE

## 2023-04-03 ENCOUNTER — OFFICE VISIT (OUTPATIENT)
Dept: HEMATOLOGY/ONCOLOGY | Facility: CLINIC | Age: 38
End: 2023-04-03
Payer: MEDICARE

## 2023-04-03 VITALS
HEIGHT: 64 IN | OXYGEN SATURATION: 95 % | DIASTOLIC BLOOD PRESSURE: 76 MMHG | SYSTOLIC BLOOD PRESSURE: 122 MMHG | HEART RATE: 89 BPM | WEIGHT: 164.44 LBS | BODY MASS INDEX: 28.07 KG/M2

## 2023-04-03 DIAGNOSIS — R91.8 PULMONARY NODULES: ICD-10-CM

## 2023-04-03 DIAGNOSIS — Z87.898 HISTORY OF SEIZURE: ICD-10-CM

## 2023-04-03 DIAGNOSIS — Q85.01 NEUROFIBROMATOSIS, TYPE 1 (VON RECKLINGHAUSEN'S DISEASE): ICD-10-CM

## 2023-04-03 DIAGNOSIS — G47.31 COMPLEX SLEEP APNEA SYNDROME: ICD-10-CM

## 2023-04-03 DIAGNOSIS — D58.2 ELEVATED HEMOGLOBIN: Primary | ICD-10-CM

## 2023-04-03 DIAGNOSIS — G47.33 OSA (OBSTRUCTIVE SLEEP APNEA): ICD-10-CM

## 2023-04-03 LAB — EPO SERPL-ACNC: 11.8 MIU/ML (ref 2.6–18.5)

## 2023-04-03 PROCEDURE — 3074F SYST BP LT 130 MM HG: CPT | Mod: CPTII,S$GLB,, | Performed by: INTERNAL MEDICINE

## 2023-04-03 PROCEDURE — 1159F PR MEDICATION LIST DOCUMENTED IN MEDICAL RECORD: ICD-10-PCS | Mod: CPTII,S$GLB,, | Performed by: INTERNAL MEDICINE

## 2023-04-03 PROCEDURE — 99999 PR PBB SHADOW E&M-EST. PATIENT-LVL III: ICD-10-PCS | Mod: PBBFAC,,, | Performed by: INTERNAL MEDICINE

## 2023-04-03 PROCEDURE — 99214 OFFICE O/P EST MOD 30 MIN: CPT | Mod: S$GLB,,, | Performed by: INTERNAL MEDICINE

## 2023-04-03 PROCEDURE — 99214 PR OFFICE/OUTPT VISIT, EST, LEVL IV, 30-39 MIN: ICD-10-PCS | Mod: S$GLB,,, | Performed by: INTERNAL MEDICINE

## 2023-04-03 PROCEDURE — 3008F BODY MASS INDEX DOCD: CPT | Mod: CPTII,S$GLB,, | Performed by: INTERNAL MEDICINE

## 2023-04-03 PROCEDURE — 99999 PR PBB SHADOW E&M-EST. PATIENT-LVL III: CPT | Mod: PBBFAC,,, | Performed by: INTERNAL MEDICINE

## 2023-04-03 PROCEDURE — 3078F DIAST BP <80 MM HG: CPT | Mod: CPTII,S$GLB,, | Performed by: INTERNAL MEDICINE

## 2023-04-03 PROCEDURE — 3078F PR MOST RECENT DIASTOLIC BLOOD PRESSURE < 80 MM HG: ICD-10-PCS | Mod: CPTII,S$GLB,, | Performed by: INTERNAL MEDICINE

## 2023-04-03 PROCEDURE — 3074F PR MOST RECENT SYSTOLIC BLOOD PRESSURE < 130 MM HG: ICD-10-PCS | Mod: CPTII,S$GLB,, | Performed by: INTERNAL MEDICINE

## 2023-04-03 PROCEDURE — 1159F MED LIST DOCD IN RCRD: CPT | Mod: CPTII,S$GLB,, | Performed by: INTERNAL MEDICINE

## 2023-04-03 PROCEDURE — 3008F PR BODY MASS INDEX (BMI) DOCUMENTED: ICD-10-PCS | Mod: CPTII,S$GLB,, | Performed by: INTERNAL MEDICINE

## 2023-04-03 NOTE — PROGRESS NOTES
PATIENT: Galindo Grant  MRN: [id number]  DATE: 4/3/2023      Diagnosis:   1. Elevated hemoglobin    2. Neurofibromatosis, type 1 (von Recklinghausen's disease)    3. Seizure    4. Pulmonary nodules            Chief Complaint: No chief complaint on file.      Subjective:     Interval History: Mr. Grant is a 37 y.o. male with Neurofibromatosis, arthritis, seizures who presents for follow up for elevated hematocrit.  Since the last clinci visit the patient underwent laboratory evaluation on 21.  Iron studies showed normal ferritin and TIBC.  Pt was negative for mutations with BCR/ABL, Jake2, MPL, and CALR.  EPO level was normal at 11.8mIU/mL.  Serum testosterone was normal at 822ng/mL.  The patient complains of chronic back pain from arthritis.  He complains of fatigue and occasional left sided pleuritic pain.  The patient denies cough, SOB, abdominal pain, N/V, constipation, diarrhea.  The patient denies fever, chills, night sweats, weight loss, new lumps or bumps, easy bruising or bleeding.  He states he last used testosterone at the end of .  Hx of testosterone supp use last yr [facility] ( pt reports was on it for 2 yrs) Pt  Followed by Pulmonology for pulmonary nodules. Pt's father  of NF-associated lung tumor, per pt. 2020 and 2021 chest CTs (in [facility] system) showed unchanged small HUSAM pleural based nodule and right perifussural nodule.     Interval Hx;   Doing well  Appetite and weight stable  Non-smoker  He is f/b Pulmonology for PATRICIA   He is using asv nightly   No fatigue  Pt wakes up frequently nightime due to pain  CBC from 22 shows Hb 17.7g/dL      Pt f/b pulmonology for PATRICIA and pulmonary nodules       Past Medical History:   Past Medical History:   Diagnosis Date    Arthritis     in knee    Dysphagia, oropharyngeal 2016    Headache(784.0)     Irregular heart beat     Mass of larynx 10/2/2012    Neurofibromatosis syndrome      Neurofibromatosis, type 1 (von Recklinghausen's disease) birth    Neurofibromatosis, type 1 (von Recklinghausen's disease) 7/9/2012    Seizure 11/16/2022       Past Surgical HIstory:   Past Surgical History:   Procedure Laterality Date    ARM DEBRIDEMENT      NF - right    partial supraglottic laryngectomy  6/15/2012    REMOVAL OF SACRAL NEUROSTIMULATOR DEVICE N/A 3/30/2022    Procedure: REMOVAL, NEUROSTIMULATOR, SACRAL;  Surgeon: KIP Shipley MD;  Location: [facility] OR;  Service: Urology;  Laterality: N/A;  FLUORO NEEDED  RN Pre OP, Covid screen 3-29-22.  C A    REPLACEMENT OF SACRAL NERVE STIMULATOR N/A 12/10/2021    Procedure: REPLACEMENT, NEUROSTIMULATOR, SACRAL;  Surgeon: KIP Shipley MD;  Location: [facility] OR;  Service: Urology;  Laterality: N/A;  [employer]  [facility] SARAH [phone] WILL BE HERE FOR THE CASE PER HANANE ON 12-1-2021 WILL BE HERE  RN Pre Op 11-23-21.  ---COVID NEGATIVE ON 12/8----- C A  IMPLANTS ORDERED ON 12-1-2021       Family History:   Family History   Problem Relation Age of Onset    Cancer Father         neuro fibroma mitosis    Cancer Paternal Uncle         neuro fibroma mitosis    Cancer Cousin         neuro fibroma mitosis       Social History:  reports that he has quit smoking. He has quit using smokeless tobacco. He reports that he does not drink alcohol and does not use drugs.    Allergies:  Review of patient's allergies indicates:  No Known Allergies    Medications:  Current Outpatient Medications   Medication Sig Dispense Refill    acetaminophen (TYLENOL) 500 MG tablet Take 1 tablet (500 mg total) by mouth every 6 (six) hours as needed for Pain. 30 tablet 0    albuterol (PROVENTIL/VENTOLIN HFA) 90 mcg/actuation inhaler Inhale into the lungs.      atorvastatin (LIPITOR) 10 MG tablet Take 1 tablet (10 mg total) by mouth once daily. 90 tablet 1    bacitracin 500 unit/gram Oint Apply topically 2 (two) times daily. 30 g 0    baclofen (LIORESAL) 10 MG tablet Take 1 tablet (10 mg total)  by mouth 3 (three) times daily. 90 tablet 11    calcium-vitamin D3 (CALCIUM 500 + D) 500 mg(1,250mg) -200 unit per tablet Take 1 tablet by mouth 2 (two) times daily with meals. 180 tablet 3    cyanocobalamin (VITAMIN B-12) 1000 MCG tablet Take 100 mcg by mouth 2 (two) times a day.      cyclobenzaprine (FLEXERIL) 5 MG tablet Take 1 tablet (5 mg total) by mouth nightly as needed for Muscle spasms. 90 tablet 1    FLUoxetine 20 MG capsule Take 1 capsule (20 mg total) by mouth once daily. 90 capsule 3    HYDROcodone-acetaminophen (NORCO) 7.5-325 mg per tablet Take 1 tablet by mouth every 6 (six) hours as needed for Pain. 28 tablet 0    hydrOXYzine HCl (ATARAX) 25 MG tablet Take 1 tablet (25 mg total) by mouth 3 (three) times daily. DO NOT DRIVE AFTER TAKING MED 90 tablet 5    ibuprofen (ADVIL,MOTRIN) 800 MG tablet Take 1 tablet (800 mg total) by mouth every 6 (six) hours as needed for Pain. 20 tablet 0    levothyroxine sodium (LEVOTHYROXINE ORAL) Take by mouth.      metoprolol tartrate (LOPRESSOR) 25 MG tablet Take 0.5 tablets (12.5 mg total) by mouth 2 (two) times daily. 90 tablet 1    mupirocin (BACTROBAN) 2 % ointment Apply topically 3 (three) times daily. 30 g 0    nitroGLYCERIN (NITROSTAT) 0.4 MG SL tablet Place 1 tablet (0.4 mg total) under the tongue every 5 (five) minutes as needed for Chest pain (Repeat in 5 min if CP persists. Go to ER if CP worsens). 30 tablet 2    rosuvastatin (CRESTOR) 10 MG tablet Take 10 mg by mouth once daily.      simvastatin (ZOCOR) 20 MG tablet Take 20 mg by mouth every evening.      sumatriptan (IMITREX) 50 MG tablet 1 tab PO x1 prn. May take another tablet after 2 hours if the headache recurs. Maximum of 4 tablets a day. 12 tablet 1    vitamin D (VITAMIN D3) 1000 units Tab Take 1,000 Units by mouth 2 (two) times a day.      diclofenac sodium (VOLTAREN) 1 % Gel Apply 2 g topically 4 (four) times daily. 100 g 5    levETIRAcetam (KEPPRA) 500 MG Tab Take 2 tablets (1,000 mg total) by  "mouth 2 (two) times daily. 360 tablet 3     No current facility-administered medications for this visit.       Review of Systems   Constitutional:  Positive for fatigue. Negative for chills, diaphoresis, fever and unexpected weight change.   Respiratory:  Negative for cough and shortness of breath.    Cardiovascular:  Negative for chest pain and palpitations.   Gastrointestinal:  Negative for abdominal pain, constipation, diarrhea, nausea and vomiting.   Musculoskeletal:  Positive for back pain.   Skin:  Negative for color change and rash.   Neurological:  Negative for headaches.   Hematological:  Negative for adenopathy. Does not bruise/bleed easily.     ECOG Performance Status: 0   Objective:      Vitals:   Vitals:    04/03/23 1506   BP: 122/76   BP Location: Right arm   Patient Position: Sitting   BP Method: Large (Automatic)   Pulse: 89   SpO2: 95%   Weight: 74.6 kg (164 lb 7.4 oz)   Height: 5' 4" (1.626 m)       Physical Exam   Constitutional: He is oriented to person, place, and time. He appears well-developed and well-nourished.   HENT:   Head: Normocephalic.   Eyes: No scleral icterus.   Neck: Normal range of motion. Neck supple. No thyromegaly present.   Cardiovascular: Normal rate, regular rhythm and normal heart sounds.    No murmur heard.  Pulmonary/Chest: Effort normal and breath sounds normal. He has no wheezes. He has no rales.   Abdominal: Soft. Bowel sounds are normal. He exhibits no distension. There is no tenderness. There is no rebound and no guarding.   Musculoskeletal: Normal range of motion. He exhibits no edema.   Lymphadenopathy:     He has no cervical adenopathy.     He has no axillary adenopathy.        Right: No supraclavicular adenopathy present.        Left: No supraclavicular adenopathy present.   Neurological: He is alert and oriented to person, place, and time. No cranial nerve deficit.   Skin: No rash noted. No erythema.   Psychiatric: He has a normal mood and affect.    "       Laboratory Data:  Lab Visit on 03/31/2023   Component Date Value Ref Range Status    WBC 03/31/2023 5.38  3.90 - 12.70 K/uL Final    RBC 03/31/2023 5.41  4.60 - 6.20 M/uL Final    Hemoglobin 03/31/2023 16.0  14.0 - 18.0 g/dL Final    Hematocrit 03/31/2023 46.8  40.0 - 54.0 % Final    MCV 03/31/2023 87  82 - 98 fL Final    MCH 03/31/2023 29.6  27.0 - 31.0 pg Final    MCHC 03/31/2023 34.2  32.0 - 36.0 g/dL Final    RDW 03/31/2023 12.1  11.5 - 14.5 % Final    Platelets 03/31/2023 229  150 - 450 K/uL Final    MPV 03/31/2023 9.5  9.2 - 12.9 fL Final    Immature Granulocytes 03/31/2023 0.2  0.0 - 0.5 % Final    Gran # (ANC) 03/31/2023 2.5  1.8 - 7.7 K/uL Final    Immature Grans (Abs) 03/31/2023 0.01  0.00 - 0.04 K/uL Final    Comment: Mild elevation in immature granulocytes is non specific and   can be seen in a variety of conditions including stress response,   acute inflammation, trauma and pregnancy. Correlation with other   laboratory and clinical findings is essential.      Lymph # 03/31/2023 2.0  1.0 - 4.8 K/uL Final    Mono # 03/31/2023 0.5  0.3 - 1.0 K/uL Final    Eos # 03/31/2023 0.3  0.0 - 0.5 K/uL Final    Baso # 03/31/2023 0.06  0.00 - 0.20 K/uL Final    nRBC 03/31/2023 0  0 /100 WBC Final    Gran % 03/31/2023 46.0  38.0 - 73.0 % Final    Lymph % 03/31/2023 37.7  18.0 - 48.0 % Final    Mono % 03/31/2023 10.0  4.0 - 15.0 % Final    Eosinophil % 03/31/2023 5.0  0.0 - 8.0 % Final    Basophil % 03/31/2023 1.1  0.0 - 1.9 % Final    Differential Method 03/31/2023 Automated   Final    Erythropoietin 03/31/2023 11.8  2.6 - 18.5 mIU/mL Final    Comment: Test performed at [facility],  [address], [place]  [address]     [phone]  Tana Merrill MD, PhD - Medical Director      Ferritin 03/31/2023 60  20.0 - 300.0 ng/mL Final    Iron 03/31/2023 81  45 - 160 ug/dL Final    Iron 03/31/2023 81  45 - 160 ug/dL Final    Transferrin 03/31/2023 186 (L)  200 - 375 mg/dL Final    TIBC 03/31/2023  275  250 - 450 ug/dL Final    Saturated Iron 03/31/2023 29  20 - 50 % Final         Imaging: Reviewed       IMPRESSION:   Stable bilateral pulmonary nodules.        Assessment:       1. Elevated hemoglobin    2. Disorder of iron metabolism, unspecified    3. Neurofibromatosis, type 1 (von Recklinghausen's disease)               Plan:     Elevated Hematocrit - The patient has 2 hemoglobin values in the last 3 years which were above the threshold of 16.5 to cause concern for polycythemia  -Hemoglobin on 7/22/20 was 18.7g/dL  -Repeat hemoglobin on 4/17/21 was 17g/dL  -The patient has been using testosterone on a regular basis  -His testosterone use may be contributing to his elevated hemoglobin values  -Pt again instructed to avoid testosterone use and that elevated hemoglobin values can predispose to heart disease and strokes  - lab work on 4/12/21 showed normal ferritin and TIBC. Pt was negative for mutations with BCR/ABL, Jake2 V617F, MPL, and CALR.  EPO level was normal at 11.8mIU/mL.  Serum testosterone was normal at 822ng/mL  - Jak2 Exon 12 mutationNegative. No pathogenic genetic alterations were detected in  JAK2 , exons 12-15  Negative results for [id number], CALR exon 9, and MPL exon 10   -hemoglobin 16 g/dL , stable  -l secondary to PATRICIA  asympttomatic  Cont to monitor counts      Neurofibromatosis - PT is being followed by Dr Otero in Neurology  -PT at increased risk of neural sheath tumors.    Hx of Seizure - pt states he has not had seizures in a long time  -Management per neurology      Pulmonary Nodules  F/b pulmonology   Small nodules unchnaged over 6 months between August 2020 and February 2021.  CT chest  6/13/22  Lung-RADS Category:  1-benign appearance           CBC,EPO, cmp  prior to f/u  in 3mos

## 2023-04-24 ENCOUNTER — OFFICE VISIT (OUTPATIENT)
Dept: NEUROLOGY | Facility: CLINIC | Age: 38
End: 2023-04-24
Payer: MEDICARE

## 2023-04-24 VITALS — DIASTOLIC BLOOD PRESSURE: 72 MMHG | SYSTOLIC BLOOD PRESSURE: 124 MMHG

## 2023-04-24 DIAGNOSIS — R56.9 SEIZURE: ICD-10-CM

## 2023-04-24 DIAGNOSIS — M54.50 CHRONIC BILATERAL LOW BACK PAIN, UNSPECIFIED WHETHER SCIATICA PRESENT: Primary | ICD-10-CM

## 2023-04-24 DIAGNOSIS — G89.29 CHRONIC BILATERAL LOW BACK PAIN, UNSPECIFIED WHETHER SCIATICA PRESENT: Primary | ICD-10-CM

## 2023-04-24 DIAGNOSIS — Q85.01 NEUROFIBROMATOSIS, TYPE 1 (VON RECKLINGHAUSEN'S DISEASE): ICD-10-CM

## 2023-04-24 DIAGNOSIS — R25.1 EPISODE OF SHAKING: ICD-10-CM

## 2023-04-24 PROCEDURE — 3078F DIAST BP <80 MM HG: CPT | Mod: CPTII,S$GLB,, | Performed by: STUDENT IN AN ORGANIZED HEALTH CARE EDUCATION/TRAINING PROGRAM

## 2023-04-24 PROCEDURE — 3078F PR MOST RECENT DIASTOLIC BLOOD PRESSURE < 80 MM HG: ICD-10-PCS | Mod: CPTII,S$GLB,, | Performed by: STUDENT IN AN ORGANIZED HEALTH CARE EDUCATION/TRAINING PROGRAM

## 2023-04-24 PROCEDURE — 3074F PR MOST RECENT SYSTOLIC BLOOD PRESSURE < 130 MM HG: ICD-10-PCS | Mod: CPTII,S$GLB,, | Performed by: STUDENT IN AN ORGANIZED HEALTH CARE EDUCATION/TRAINING PROGRAM

## 2023-04-24 PROCEDURE — 99999 PR PBB SHADOW E&M-EST. PATIENT-LVL II: CPT | Mod: PBBFAC,,, | Performed by: STUDENT IN AN ORGANIZED HEALTH CARE EDUCATION/TRAINING PROGRAM

## 2023-04-24 PROCEDURE — 3074F SYST BP LT 130 MM HG: CPT | Mod: CPTII,S$GLB,, | Performed by: STUDENT IN AN ORGANIZED HEALTH CARE EDUCATION/TRAINING PROGRAM

## 2023-04-24 PROCEDURE — 99214 OFFICE O/P EST MOD 30 MIN: CPT | Mod: S$GLB,,, | Performed by: STUDENT IN AN ORGANIZED HEALTH CARE EDUCATION/TRAINING PROGRAM

## 2023-04-24 PROCEDURE — 99999 PR PBB SHADOW E&M-EST. PATIENT-LVL II: ICD-10-PCS | Mod: PBBFAC,,, | Performed by: STUDENT IN AN ORGANIZED HEALTH CARE EDUCATION/TRAINING PROGRAM

## 2023-04-24 PROCEDURE — 99214 PR OFFICE/OUTPT VISIT, EST, LEVL IV, 30-39 MIN: ICD-10-PCS | Mod: S$GLB,,, | Performed by: STUDENT IN AN ORGANIZED HEALTH CARE EDUCATION/TRAINING PROGRAM

## 2023-04-24 RX ORDER — LEVETIRACETAM 500 MG/1
1000 TABLET ORAL 2 TIMES DAILY
Qty: 360 TABLET | Refills: 3
Start: 2023-04-24 | End: 2023-04-25 | Stop reason: SDUPTHER

## 2023-04-24 NOTE — PROGRESS NOTES
Chief Complaint   Patient presents with    MRI results         Galindo Grant is a 37 y.o. male with a history of multiple medical diagnoses as listed below that presents for evaluation to establish care for his history of seizures and neurofibromatosis.  Patient says that he has been taking medications as directed.  He has been concerned because he has been having increasing presents of neurofibromas on the scan.  He has not had any changes in his level of functioning.  No recent headaches.  He has an upcoming vision examination as he does complain of occasional episodes with vision seems to become less clear than he is accustomed.  He says that he has had a seizure recently despite being compliant with his medications.    Interval History  04/12/2021  Back pains, which are chronic have been his biggest botehr today. He has had CT as recommended. No headaches. He has recently seen his optometrist. No headaches. No seizures recently.    09/29/2021  Been doing well overall with exception of significant pain in his midback and low back.  Pain is so intense that time he feels that he has been unable to move.  No recent seizures.  Gabapentin was stopped because of undesirable side effects.  Other medications have been taking as directed without complaint.    11/16/2022  Concerned for significant worsening pain in his midback and low back radiating down his legs. No new weakness. No new bowel or bladder incontinence. No recent seizures.     4/24/23  Continues to have lower back pain, has not had followup w pain management. Here for MRI spine results. No new weakness. No new bowel or bladder incontinence. No recent seizures.     PAST MEDICAL HISTORY:  Past Medical History:   Diagnosis Date    Arthritis     in knee    Dysphagia, oropharyngeal 4/1/2016    Headache(784.0)     Irregular heart beat     Mass of larynx 10/2/2012    Neurofibromatosis syndrome     Neurofibromatosis, type 1 (von Recklinghausen's disease) birth     Neurofibromatosis, type 1 (von Recklinghausen's disease) 7/9/2012    Seizure 11/16/2022       PAST SURGICAL HISTORY:  Past Surgical History:   Procedure Laterality Date    ARM DEBRIDEMENT      NF - right    partial supraglottic laryngectomy  6/15/2012    REMOVAL OF SACRAL NEUROSTIMULATOR DEVICE N/A 3/30/2022    Procedure: REMOVAL, NEUROSTIMULATOR, SACRAL;  Surgeon: KIP Shipley MD;  Location: [facility] OR;  Service: Urology;  Laterality: N/A;  FLUORO NEEDED  RN Pre OP, Covid screen 3-29-22.  C A    REPLACEMENT OF SACRAL NERVE STIMULATOR N/A 12/10/2021    Procedure: REPLACEMENT, NEUROSTIMULATOR, SACRAL;  Surgeon: KIP Shipley MD;  Location: [facility] OR;  Service: Urology;  Laterality: N/A;  [employer]  MICHELL SARAH [phone] WILL BE HERE FOR THE CASE PER HANANE ON 12-1-2021 WILL BE HERE  RN Pre Op 11-23-21.  ---COVID NEGATIVE ON 12/8----- C A  IMPLANTS ORDERED ON 12-1-2021       SOCIAL HISTORY:  Social History     Socioeconomic History    Marital status:     Number of children: 1    Years of education: 12   Occupational History    Occupation:      Employer: OTHER   Tobacco Use    Smoking status: Former    Smokeless tobacco: Former   Substance and Sexual Activity    Alcohol use: No    Drug use: No    Sexual activity: Yes     Partners: Female   Social History Narrative    Moved back to [facility] in 2015. Now .        FAMILY HISTORY:  Family History   Problem Relation Age of Onset    Cancer Father         neuro fibroma mitosis    Cancer Paternal Uncle         neuro fibroma mitosis    Cancer Cousin         neuro fibroma mitosis       ALLERGIES AND MEDICATIONS: updated and reviewed.  Review of patient's allergies indicates:  No Known Allergies  Current Outpatient Medications   Medication Sig Dispense Refill    acetaminophen (TYLENOL) 500 MG tablet Take 1 tablet (500 mg total) by mouth every 6 (six) hours as needed for Pain. 30 tablet 0    albuterol (PROVENTIL/VENTOLIN HFA) 90 mcg/actuation inhaler  Inhale into the lungs.      atorvastatin (LIPITOR) 10 MG tablet Take 1 tablet (10 mg total) by mouth once daily. 90 tablet 1    bacitracin 500 unit/gram Oint Apply topically 2 (two) times daily. 30 g 0    baclofen (LIORESAL) 10 MG tablet Take 1 tablet (10 mg total) by mouth 3 (three) times daily. 90 tablet 11    calcium-vitamin D3 (CALCIUM 500 + D) 500 mg(1,250mg) -200 unit per tablet Take 1 tablet by mouth 2 (two) times daily with meals. 180 tablet 3    cyanocobalamin (VITAMIN B-12) 1000 MCG tablet Take 100 mcg by mouth 2 (two) times a day.      cyclobenzaprine (FLEXERIL) 5 MG tablet Take 1 tablet (5 mg total) by mouth nightly as needed for Muscle spasms. 90 tablet 1    diclofenac sodium (VOLTAREN) 1 % Gel Apply 2 g topically 4 (four) times daily. 100 g 5    FLUoxetine 20 MG capsule Take 1 capsule (20 mg total) by mouth once daily. 90 capsule 3    HYDROcodone-acetaminophen (NORCO) 7.5-325 mg per tablet Take 1 tablet by mouth every 6 (six) hours as needed for Pain. 28 tablet 0    hydrOXYzine HCl (ATARAX) 25 MG tablet Take 1 tablet (25 mg total) by mouth 3 (three) times daily. DO NOT DRIVE AFTER TAKING MED 90 tablet 5    ibuprofen (ADVIL,MOTRIN) 800 MG tablet Take 1 tablet (800 mg total) by mouth every 6 (six) hours as needed for Pain. 20 tablet 0    levETIRAcetam (KEPPRA) 500 MG Tab Take 2 tablets (1,000 mg total) by mouth 2 (two) times daily. 360 tablet 3    levothyroxine sodium (LEVOTHYROXINE ORAL) Take by mouth.      metoprolol tartrate (LOPRESSOR) 25 MG tablet Take 0.5 tablets (12.5 mg total) by mouth 2 (two) times daily. 90 tablet 1    mupirocin (BACTROBAN) 2 % ointment Apply topically 3 (three) times daily. 30 g 0    nitroGLYCERIN (NITROSTAT) 0.4 MG SL tablet Place 1 tablet (0.4 mg total) under the tongue every 5 (five) minutes as needed for Chest pain (Repeat in 5 min if CP persists. Go to ER if CP worsens). 30 tablet 2    rosuvastatin (CRESTOR) 10 MG tablet Take 10 mg by mouth once daily.      simvastatin  (ZOCOR) 20 MG tablet Take 20 mg by mouth every evening.      sumatriptan (IMITREX) 50 MG tablet 1 tab PO x1 prn. May take another tablet after 2 hours if the headache recurs. Maximum of 4 tablets a day. 12 tablet 1    vitamin D (VITAMIN D3) 1000 units Tab Take 1,000 Units by mouth 2 (two) times a day.       No current facility-administered medications for this visit.     Review of Systems   Constitutional: Negative for activity change, fatigue and unexpected weight change.   HENT: Negative for trouble swallowing and voice change.    Eyes: Positive for visual disturbance. Negative for photophobia and pain.   Respiratory: Negative for apnea and shortness of breath.    Cardiovascular: Negative for chest pain and palpitations.   Gastrointestinal: Negative for constipation, nausea and vomiting.   Genitourinary: Negative for difficulty urinating.   Musculoskeletal: Negative for arthralgias, back pain, gait problem, myalgias and neck pain.   Skin: Negative for color change and rash.   Neurological: Positive for seizures. Negative for dizziness, syncope, speech difficulty, weakness, light-headedness, numbness and headaches.   Psychiatric/Behavioral: Negative for agitation, behavioral problems and confusion.       Neurologic Exam     Mental Status   Oriented to person, place, and time.   Registration: recalls 3 of 3 objects.   Attention: normal. Concentration: normal.   Speech: speech is normal   Level of consciousness: alert  Knowledge: good.     Cranial Nerves     CN II   Right visual field deficit: none  Left visual field deficit: none     CN III, IV, VI   Pupils are equal, round, and reactive to light.  Extraocular motions are normal.   Right pupil: Size: 3 mm. Shape: regular.   Left pupil: Size: 3 mm. Shape: regular.   CN III: no CN III palsy  CN VI: no CN VI palsy  Nystagmus: none   Diplopia: none  Ophthalmoparesis: none  Upgaze: normal  Downgaze: normal  Conjugate gaze: present    CN VII   Facial expression full,  symmetric.   Right facial weakness: none  Left facial weakness: none    CN VIII   CN VIII normal.     CN XI   CN XI normal.     CN XII   CN XII normal.   Tongue deviation: none    Motor Exam   Muscle bulk: normal  Overall muscle tone: normal  Right arm tone: normal  Left arm tone: normal  Right leg tone: normal  Left leg tone: normal    Gait, Coordination, and Reflexes     Gait  Gait: normal    Coordination   Finger to nose coordination: normal    Tremor   Resting tremor: absent      Physical Exam  Constitutional:       Appearance: He is well-developed.   HENT:      Head: Normocephalic and atraumatic.   Eyes:      Extraocular Movements: EOM normal.      Pupils: Pupils are equal, round, and reactive to light.   Pulmonary:      Effort: Pulmonary effort is normal. No respiratory distress.   Musculoskeletal:         General: Normal range of motion.   Neurological:      Mental Status: He is alert and oriented to person, place, and time.      Coordination: Finger-Nose-Finger Test normal.      Gait: Gait is intact.   Psychiatric:         Speech: Speech normal.         Behavior: Behavior normal.         Vitals:    04/24/23 1416   BP: 124/72       Assessment & Plan:    Problem List Items Addressed This Visit          Neuro    Neurofibromatosis, type 1 (von Recklinghausen's disease)    Relevant Medications    levETIRAcetam (KEPPRA) 500 MG Tab    Seizure    Relevant Medications    levETIRAcetam (KEPPRA) 500 MG Tab       Orthopedic    Low back pain - Primary    Overview       -follow up with pain clinic.           Relevant Orders    Ambulatory referral/consult to Pain Clinic     Other Visit Diagnoses       Episode of shaking              This is a patient w neurofibramatosis with seizures well controlled, on keppra 1000mg BID. Has chronic lower back pain unchanged for years, has seen pain management in past and did not want interventional management at that time, repeat MRI of thoracic and lumbar spine recently obtained which  did not show significant changes nor significant canal stenosis. Continues to have pain. Discussed lifestyle modifications. Will send to pain management, patient willing to try intervention.    Questions answered.     Patient can followup in 6 months for seizure monitoring.      Patient was instructed about the following seizure precautions:   - Take all medications as prescribed by your doctor. Specifically take your anti-epileptic drugs at timed intervals that are on the prescription label.  - Do not drive or operate heavy machinery for a state-law specific period of time from seizure activity, 6 months from last seizure  - If you ride a bicycle or any other manually propelled device, please use a helmet  - Never swim alone or without close supervision  - Use showers only; do not take a bath or put yourself in a situation where loss of responsiveness could lead to drowning  - Only cook under supervision. Use the back burners only.  - Do not hold a child or carry an infant. If breastfeeding, only perform this in a seated position with cushioning.   - Do not engage in any activity that in the event of a seizure or loss of responsiveness could lead to any type of bodily injury to yourself or others

## 2023-04-25 ENCOUNTER — PATIENT MESSAGE (OUTPATIENT)
Dept: NEUROLOGY | Facility: CLINIC | Age: 38
End: 2023-04-25
Payer: MEDICARE

## 2023-04-25 DIAGNOSIS — R56.9 SEIZURE: ICD-10-CM

## 2023-04-25 DIAGNOSIS — Q85.01 NEUROFIBROMATOSIS, TYPE 1 (VON RECKLINGHAUSEN'S DISEASE): ICD-10-CM

## 2023-04-25 RX ORDER — LEVETIRACETAM 500 MG/1
1000 TABLET ORAL 2 TIMES DAILY
Qty: 360 TABLET | Refills: 1 | Status: SHIPPED | OUTPATIENT
Start: 2023-04-25 | End: 2023-10-22

## 2023-04-25 NOTE — TELEPHONE ENCOUNTER
Message sent to Dr. Oconnor.    CLAUDIA Alarcon/[facility]  [phone]                       ----- Message from Oly Charles sent at 4/25/2023  1:13 PM CDT -----  Type: RX Refill Request    Who Called: pt second request    Have you contacted your pharmacy:    Refill or New Rx:levETIRAcetam (KEPPRA) 500 MG Tab    RX Name and Strength:    How is the patient currently taking it? (ex. 1XDay):    Is this a 30 day or 90 day RX:    Preferred Pharmacy with phone number:  [id number] - [place] - [address]  [address]  [place] [address]  Phone: [phone] Fax: [phone]        Local or Mail Order:    Ordering Provider:    Would the patient rather a call back or a response via My [facility]? call    Best Call Back Number:[phone] (home)       Additional Information:

## 2023-08-04 ENCOUNTER — LAB VISIT (OUTPATIENT)
Dept: LAB | Facility: HOSPITAL | Age: 38
End: 2023-08-04
Attending: INTERNAL MEDICINE
Payer: MEDICARE

## 2023-08-04 DIAGNOSIS — D58.2 ELEVATED HEMOGLOBIN: ICD-10-CM

## 2023-08-04 LAB
ALBUMIN SERPL BCP-MCNC: 4.5 G/DL (ref 3.5–5.2)
ALP SERPL-CCNC: 53 U/L (ref 55–135)
ALT SERPL W/O P-5'-P-CCNC: 22 U/L (ref 10–44)
ANION GAP SERPL CALC-SCNC: 8 MMOL/L (ref 8–16)
AST SERPL-CCNC: 20 U/L (ref 10–40)
BASOPHILS # BLD AUTO: 0.06 K/UL (ref 0–0.2)
BASOPHILS NFR BLD: 1.1 % (ref 0–1.9)
BILIRUB SERPL-MCNC: 0.9 MG/DL (ref 0.1–1)
BUN SERPL-MCNC: 23 MG/DL (ref 6–20)
CALCIUM SERPL-MCNC: 9.7 MG/DL (ref 8.7–10.5)
CHLORIDE SERPL-SCNC: 106 MMOL/L (ref 95–110)
CO2 SERPL-SCNC: 26 MMOL/L (ref 23–29)
CREAT SERPL-MCNC: 1.2 MG/DL (ref 0.5–1.4)
DIFFERENTIAL METHOD: ABNORMAL
EOSINOPHIL # BLD AUTO: 0.3 K/UL (ref 0–0.5)
EOSINOPHIL NFR BLD: 4.8 % (ref 0–8)
ERYTHROCYTE [DISTWIDTH] IN BLOOD BY AUTOMATED COUNT: 12.2 % (ref 11.5–14.5)
EST. GFR  (NO RACE VARIABLE): >60 ML/MIN/1.73 M^2
GLUCOSE SERPL-MCNC: 94 MG/DL (ref 70–110)
HCT VFR BLD AUTO: 48.8 % (ref 40–54)
HGB BLD-MCNC: 16.3 G/DL (ref 14–18)
IMM GRANULOCYTES # BLD AUTO: 0.01 K/UL (ref 0–0.04)
IMM GRANULOCYTES NFR BLD AUTO: 0.2 % (ref 0–0.5)
LYMPHOCYTES # BLD AUTO: 2.6 K/UL (ref 1–4.8)
LYMPHOCYTES NFR BLD: 47.6 % (ref 18–48)
MCH RBC QN AUTO: 29.6 PG (ref 27–31)
MCHC RBC AUTO-ENTMCNC: 33.4 G/DL (ref 32–36)
MCV RBC AUTO: 89 FL (ref 82–98)
MONOCYTES # BLD AUTO: 0.7 K/UL (ref 0.3–1)
MONOCYTES NFR BLD: 13.4 % (ref 4–15)
NEUTROPHILS # BLD AUTO: 1.8 K/UL (ref 1.8–7.7)
NEUTROPHILS NFR BLD: 32.9 % (ref 38–73)
NRBC BLD-RTO: 0 /100 WBC
PLATELET # BLD AUTO: 271 K/UL (ref 150–450)
PMV BLD AUTO: 10.2 FL (ref 9.2–12.9)
POTASSIUM SERPL-SCNC: 4.1 MMOL/L (ref 3.5–5.1)
PROT SERPL-MCNC: 7 G/DL (ref 6–8.4)
RBC # BLD AUTO: 5.5 M/UL (ref 4.6–6.2)
SODIUM SERPL-SCNC: 140 MMOL/L (ref 136–145)
WBC # BLD AUTO: 5.46 K/UL (ref 3.9–12.7)

## 2023-08-04 PROCEDURE — 36415 COLL VENOUS BLD VENIPUNCTURE: CPT | Performed by: INTERNAL MEDICINE

## 2023-08-04 PROCEDURE — 82668 ASSAY OF ERYTHROPOIETIN: CPT | Performed by: INTERNAL MEDICINE

## 2023-08-04 PROCEDURE — 80053 COMPREHEN METABOLIC PANEL: CPT | Performed by: INTERNAL MEDICINE

## 2023-08-04 PROCEDURE — 85025 COMPLETE CBC W/AUTO DIFF WBC: CPT | Performed by: INTERNAL MEDICINE

## 2023-08-07 ENCOUNTER — OFFICE VISIT (OUTPATIENT)
Dept: HEMATOLOGY/ONCOLOGY | Facility: CLINIC | Age: 38
End: 2023-08-07
Payer: MEDICARE

## 2023-08-07 VITALS
TEMPERATURE: 99 F | DIASTOLIC BLOOD PRESSURE: 71 MMHG | SYSTOLIC BLOOD PRESSURE: 113 MMHG | WEIGHT: 161.63 LBS | OXYGEN SATURATION: 94 % | HEIGHT: 64 IN | HEART RATE: 75 BPM | BODY MASS INDEX: 27.59 KG/M2

## 2023-08-07 DIAGNOSIS — Z87.898 HISTORY OF SEIZURE: ICD-10-CM

## 2023-08-07 DIAGNOSIS — Q85.01 NEUROFIBROMATOSIS, TYPE 1 (VON RECKLINGHAUSEN'S DISEASE): ICD-10-CM

## 2023-08-07 DIAGNOSIS — R91.8 PULMONARY NODULES: ICD-10-CM

## 2023-08-07 DIAGNOSIS — D58.2 ELEVATED HEMOGLOBIN: Primary | ICD-10-CM

## 2023-08-07 LAB — EPO SERPL-ACNC: 8.4 MIU/ML (ref 2.6–18.5)

## 2023-08-07 PROCEDURE — 99999 PR PBB SHADOW E&M-EST. PATIENT-LVL V: CPT | Mod: PBBFAC,,, | Performed by: INTERNAL MEDICINE

## 2023-08-07 PROCEDURE — 1159F MED LIST DOCD IN RCRD: CPT | Mod: CPTII,S$GLB,, | Performed by: INTERNAL MEDICINE

## 2023-08-07 PROCEDURE — 99214 PR OFFICE/OUTPT VISIT, EST, LEVL IV, 30-39 MIN: ICD-10-PCS | Mod: S$GLB,,, | Performed by: INTERNAL MEDICINE

## 2023-08-07 PROCEDURE — 3078F DIAST BP <80 MM HG: CPT | Mod: CPTII,S$GLB,, | Performed by: INTERNAL MEDICINE

## 2023-08-07 PROCEDURE — 3008F BODY MASS INDEX DOCD: CPT | Mod: CPTII,S$GLB,, | Performed by: INTERNAL MEDICINE

## 2023-08-07 PROCEDURE — 99214 OFFICE O/P EST MOD 30 MIN: CPT | Mod: S$GLB,,, | Performed by: INTERNAL MEDICINE

## 2023-08-07 PROCEDURE — 3078F PR MOST RECENT DIASTOLIC BLOOD PRESSURE < 80 MM HG: ICD-10-PCS | Mod: CPTII,S$GLB,, | Performed by: INTERNAL MEDICINE

## 2023-08-07 PROCEDURE — 99999 PR PBB SHADOW E&M-EST. PATIENT-LVL V: ICD-10-PCS | Mod: PBBFAC,,, | Performed by: INTERNAL MEDICINE

## 2023-08-07 PROCEDURE — 3074F SYST BP LT 130 MM HG: CPT | Mod: CPTII,S$GLB,, | Performed by: INTERNAL MEDICINE

## 2023-08-07 PROCEDURE — 3008F PR BODY MASS INDEX (BMI) DOCUMENTED: ICD-10-PCS | Mod: CPTII,S$GLB,, | Performed by: INTERNAL MEDICINE

## 2023-08-07 PROCEDURE — 1159F PR MEDICATION LIST DOCUMENTED IN MEDICAL RECORD: ICD-10-PCS | Mod: CPTII,S$GLB,, | Performed by: INTERNAL MEDICINE

## 2023-08-07 PROCEDURE — 3074F PR MOST RECENT SYSTOLIC BLOOD PRESSURE < 130 MM HG: ICD-10-PCS | Mod: CPTII,S$GLB,, | Performed by: INTERNAL MEDICINE

## 2023-08-07 NOTE — PROGRESS NOTES
PATIENT: Galindo Grant  MRN: [id number]  DATE: 2023      Diagnosis:   1. Elevated hemoglobin    2. Neurofibromatosis, type 1 (von Recklinghausen's disease)    3. Seizure    4. Pulmonary nodules            Chief Complaint: No chief complaint on file.      Subjective:     Interval History: Mr. Grant is a 38 y.o. male with Neurofibromatosis, arthritis, seizures who presents for follow up for elevated hematocrit.  Since the last clinci visit the patient underwent laboratory evaluation on 21.  Iron studies showed normal ferritin and TIBC.  Pt was negative for mutations with BCR/ABL, Jake2, MPL, and CALR.  EPO level was normal at 11.8mIU/mL.  Serum testosterone was normal at 822ng/mL.  The patient complains of chronic back pain from arthritis.  He complains of fatigue and occasional left sided pleuritic pain.  The patient denies cough, SOB, abdominal pain, N/V, constipation, diarrhea.  The patient denies fever, chills, night sweats, weight loss, new lumps or bumps, easy bruising or bleeding.  He states he last used testosterone at the end of .  Hx of testosterone supp use last yr pill form O[facility] ( pt reports was on it for 2 yrs) Pt  Followed by Pulmonology for pulmonary nodules. Pt's father  of NF-associated lung tumor, per pt. 2020 and 2021 chest CTs (in [facility] system) showed unchanged small HUSAM pleural based nodule and right perifussural nodule.     Interval Hx;   Doing well  Appetite and weight stable  Non-smoker  He is f/b Pulmonology for PATRICIA   He is using asv nightly   No fatigue  Pt wakes up frequently nightime due to pain  CBC shows Hb 16.3g/dL  Hct 48    Pt f/b pulmonology for PATRICIA and pulmonary nodules       Past Medical History:   Past Medical History:   Diagnosis Date    Arthritis     in knee    Dysphagia, oropharyngeal 2016    Headache(784.0)     Irregular heart beat     Mass of larynx 10/2/2012    Neurofibromatosis syndrome     Neurofibromatosis,  type 1 (von Recklinghausen's disease) birth    Neurofibromatosis, type 1 (von Recklinghausen's disease) 7/9/2012    Seizure 11/16/2022       Past Surgical HIstory:   Past Surgical History:   Procedure Laterality Date    ARM DEBRIDEMENT      NF - right    partial supraglottic laryngectomy  6/15/2012    REMOVAL OF SACRAL NEUROSTIMULATOR DEVICE N/A 3/30/2022    Procedure: REMOVAL, NEUROSTIMULATOR, SACRAL;  Surgeon: KIP Shipley MD;  Location: [facility] OR;  Service: Urology;  Laterality: N/A;  FLUORO NEEDED  RN Pre OP, Covid screen 3-29-22.  C A    REPLACEMENT OF SACRAL NERVE STIMULATOR N/A 12/10/2021    Procedure: REPLACEMENT, NEUROSTIMULATOR, SACRAL;  Surgeon: KIP Shipley MD;  Location: [facility] OR;  Service: Urology;  Laterality: N/A;  Plastic Logic  MICHELL SARAH [phone] WILL BE HERE FOR THE CASE PER HANANE ON 12-1-2021 WILL BE HERE  RN Pre Op 11-23-21.  ---COVID NEGATIVE ON 12/8----- C A  IMPLANTS ORDERED ON 12-1-2021       Family History:   Family History   Problem Relation Age of Onset    Cancer Father         neuro fibroma mitosis    Cancer Paternal Uncle         neuro fibroma mitosis    Cancer Cousin         neuro fibroma mitosis       Social History:  reports that he has quit smoking. He has quit using smokeless tobacco. He reports that he does not drink alcohol and does not use drugs.    Allergies:  Review of patient's allergies indicates:  No Known Allergies    Medications:  Current Outpatient Medications   Medication Sig Dispense Refill    acetaminophen (TYLENOL) 500 MG tablet Take 1 tablet (500 mg total) by mouth every 6 (six) hours as needed for Pain. 30 tablet 0    albuterol (PROVENTIL/VENTOLIN HFA) 90 mcg/actuation inhaler Inhale into the lungs.      atorvastatin (LIPITOR) 10 MG tablet Take 1 tablet (10 mg total) by mouth once daily. 90 tablet 1    bacitracin 500 unit/gram Oint Apply topically 2 (two) times daily. 30 g 0    baclofen (LIORESAL) 10 MG tablet Take 1 tablet (10 mg total) by mouth 3 (three)  times daily. 90 tablet 11    calcium-vitamin D3 (CALCIUM 500 + D) 500 mg(1,250mg) -200 unit per tablet Take 1 tablet by mouth 2 (two) times daily with meals. 180 tablet 3    cyanocobalamin (VITAMIN B-12) 1000 MCG tablet Take 100 mcg by mouth 2 (two) times a day.      cyclobenzaprine (FLEXERIL) 5 MG tablet Take 1 tablet (5 mg total) by mouth nightly as needed for Muscle spasms. 90 tablet 1    HYDROcodone-acetaminophen (NORCO) 7.5-325 mg per tablet Take 1 tablet by mouth every 6 (six) hours as needed for Pain. 28 tablet 0    hydrOXYzine HCl (ATARAX) 25 MG tablet Take 1 tablet (25 mg total) by mouth 3 (three) times daily. DO NOT DRIVE AFTER TAKING MED 90 tablet 5    ibuprofen (ADVIL,MOTRIN) 800 MG tablet Take 1 tablet (800 mg total) by mouth every 6 (six) hours as needed for Pain. 20 tablet 0    levETIRAcetam (KEPPRA) 500 MG Tab Take 2 tablets (1,000 mg total) by mouth 2 (two) times daily. 360 tablet 1    levothyroxine sodium (LEVOTHYROXINE ORAL) Take by mouth.      mupirocin (BACTROBAN) 2 % ointment Apply topically 3 (three) times daily. 30 g 0    nitroGLYCERIN (NITROSTAT) 0.4 MG SL tablet Place 1 tablet (0.4 mg total) under the tongue every 5 (five) minutes as needed for Chest pain (Repeat in 5 min if CP persists. Go to ER if CP worsens). 30 tablet 2    rosuvastatin (CRESTOR) 10 MG tablet Take 10 mg by mouth once daily.      simvastatin (ZOCOR) 20 MG tablet Take 20 mg by mouth every evening.      sumatriptan (IMITREX) 50 MG tablet 1 tab PO x1 prn. May take another tablet after 2 hours if the headache recurs. Maximum of 4 tablets a day. 12 tablet 1    vitamin D (VITAMIN D3) 1000 units Tab Take 1,000 Units by mouth 2 (two) times a day.      diclofenac sodium (VOLTAREN) 1 % Gel Apply 2 g topically 4 (four) times daily. 100 g 5    FLUoxetine 20 MG capsule Take 1 capsule (20 mg total) by mouth once daily. 90 capsule 3    metoprolol tartrate (LOPRESSOR) 25 MG tablet Take 0.5 tablets (12.5 mg total) by mouth 2 (two) times  "daily. (Patient not taking: Reported on 8/7/2023) 90 tablet 1     No current facility-administered medications for this visit.       Review of Systems   Constitutional:  Negative for chills, fatigue, fever and unexpected weight change.   Respiratory:  Negative for cough and shortness of breath.    Cardiovascular:  Negative for chest pain and palpitations.   Gastrointestinal:  Negative for abdominal pain, constipation, diarrhea, nausea and vomiting.   Musculoskeletal:  Positive for back pain (chronic).   Skin:  Negative for color change and rash.   Neurological:  Negative for headaches.   Hematological:  Negative for adenopathy. Does not bruise/bleed easily.       ECOG Performance Status: 0   Objective:      Vitals:   Vitals:    08/07/23 1458   BP: 113/71   Pulse: 75   Temp: 99 °F (37.2 °C)   SpO2: (!) 94%   Weight: 73.3 kg (161 lb 9.6 oz)   Height: 5' 4" (1.626 m)       Physical Exam   Constitutional: He is oriented to person, place, and time. He appears well-developed and well-nourished.   HENT:   Head: Normocephalic.   Eyes: No scleral icterus.   Neck: Normal range of motion. Neck supple. No thyromegaly present.   Cardiovascular: Normal rate, regular rhythm and normal heart sounds.    No murmur heard.  Pulmonary/Chest: Effort normal and breath sounds normal. He has no wheezes. He has no rales.   Abdominal: Soft. Bowel sounds are normal. He exhibits no distension. There is no tenderness. There is no rebound and no guarding.   Musculoskeletal: Normal range of motion. He exhibits no edema.   Lymphadenopathy:     He has no cervical adenopathy.     He has no axillary adenopathy.        Right: No supraclavicular adenopathy present.        Left: No supraclavicular adenopathy present.   Neurological: He is alert and oriented to person, place, and time. No cranial nerve deficit.   Skin: No rash noted. No erythema.   Psychiatric: He has a normal mood and affect.          Laboratory Data:  Lab Visit on 08/04/2023   Component " Date Value Ref Range Status    WBC 08/04/2023 5.46  3.90 - 12.70 K/uL Final    RBC 08/04/2023 5.50  4.60 - 6.20 M/uL Final    Hemoglobin 08/04/2023 16.3  14.0 - 18.0 g/dL Final    Hematocrit 08/04/2023 48.8  40.0 - 54.0 % Final    MCV 08/04/2023 89  82 - 98 fL Final    MCH 08/04/2023 29.6  27.0 - 31.0 pg Final    MCHC 08/04/2023 33.4  32.0 - 36.0 g/dL Final    RDW 08/04/2023 12.2  11.5 - 14.5 % Final    Platelets 08/04/2023 271  150 - 450 K/uL Final    MPV 08/04/2023 10.2  9.2 - 12.9 fL Final    Immature Granulocytes 08/04/2023 0.2  0.0 - 0.5 % Final    Gran # (ANC) 08/04/2023 1.8  1.8 - 7.7 K/uL Final    Immature Grans (Abs) 08/04/2023 0.01  0.00 - 0.04 K/uL Final    Comment: Mild elevation in immature granulocytes is non specific and   can be seen in a variety of conditions including stress response,   acute inflammation, trauma and pregnancy. Correlation with other   laboratory and clinical findings is essential.      Lymph # 08/04/2023 2.6  1.0 - 4.8 K/uL Final    Mono # 08/04/2023 0.7  0.3 - 1.0 K/uL Final    Eos # 08/04/2023 0.3  0.0 - 0.5 K/uL Final    Baso # 08/04/2023 0.06  0.00 - 0.20 K/uL Final    nRBC 08/04/2023 0  0 /100 WBC Final    Gran % 08/04/2023 32.9 (L)  38.0 - 73.0 % Final    Lymph % 08/04/2023 47.6  18.0 - 48.0 % Final    Mono % 08/04/2023 13.4  4.0 - 15.0 % Final    Eosinophil % 08/04/2023 4.8  0.0 - 8.0 % Final    Basophil % 08/04/2023 1.1  0.0 - 1.9 % Final    Differential Method 08/04/2023 Automated   Final    Sodium 08/04/2023 140  136 - 145 mmol/L Final    Potassium 08/04/2023 4.1  3.5 - 5.1 mmol/L Final    Chloride 08/04/2023 106  95 - 110 mmol/L Final    CO2 08/04/2023 26  23 - 29 mmol/L Final    Glucose 08/04/2023 94  70 - 110 mg/dL Final    BUN 08/04/2023 23 (H)  6 - 20 mg/dL Final    Creatinine 08/04/2023 1.2  0.5 - 1.4 mg/dL Final    Calcium 08/04/2023 9.7  8.7 - 10.5 mg/dL Final    Total Protein 08/04/2023 7.0  6.0 - 8.4 g/dL Final    Albumin 08/04/2023 4.5  3.5 - 5.2 g/dL Final     Total Bilirubin 08/04/2023 0.9  0.1 - 1.0 mg/dL Final    Comment: For infants and newborns, interpretation of results should be based  on gestational age, weight and in agreement with clinical  observations.    Premature Infant recommended reference ranges:  Up to 24 hours.............<8.0 mg/dL  Up to 48 hours............<12.0 mg/dL  3-5 days..................<15.0 mg/dL  6-29 days.................<15.0 mg/dL      Alkaline Phosphatase 08/04/2023 53 (L)  55 - 135 U/L Final    AST 08/04/2023 20  10 - 40 U/L Final    ALT 08/04/2023 22  10 - 44 U/L Final    eGFR 08/04/2023 >60  >60 mL/min/1.73 m^2 Final    Anion Gap 08/04/2023 8  8 - 16 mmol/L Final    Erythropoietin 08/04/2023 8.4  2.6 - 18.5 mIU/mL Final    Comment: Test performed at [facility],  [address], [place] [id number] [phone]  Tana Merrill MD, PhD - Medical Director           Imaging: Reviewed       MRI T spine 11/11/2022     Impression:     No focal abnormality thoracic spine.     CT chest w/out contrast 6/13/22   Impression:     Lung-RADS Category:  1-benign appearance or behavior-Continue annual screening with LDCT in 12 months.     Clinically or potentially clinically significant non lung cancer finding:  None.    Assessment:       1. Elevated hemoglobin    2. Neurofibromatosis, type 1 (von Recklinghausen's disease)    3. History of seizure    4. Pulmonary nodules           Plan:     Elevated Hematocrit - The patient has 2 hemoglobin values in the last 3 years which were above the threshold of 16.5 to cause concern for polycythemia  -Hemoglobin on 7/22/20 was 18.7g/dL  -Repeat hemoglobin on 4/17/21 was 17g/dL  -The patient has been using testosterone on a regular basis  -His testosterone use may be contributing to his elevated hemoglobin values  -Pt again instructed to avoid testosterone use and that elevated hemoglobin values can predispose to heart disease and strokes  He is no longer on testosterone  - lab work on  4/12/21 showed normal ferritin and TIBC. Pt was negative for mutations with BCR/ABL, Jake2 V617F, MPL, and CALR.  EPO level was normal at 11.8mIU/mL.  Serum testosterone was normal at 822ng/mL  - Jak2 Exon 12 mutationNegative. No pathogenic genetic alterations were detected in  JAK2 , exons 12-15  Negative results for [id number], CALR exon 9, and MPL exon 10   -hemoglobin 16.3  g/dL , stable  - secondary to PATRICIA  asympttomatic  Cont to monitor counts      Neurofibromatosis - PT is being followed by Dr Otero in Neurology  -PT at increased risk of neural sheath tumors.    Hx of Seizure - pt states he has not had seizures in a long time  -Management per neurology      Pulmonary Nodules  F/b pulmonology   Small nodules unchnaged over 6 months between August 2020 and February 2021.  CT chest  6/13/22  Lung-RADS Category:  1-benign appearance   Plan follow up CT imaging         Plan follow up CT imaging for lung nodules  CBC,EPO, Testosterone level 1 week  prior to f/u  in 3mos

## 2023-10-09 ENCOUNTER — HOSPITAL ENCOUNTER (OUTPATIENT)
Dept: RADIOLOGY | Facility: HOSPITAL | Age: 38
Discharge: HOME | End: 2023-10-09
Attending: INTERNAL MEDICINE
Payer: MEDICARE

## 2023-10-09 DIAGNOSIS — R91.8 PULMONARY NODULES: ICD-10-CM

## 2023-10-09 PROCEDURE — 71250 CT THORAX DX C-: CPT | Mod: TC

## 2023-10-09 PROCEDURE — 71250 CT CHEST WITHOUT CONTRAST: ICD-10-PCS | Mod: 26,,, | Performed by: RADIOLOGY

## 2023-10-09 PROCEDURE — 71250 CT THORAX DX C-: CPT | Mod: 26,,, | Performed by: RADIOLOGY

## 2023-10-30 ENCOUNTER — LAB VISIT (OUTPATIENT)
Dept: LAB | Facility: HOSPITAL | Age: 38
End: 2023-10-30
Attending: INTERNAL MEDICINE
Payer: MEDICARE

## 2023-10-30 DIAGNOSIS — D58.2 ELEVATED HEMOGLOBIN: ICD-10-CM

## 2023-10-30 LAB
BASOPHILS # BLD AUTO: 0.06 K/UL (ref 0–0.2)
BASOPHILS NFR BLD: 0.7 % (ref 0–1.9)
DIFFERENTIAL METHOD: NORMAL
EOSINOPHIL # BLD AUTO: 0.3 K/UL (ref 0–0.5)
EOSINOPHIL NFR BLD: 3.3 % (ref 0–8)
ERYTHROCYTE [DISTWIDTH] IN BLOOD BY AUTOMATED COUNT: 12 % (ref 11.5–14.5)
HCT VFR BLD AUTO: 49.8 % (ref 40–54)
HGB BLD-MCNC: 16.6 G/DL (ref 14–18)
IMM GRANULOCYTES # BLD AUTO: 0.02 K/UL (ref 0–0.04)
IMM GRANULOCYTES NFR BLD AUTO: 0.2 % (ref 0–0.5)
LYMPHOCYTES # BLD AUTO: 2.1 K/UL (ref 1–4.8)
LYMPHOCYTES NFR BLD: 25.1 % (ref 18–48)
MCH RBC QN AUTO: 28.8 PG (ref 27–31)
MCHC RBC AUTO-ENTMCNC: 33.3 G/DL (ref 32–36)
MCV RBC AUTO: 87 FL (ref 82–98)
MONOCYTES # BLD AUTO: 0.9 K/UL (ref 0.3–1)
MONOCYTES NFR BLD: 10.4 % (ref 4–15)
NEUTROPHILS # BLD AUTO: 5.1 K/UL (ref 1.8–7.7)
NEUTROPHILS NFR BLD: 60.3 % (ref 38–73)
NRBC BLD-RTO: 0 /100 WBC
PLATELET # BLD AUTO: 263 K/UL (ref 150–450)
PMV BLD AUTO: 9.8 FL (ref 9.2–12.9)
RBC # BLD AUTO: 5.76 M/UL (ref 4.6–6.2)
TESTOST SERPL-MCNC: 790 NG/DL (ref 304–1227)
WBC # BLD AUTO: 8.43 K/UL (ref 3.9–12.7)

## 2023-10-30 PROCEDURE — 84403 ASSAY OF TOTAL TESTOSTERONE: CPT | Performed by: INTERNAL MEDICINE

## 2023-10-30 PROCEDURE — 82668 ASSAY OF ERYTHROPOIETIN: CPT | Performed by: INTERNAL MEDICINE

## 2023-10-30 PROCEDURE — 36415 COLL VENOUS BLD VENIPUNCTURE: CPT | Performed by: INTERNAL MEDICINE

## 2023-10-30 PROCEDURE — 85025 COMPLETE CBC W/AUTO DIFF WBC: CPT | Performed by: INTERNAL MEDICINE

## 2023-11-01 LAB — EPO SERPL-ACNC: 11.3 MIU/ML (ref 2.6–18.5)

## 2023-11-07 ENCOUNTER — OFFICE VISIT (OUTPATIENT)
Dept: HEMATOLOGY/ONCOLOGY | Facility: CLINIC | Age: 38
End: 2023-11-07
Payer: MEDICARE

## 2023-11-07 VITALS
HEART RATE: 97 BPM | BODY MASS INDEX: 26.31 KG/M2 | WEIGHT: 154.13 LBS | SYSTOLIC BLOOD PRESSURE: 117 MMHG | OXYGEN SATURATION: 98 % | HEIGHT: 64 IN | DIASTOLIC BLOOD PRESSURE: 75 MMHG

## 2023-11-07 DIAGNOSIS — R91.8 PULMONARY NODULES: ICD-10-CM

## 2023-11-07 DIAGNOSIS — Q85.01 NEUROFIBROMATOSIS, TYPE 1 (VON RECKLINGHAUSEN'S DISEASE): ICD-10-CM

## 2023-11-07 DIAGNOSIS — D58.2 ELEVATED HEMOGLOBIN: Primary | ICD-10-CM

## 2023-11-07 DIAGNOSIS — Z87.898 HISTORY OF SEIZURE: ICD-10-CM

## 2023-11-07 PROCEDURE — 99999 PR PBB SHADOW E&M-EST. PATIENT-LVL IV: CPT | Mod: PBBFAC,,, | Performed by: INTERNAL MEDICINE

## 2023-11-07 PROCEDURE — 99214 OFFICE O/P EST MOD 30 MIN: CPT | Mod: S$GLB,,, | Performed by: INTERNAL MEDICINE

## 2023-11-07 PROCEDURE — 3074F SYST BP LT 130 MM HG: CPT | Mod: CPTII,S$GLB,, | Performed by: INTERNAL MEDICINE

## 2023-11-07 PROCEDURE — 3074F PR MOST RECENT SYSTOLIC BLOOD PRESSURE < 130 MM HG: ICD-10-PCS | Mod: CPTII,S$GLB,, | Performed by: INTERNAL MEDICINE

## 2023-11-07 PROCEDURE — 99214 PR OFFICE/OUTPT VISIT, EST, LEVL IV, 30-39 MIN: ICD-10-PCS | Mod: S$GLB,,, | Performed by: INTERNAL MEDICINE

## 2023-11-07 PROCEDURE — 99999 PR PBB SHADOW E&M-EST. PATIENT-LVL IV: ICD-10-PCS | Mod: PBBFAC,,, | Performed by: INTERNAL MEDICINE

## 2023-11-07 PROCEDURE — 3008F BODY MASS INDEX DOCD: CPT | Mod: CPTII,S$GLB,, | Performed by: INTERNAL MEDICINE

## 2023-11-07 PROCEDURE — 3078F DIAST BP <80 MM HG: CPT | Mod: CPTII,S$GLB,, | Performed by: INTERNAL MEDICINE

## 2023-11-07 PROCEDURE — 1159F PR MEDICATION LIST DOCUMENTED IN MEDICAL RECORD: ICD-10-PCS | Mod: CPTII,S$GLB,, | Performed by: INTERNAL MEDICINE

## 2023-11-07 PROCEDURE — 3078F PR MOST RECENT DIASTOLIC BLOOD PRESSURE < 80 MM HG: ICD-10-PCS | Mod: CPTII,S$GLB,, | Performed by: INTERNAL MEDICINE

## 2023-11-07 PROCEDURE — 1159F MED LIST DOCD IN RCRD: CPT | Mod: CPTII,S$GLB,, | Performed by: INTERNAL MEDICINE

## 2023-11-07 PROCEDURE — 3008F PR BODY MASS INDEX (BMI) DOCUMENTED: ICD-10-PCS | Mod: CPTII,S$GLB,, | Performed by: INTERNAL MEDICINE

## 2023-11-07 NOTE — PROGRESS NOTES
PATIENT: Galindo Grant  MRN: [id number]  DATE: 2023      Diagnosis:   1. Elevated hemoglobin    2. Neurofibromatosis, type 1 (von Recklinghausen's disease)    3. Seizure    4. Pulmonary nodules            Chief Complaint: Follow-up      Subjective:     Interval History: Mr. Grant is a 38 y.o. male with Neurofibromatosis, arthritis, seizures who presents for follow up for elevated hematocrit.  Since the last clinci visit the patient underwent laboratory evaluation on 21.  Iron studies showed normal ferritin and TIBC.  Pt was negative for mutations with BCR/ABL, Jake2, MPL, and CALR.  EPO level was normal at 11.8mIU/mL.  Serum testosterone was normal at 822ng/mL.  The patient complains of chronic back pain from arthritis.  He complains of fatigue and occasional left sided pleuritic pain.  The patient denies cough, SOB, abdominal pain, N/V, constipation, diarrhea.  The patient denies fever, chills, night sweats, weight loss, new lumps or bumps, easy bruising or bleeding.  He states he last used testosterone at the end of .  Hx of testosterone supp use last yr pill form O[facility] at [facility] ( pt reports was on it for 2 yrs) Pt  Followed by Pulmonology for pulmonary nodules. Pt's father  of NF-associated lung tumor, per pt. 2020 and 2021 chest CTs (in [facility] system) showed unchanged small HUSAM pleural based nodule and right perifussural nodule.     Interval Hx;   Doing well  Appetite and weight stable  He is f/b Pulmonology for PATRICIA   He is using asv nightly   No fatigue  CBC shows Hb 16.6g/dL  Hct 49.8    Pt f/b pulmonology for PATRICIA on cpap  and pulmonary nodules     Last seizure 1 mo ago   He endorses compliance with Keppra    Former smoker remote past   Past Medical History:   Past Medical History:   Diagnosis Date    Arthritis     in knee    Dysphagia, oropharyngeal 2016    Headache(784.0)     Irregular heart beat     Mass of larynx 10/2/2012    Neurofibromatosis syndrome      Neurofibromatosis, type 1 (von Recklinghausen's disease) birth    Neurofibromatosis, type 1 (von Recklinghausen's disease) 7/9/2012    Seizure 11/16/2022       Past Surgical HIstory:   Past Surgical History:   Procedure Laterality Date    ARM DEBRIDEMENT      NF - right    partial supraglottic laryngectomy  6/15/2012    REMOVAL OF SACRAL NEUROSTIMULATOR DEVICE N/A 3/30/2022    Procedure: REMOVAL, NEUROSTIMULATOR, SACRAL;  Surgeon: KIP Shipley MD;  Location: [facility] OR;  Service: Urology;  Laterality: N/A;  FLUORO NEEDED  RN Pre OP, Covid screen 3-29-22.  C A    REPLACEMENT OF SACRAL NERVE STIMULATOR N/A 12/10/2021    Procedure: REPLACEMENT, NEUROSTIMULATOR, SACRAL;  Surgeon: KIP Shipley MD;  Location: [facility] OR;  Service: Urology;  Laterality: N/A;  [employer]  [facility] SARAH [phone] WILL BE HERE FOR THE CASE PER HANANE ON 12-1-2021 WILL BE HERE  RN Pre Op 11-23-21.  ---COVID NEGATIVE ON 12/8----- C A  IMPLANTS ORDERED ON 12-1-2021       Family History:   Family History   Problem Relation Age of Onset    Cancer Father         neuro fibroma mitosis    Cancer Paternal Uncle         neuro fibroma mitosis    Cancer Cousin         neuro fibroma mitosis       Social History:  reports that he has quit smoking. He has quit using smokeless tobacco. He reports that he does not drink alcohol and does not use drugs.    Allergies:  Review of patient's allergies indicates:  No Known Allergies    Medications:  Current Outpatient Medications   Medication Sig Dispense Refill    albuterol (PROVENTIL/VENTOLIN HFA) 90 mcg/actuation inhaler Inhale into the lungs.      atorvastatin (LIPITOR) 10 MG tablet Take 1 tablet (10 mg total) by mouth once daily. 90 tablet 1    bacitracin 500 unit/gram Oint Apply topically 2 (two) times daily. 30 g 0    calcium-vitamin D3 (CALCIUM 500 + D) 500 mg(1,250mg) -200 unit per tablet Take 1 tablet by mouth 2 (two) times daily with meals. 180 tablet 3    cyclobenzaprine (FLEXERIL) 5 MG tablet Take  1 tablet (5 mg total) by mouth nightly as needed for Muscle spasms. 90 tablet 1    hydrOXYzine HCl (ATARAX) 25 MG tablet Take 1 tablet (25 mg total) by mouth 3 (three) times daily. DO NOT DRIVE AFTER TAKING MED 90 tablet 5    ibuprofen (ADVIL,MOTRIN) 800 MG tablet Take 1 tablet (800 mg total) by mouth every 6 (six) hours as needed for Pain. 20 tablet 0    levothyroxine sodium (LEVOTHYROXINE ORAL) Take by mouth.      mupirocin (BACTROBAN) 2 % ointment Apply topically 3 (three) times daily. 30 g 0    nitroGLYCERIN (NITROSTAT) 0.4 MG SL tablet Place 1 tablet (0.4 mg total) under the tongue every 5 (five) minutes as needed for Chest pain (Repeat in 5 min if CP persists. Go to ER if CP worsens). 30 tablet 2    rosuvastatin (CRESTOR) 10 MG tablet Take 10 mg by mouth once daily.      acetaminophen (TYLENOL) 500 MG tablet Take 1 tablet (500 mg total) by mouth every 6 (six) hours as needed for Pain. (Patient not taking: Reported on 11/7/2023) 30 tablet 0    baclofen (LIORESAL) 10 MG tablet Take 1 tablet (10 mg total) by mouth 3 (three) times daily. 90 tablet 11    cyanocobalamin (VITAMIN B-12) 1000 MCG tablet Take 100 mcg by mouth 2 (two) times a day.      diclofenac sodium (VOLTAREN) 1 % Gel Apply 2 g topically 4 (four) times daily. 100 g 5    FLUoxetine 20 MG capsule Take 1 capsule (20 mg total) by mouth once daily. 90 capsule 3    HYDROcodone-acetaminophen (NORCO) 7.5-325 mg per tablet Take 1 tablet by mouth every 6 (six) hours as needed for Pain. (Patient not taking: Reported on 11/7/2023) 28 tablet 0    levETIRAcetam (KEPPRA) 500 MG Tab Take 2 tablets (1,000 mg total) by mouth 2 (two) times daily. 360 tablet 1    metoprolol tartrate (LOPRESSOR) 25 MG tablet Take 0.5 tablets (12.5 mg total) by mouth 2 (two) times daily. (Patient not taking: Reported on 8/7/2023) 90 tablet 1    simvastatin (ZOCOR) 20 MG tablet Take 20 mg by mouth every evening.      sumatriptan (IMITREX) 50 MG tablet 1 tab PO x1 prn. May take another  "tablet after 2 hours if the headache recurs. Maximum of 4 tablets a day. (Patient not taking: Reported on 11/7/2023) 12 tablet 1    vitamin D (VITAMIN D3) 1000 units Tab Take 1,000 Units by mouth 2 (two) times a day.       No current facility-administered medications for this visit.       Review of Systems   Constitutional:  Negative for chills, fatigue, fever and unexpected weight change.   Respiratory:  Negative for cough and shortness of breath.    Cardiovascular:  Negative for chest pain and palpitations.   Gastrointestinal:  Negative for abdominal pain, constipation, diarrhea, nausea and vomiting.   Musculoskeletal:  Positive for back pain (chronic).   Skin:  Negative for color change and rash.   Neurological:  Positive for seizures. Negative for headaches.   Hematological:  Negative for adenopathy. Does not bruise/bleed easily.       ECOG Performance Status: 0   Objective:      Vitals:   Vitals:    11/07/23 1444   BP: 117/75   Pulse: 97   SpO2: 98%   Weight: 69.9 kg (154 lb 1.6 oz)   Height: 5' 4" (1.626 m)           Physical Exam   Constitutional: He is oriented to person, place, and time. He appears well-developed and well-nourished.   HENT:   Head: Normocephalic.   Eyes: No scleral icterus.   Neck: Normal range of motion. Neck supple. No thyromegaly present.   Cardiovascular: Normal rate, regular rhythm and normal heart sounds.    No murmur heard.  Pulmonary/Chest: Effort normal and breath sounds normal. He has no wheezes. He has no rales.   Abdominal: Soft. Bowel sounds are normal. He exhibits no distension. There is no tenderness. There is no rebound and no guarding.   Musculoskeletal: Normal range of motion. He exhibits no edema.   Lymphadenopathy:     He has no cervical adenopathy.     He has no axillary adenopathy.        Right: No supraclavicular adenopathy present.        Left: No supraclavicular adenopathy present.   Neurological: He is alert and oriented to person, place, and time. No cranial nerve " deficit.   Skin: No rash noted. No erythema.   Psychiatric: He has a normal mood and affect.          Laboratory Data:  No visits with results within 1 Week(s) from this visit.   Latest known visit with results is:   Lab Visit on 10/30/2023   Component Date Value Ref Range Status    Erythropoietin 10/30/2023 11.3  2.6 - 18.5 mIU/mL Final    Comment: Test performed at [facility],  [address], [place]  [address]     [phone]  Tana Merrill MD, PhD - Medical Director      WBC 10/30/2023 8.43  3.90 - 12.70 K/uL Final    RBC 10/30/2023 5.76  4.60 - 6.20 M/uL Final    Hemoglobin 10/30/2023 16.6  14.0 - 18.0 g/dL Final    Hematocrit 10/30/2023 49.8  40.0 - 54.0 % Final    MCV 10/30/2023 87  82 - 98 fL Final    MCH 10/30/2023 28.8  27.0 - 31.0 pg Final    MCHC 10/30/2023 33.3  32.0 - 36.0 g/dL Final    RDW 10/30/2023 12.0  11.5 - 14.5 % Final    Platelets 10/30/2023 263  150 - 450 K/uL Final    MPV 10/30/2023 9.8  9.2 - 12.9 fL Final    Immature Granulocytes 10/30/2023 0.2  0.0 - 0.5 % Final    Gran # (ANC) 10/30/2023 5.1  1.8 - 7.7 K/uL Final    Immature Grans (Abs) 10/30/2023 0.02  0.00 - 0.04 K/uL Final    Comment: Mild elevation in immature granulocytes is non specific and   can be seen in a variety of conditions including stress response,   acute inflammation, trauma and pregnancy. Correlation with other   laboratory and clinical findings is essential.      Lymph # 10/30/2023 2.1  1.0 - 4.8 K/uL Final    Mono # 10/30/2023 0.9  0.3 - 1.0 K/uL Final    Eos # 10/30/2023 0.3  0.0 - 0.5 K/uL Final    Baso # 10/30/2023 0.06  0.00 - 0.20 K/uL Final    nRBC 10/30/2023 0  0 /100 WBC Final    Gran % 10/30/2023 60.3  38.0 - 73.0 % Final    Lymph % 10/30/2023 25.1  18.0 - 48.0 % Final    Mono % 10/30/2023 10.4  4.0 - 15.0 % Final    Eosinophil % 10/30/2023 3.3  0.0 - 8.0 % Final    Basophil % 10/30/2023 0.7  0.0 - 1.9 % Final    Differential Method 10/30/2023 Automated   Final    Testosterone, Total  10/30/2023 790  304 - 1227 ng/dL Final         Imaging: Reviewed       MRI T spine 11/11/2022     Impression:     No focal abnormality thoracic spine.     CT chest w/out contrast 6/13/22   Impression:     Lung-RADS Category:  1-benign appearance or behavior-Continue annual screening with LDCT in 12 months.     Clinically or potentially clinically significant non lung cancer finding:  None.        CT chest w/out contrast 10 /9/23     Impression:     Stable calcified subcentimeter For multiple solid nodules all <6 mm, Fleischner Society 2017 guidelines recommend no routine follow up for a low risk patient, or follow up with non-contrast chest CT at 12 months after discovery in a high risk patient.  Assessment:       1. Elevated hemoglobin    2. Neurofibromatosis, type 1 (von Recklinghausen's disease)    3. History of seizure    4. Pulmonary nodules             Plan:     Elevated Hematocrit - The patient has 2 hemoglobin values in the last 3 years which were above the threshold of 16.5 to cause concern for polycythemia  -Hemoglobin on 7/22/20 was 18.7g/dL  -Repeat hemoglobin on 4/17/21 was 17g/dL  -The patient has been using testosterone on a regular basis  -His testosterone use may be contributing to his elevated hemoglobin values  -Pt again instructed to avoid testosterone use and that elevated hemoglobin values can predispose to heart disease and strokes  He is no longer on testosterone  - lab work on 4/12/21 showed normal ferritin and TIBC. Pt was negative for mutations with BCR/ABL, Jake2 V617F, MPL, and CALR.  EPO level was normal at 11.8mIU/mL.  Serum testosterone was normal at 822ng/mL  - Jak2 Exon 12 mutationNegative. No pathogenic genetic alterations were detected in  JAK2 , exons 12-15  Negative results for [id number], CALR exon 9, and MPL exon 10   -hemoglobin 16.6 g/dL , stable  - secondary to PATRICIA  asymptomatic  Cont to monitor counts      Neurofibromatosis - PT is being followed by Dr Otero in  Neurology  -PT at increased risk of neural sheath tumors.    Hx of Seizure - last seizure last month  Endorses compliance with Keppra  -Management per neurology      Pulmonary Nodules  F/b pulmonology   Small nodules unchnaged over 6 months between August 2020 and February 2021.  CT chest  6/13/22  Lung-RADS Category:  1-benign appearance   Follow up CT [id number]/9/23  Stable calcified subcentimeter For multiple solid nodules all <6 mm  monitor      DECLINES FLU IMMUNIZATION     CBC,1 week  prior to f/u  in 4mos

## 2023-11-10 ENCOUNTER — OFFICE VISIT (OUTPATIENT)
Dept: PULMONOLOGY | Facility: CLINIC | Age: 38
End: 2023-11-10
Payer: MEDICARE

## 2023-11-10 VITALS
HEART RATE: 83 BPM | HEIGHT: 64 IN | SYSTOLIC BLOOD PRESSURE: 116 MMHG | WEIGHT: 154 LBS | OXYGEN SATURATION: 96 % | BODY MASS INDEX: 26.29 KG/M2 | DIASTOLIC BLOOD PRESSURE: 80 MMHG

## 2023-11-10 DIAGNOSIS — G47.31 COMPLEX SLEEP APNEA SYNDROME: Primary | ICD-10-CM

## 2023-11-10 PROCEDURE — 1159F MED LIST DOCD IN RCRD: CPT | Mod: CPTII,S$GLB,, | Performed by: INTERNAL MEDICINE

## 2023-11-10 PROCEDURE — 99213 OFFICE O/P EST LOW 20 MIN: CPT | Mod: S$GLB,,, | Performed by: INTERNAL MEDICINE

## 2023-11-10 PROCEDURE — 3074F SYST BP LT 130 MM HG: CPT | Mod: CPTII,S$GLB,, | Performed by: INTERNAL MEDICINE

## 2023-11-10 PROCEDURE — 3079F PR MOST RECENT DIASTOLIC BLOOD PRESSURE 80-89 MM HG: ICD-10-PCS | Mod: CPTII,S$GLB,, | Performed by: INTERNAL MEDICINE

## 2023-11-10 PROCEDURE — 3074F PR MOST RECENT SYSTOLIC BLOOD PRESSURE < 130 MM HG: ICD-10-PCS | Mod: CPTII,S$GLB,, | Performed by: INTERNAL MEDICINE

## 2023-11-10 PROCEDURE — 99999 PR PBB SHADOW E&M-EST. PATIENT-LVL IV: CPT | Mod: PBBFAC,,, | Performed by: INTERNAL MEDICINE

## 2023-11-10 PROCEDURE — 3008F BODY MASS INDEX DOCD: CPT | Mod: CPTII,S$GLB,, | Performed by: INTERNAL MEDICINE

## 2023-11-10 PROCEDURE — 99213 PR OFFICE/OUTPT VISIT, EST, LEVL III, 20-29 MIN: ICD-10-PCS | Mod: S$GLB,,, | Performed by: INTERNAL MEDICINE

## 2023-11-10 PROCEDURE — 3008F PR BODY MASS INDEX (BMI) DOCUMENTED: ICD-10-PCS | Mod: CPTII,S$GLB,, | Performed by: INTERNAL MEDICINE

## 2023-11-10 PROCEDURE — 1159F PR MEDICATION LIST DOCUMENTED IN MEDICAL RECORD: ICD-10-PCS | Mod: CPTII,S$GLB,, | Performed by: INTERNAL MEDICINE

## 2023-11-10 PROCEDURE — 3079F DIAST BP 80-89 MM HG: CPT | Mod: CPTII,S$GLB,, | Performed by: INTERNAL MEDICINE

## 2023-11-10 PROCEDURE — 99999 PR PBB SHADOW E&M-EST. PATIENT-LVL IV: ICD-10-PCS | Mod: PBBFAC,,, | Performed by: INTERNAL MEDICINE

## 2023-11-10 NOTE — PROGRESS NOTES
Galindo Grant  was seen as a follow up.      CHIEF COMPLAINT:    Chief Complaint   Patient presents with    Apnea       HISTORY OF PRESENT ILLNESS: Galindo Grant is a 38 y.o. male is here for sleep evaluation.   Patient was seen by Dr. De La Cruz for elevated hemoglobin and hematocrit.  Patient was referred to our clinic to assess for underlying sleep/pulmonary disorder that could contributed to erythrocytosis.  Our first encounter was 6/8/22.  Patient with remote smoking history.  1-2 cigarette per day as a teenager for a few months.  activities is limited by pain a/w scoliosis.  Patient was seen by Dr. Ball at [facility] for MAGALIE nodule.  Per Dr. Ball, magalie nodule was unchanged from 8/2020 to 2/2021.      Patient denied snoring.  No witnessed apnea.  Tire upon awake daily.  No parasomnia.  No cataplexy.  Sleep is difficult due to pain back and leg pain.      [place] Sleepiness Scale score during initial sleep evaluation was 12.  ESS today is 3.    S/p sleep study with CSI of 25.  High central apnea index may relate to chronic narcotic.  Patient s/p asv titration on 8/3/22.   Patient is using asv most night.  Feeling rested upon awake.  Sleep maintenance improve.      SLEEP ROUTINE:  Activity the hour prior to sleep: watch tv     Bed partner:  Wife   Time to bed:  12-1 am   Lights off:  off  Sleep onset latency:  unsure         Disruptions or awakenings:    0-1   Wakeup time:      1-2 pm   Perceived sleep quality:  rested    Daytime naps:      Occasional napping during the day  Weekend sleep routine:      same  Caffeine use: 1 cup per day  exercise habit:   none      PAST MEDICAL HISTORY:    Active Ambulatory Problems     Diagnosis Date Noted    Neurofibromatosis, type 1 (von Recklinghausen's disease) 07/09/2012    Other diseases of respiratory system, not elsewhere classified 06/16/2012    Other symptoms involving respiratory system and chest 06/16/2012    Systemic inflammatory response syndrome due to non-infectious  process without acute organ dysfunction 06/16/2012    Low back pain 07/09/2012    Dysphagia, oropharyngeal 04/01/2016    Chest pain, atypical 12/02/2021    CHEEK (dyspnea on exertion) 12/02/2021    Incomplete emptying of bladder 12/10/2021    Complex sleep apnea syndrome 06/08/2022    Pulmonary nodule 06/09/2022    Insomnia 06/09/2022    Erythrocytosis 06/09/2022    Seizure 11/16/2022     Resolved Ambulatory Problems     Diagnosis Date Noted    Benign neoplasm of larynx 06/18/2012    NF (neurofibromatosis) 1983    Sleep disturbances 06/09/2022     Past Medical History:   Diagnosis Date    Arthritis     Headache(784.0)     Irregular heart beat     Mass of larynx 10/2/2012    Neurofibromatosis syndrome                 PAST SURGICAL HISTORY:    Past Surgical History:   Procedure Laterality Date    ARM DEBRIDEMENT      NF - right    partial supraglottic laryngectomy  6/15/2012    REMOVAL OF SACRAL NEUROSTIMULATOR DEVICE N/A 3/30/2022    Procedure: REMOVAL, NEUROSTIMULATOR, SACRAL;  Surgeon: KIP Shipley MD;  Location: [facility] OR;  Service: Urology;  Laterality: N/A;  FLUORO NEEDED  RN Pre OP, Covid screen 3-29-22.  C A    REPLACEMENT OF SACRAL NERVE STIMULATOR N/A 12/10/2021    Procedure: REPLACEMENT, NEUROSTIMULATOR, SACRAL;  Surgeon: KIP Shipley MD;  Location: [facility] OR;  Service: Urology;  Laterality: N/A;  [employer]  [facility] SARAH [phone] WILL BE HERE FOR THE CASE PER HANANE ON 12-1-2021 WILL BE HERE  RN Pre Op 11-23-21.  ---COVID NEGATIVE ON 12/8----- C A  IMPLANTS ORDERED ON 12-1-2021         FAMILY HISTORY:                Family History   Problem Relation Age of Onset    Cancer Father         neuro fibroma mitosis    Cancer Paternal Uncle         neuro fibroma mitosis    Cancer Cousin         neuro fibroma mitosis       SOCIAL HISTORY:          Tobacco:   Social History     Tobacco Use   Smoking Status Former   Smokeless Tobacco Former       alcohol use:    Social History     Substance and Sexual  Activity   Alcohol Use No                 Occupation:  disable    ALLERGIES:  Review of patient's allergies indicates:  No Known Allergies    CURRENT MEDICATIONS:    Current Outpatient Medications   Medication Sig Dispense Refill    acetaminophen (TYLENOL) 500 MG tablet Take 1 tablet (500 mg total) by mouth every 6 (six) hours as needed for Pain. 30 tablet 0    albuterol (PROVENTIL/VENTOLIN HFA) 90 mcg/actuation inhaler Inhale into the lungs.      atorvastatin (LIPITOR) 10 MG tablet Take 1 tablet (10 mg total) by mouth once daily. 90 tablet 1    bacitracin 500 unit/gram Oint Apply topically 2 (two) times daily. 30 g 0    calcium-vitamin D3 (CALCIUM 500 + D) 500 mg(1,250mg) -200 unit per tablet Take 1 tablet by mouth 2 (two) times daily with meals. 180 tablet 3    cyanocobalamin (VITAMIN B-12) 1000 MCG tablet Take 100 mcg by mouth 2 (two) times a day.      cyclobenzaprine (FLEXERIL) 5 MG tablet Take 1 tablet (5 mg total) by mouth nightly as needed for Muscle spasms. 90 tablet 1    HYDROcodone-acetaminophen (NORCO) 7.5-325 mg per tablet Take 1 tablet by mouth every 6 (six) hours as needed for Pain. 28 tablet 0    hydrOXYzine HCl (ATARAX) 25 MG tablet Take 1 tablet (25 mg total) by mouth 3 (three) times daily. DO NOT DRIVE AFTER TAKING MED 90 tablet 5    ibuprofen (ADVIL,MOTRIN) 800 MG tablet Take 1 tablet (800 mg total) by mouth every 6 (six) hours as needed for Pain. 20 tablet 0    levothyroxine sodium (LEVOTHYROXINE ORAL) Take by mouth.      metoprolol tartrate (LOPRESSOR) 25 MG tablet Take 0.5 tablets (12.5 mg total) by mouth 2 (two) times daily. 90 tablet 1    mupirocin (BACTROBAN) 2 % ointment Apply topically 3 (three) times daily. 30 g 0    nitroGLYCERIN (NITROSTAT) 0.4 MG SL tablet Place 1 tablet (0.4 mg total) under the tongue every 5 (five) minutes as needed for Chest pain (Repeat in 5 min if CP persists. Go to ER if CP worsens). 30 tablet 2    rosuvastatin (CRESTOR) 10 MG tablet Take 10 mg by mouth once  "daily.      simvastatin (ZOCOR) 20 MG tablet Take 20 mg by mouth every evening.      sumatriptan (IMITREX) 50 MG tablet 1 tab PO x1 prn. May take another tablet after 2 hours if the headache recurs. Maximum of 4 tablets a day. 12 tablet 1    vitamin D (VITAMIN D3) 1000 units Tab Take 1,000 Units by mouth 2 (two) times a day.      baclofen (LIORESAL) 10 MG tablet Take 1 tablet (10 mg total) by mouth 3 (three) times daily. 90 tablet 11    diclofenac sodium (VOLTAREN) 1 % Gel Apply 2 g topically 4 (four) times daily. 100 g 5    FLUoxetine 20 MG capsule Take 1 capsule (20 mg total) by mouth once daily. 90 capsule 3    levETIRAcetam (KEPPRA) 500 MG Tab Take 2 tablets (1,000 mg total) by mouth 2 (two) times daily. 360 tablet 1     No current facility-administered medications for this visit.                  REVIEW OF SYSTEMS:     Sleep related symptoms as per HPI.  CONST:Denies weight gain    HEENT: Denies sinus congestion  PULM: Denies dyspnea  CARD:  Denies palpitations   GI:  Denies acid reflux  : Denies polyuria  NEURO: Denies headaches  PSYCH: anxious and depressed.    HEME: Denies anemia   Otherwise, a balance of systems reviewed is negative.          PHYSICAL EXAM:  Vitals:    11/10/23 1347   BP: 116/80   Pulse: 83   SpO2: 96%   Weight: 69.8 kg (153 lb 15.9 oz)   Height: 5' 4" (1.626 m)   PainSc: 0-No pain     Body mass index is 26.43 kg/m².     GENERAL: Normal development, well groomed  HEENT:  Conjunctivae are non-erythematous; Pupils equal, round, and reactive to light; Nose is symmetrical; Nasal mucosa is pink and moist; Septum is midline; Inferior turbinates are normal; Nasal airflow is normal; Posterior pharynx is pink; Modified Mallampati: 4; Posterior palate is normal; Tonsils +1; Uvula is normal and pink;Tongue is normal; Dentition is fair; No TMJ tenderness; Jaw opening and protrusion without click and without discomfort.  NECK: Supple. Neck circumference is 16 inches. No thyromegaly. No palpable nodes. "     SKIN: On face and neck: No abrasions, no rashes, no lesions.  No subcutaneous nodules are palpable.  RESPIRATORY: Chest is clear to auscultation.  Normal chest expansion and non-labored breathing at rest.  CARDIOVASCULAR: Normal S1, S2.  No murmurs, gallops or rubs. No carotid bruits bilaterally.  EXTREMITIES: No edema. No clubbing. No cyanosis. Station normal. Gait normal.        NEURO/PSYCH: Oriented to time, place and person. Normal attention span and concentration. Affect is full. Mood is normal.                                              DATA   psg 6/21/22 ahi of 25; csi of 10  Asv titration 8/3/22 ahi of 15    cxr (personally reviewed) 2/23/22 no consolidation or effusion  CT chest 6/13/22 multiple nodules with largest at 5 mm nodule rul (unchanged when compared to 2/17/21).    Lab Results   Component Value Date    TSH 1.572 07/22/2020     ASSESSMENT/PLAN  Problem List Items Addressed This Visit       Complex sleep apnea syndrome - Primary    Overview     -ahi of 25 with csi of 10.  High csi may relate to chronic narcotic.    Currently on asv  Poor compliance.  Residual ahi of 1.8.  Patient is benefiting from asv         Relevant Orders    CPAP/BIPAP SUPPLIES               Education: During our discussion today, we talked about the etiology of obstructive sleep apnea as well as the potential ramifications of untreated sleep apnea, which could include daytime sleepiness, hypertension, heart disease and/or stroke.     Precautions: The patient was advised to abstain from driving should they feel sleepy or drowsy.       Patient will No follow-ups on file. with md/np.      20 minutes of total time spent on the encounter, which includes face to face time and non-face to face time preparing to see the patient (eg, review of tests), Obtaining and/or reviewing separately obtained history, documenting clinical information in the electronic or other health record, independently interpreting results (not separately  reported) and communicating results to the patient/family/caregiver, or Care coordination (not separately reported).

## 2024-02-29 ENCOUNTER — LAB VISIT (OUTPATIENT)
Dept: LAB | Facility: HOSPITAL | Age: 39
End: 2024-02-29
Attending: INTERNAL MEDICINE

## 2024-02-29 DIAGNOSIS — D58.2 ELEVATED HEMOGLOBIN: ICD-10-CM

## 2024-02-29 LAB
BASOPHILS # BLD AUTO: 0.06 K/UL (ref 0–0.2)
BASOPHILS NFR BLD: 0.9 % (ref 0–1.9)
DIFFERENTIAL METHOD BLD: NORMAL
EOSINOPHIL # BLD AUTO: 0.4 K/UL (ref 0–0.5)
EOSINOPHIL NFR BLD: 5.5 % (ref 0–8)
ERYTHROCYTE [DISTWIDTH] IN BLOOD BY AUTOMATED COUNT: 12.2 % (ref 11.5–14.5)
HCT VFR BLD AUTO: 47.4 % (ref 40–54)
HGB BLD-MCNC: 16.1 G/DL (ref 14–18)
IMM GRANULOCYTES # BLD AUTO: 0.01 K/UL (ref 0–0.04)
IMM GRANULOCYTES NFR BLD AUTO: 0.2 % (ref 0–0.5)
LYMPHOCYTES # BLD AUTO: 2.8 K/UL (ref 1–4.8)
LYMPHOCYTES NFR BLD: 42.4 % (ref 18–48)
MCH RBC QN AUTO: 29.9 PG (ref 27–31)
MCHC RBC AUTO-ENTMCNC: 34 G/DL (ref 32–36)
MCV RBC AUTO: 88 FL (ref 82–98)
MONOCYTES # BLD AUTO: 0.7 K/UL (ref 0.3–1)
MONOCYTES NFR BLD: 11 % (ref 4–15)
NEUTROPHILS # BLD AUTO: 2.6 K/UL (ref 1.8–7.7)
NEUTROPHILS NFR BLD: 40 % (ref 38–73)
NRBC BLD-RTO: 0 /100 WBC
PLATELET # BLD AUTO: 262 K/UL (ref 150–450)
PMV BLD AUTO: 10 FL (ref 9.2–12.9)
RBC # BLD AUTO: 5.39 M/UL (ref 4.6–6.2)
WBC # BLD AUTO: 6.53 K/UL (ref 3.9–12.7)

## 2024-02-29 PROCEDURE — 36415 COLL VENOUS BLD VENIPUNCTURE: CPT | Performed by: INTERNAL MEDICINE

## 2024-02-29 PROCEDURE — 85025 COMPLETE CBC W/AUTO DIFF WBC: CPT | Performed by: INTERNAL MEDICINE

## 2024-03-08 ENCOUNTER — PATIENT MESSAGE (OUTPATIENT)
Dept: PULMONOLOGY | Facility: CLINIC | Age: 39
End: 2024-03-08
Payer: MEDICARE

## 2024-03-11 ENCOUNTER — TELEPHONE (OUTPATIENT)
Dept: PULMONOLOGY | Facility: CLINIC | Age: 39
End: 2024-03-11
Payer: MEDICARE

## 2024-03-11 DIAGNOSIS — G47.33 OSA (OBSTRUCTIVE SLEEP APNEA): Primary | ICD-10-CM

## 2024-03-12 ENCOUNTER — OFFICE VISIT (OUTPATIENT)
Dept: HEMATOLOGY/ONCOLOGY | Facility: CLINIC | Age: 39
End: 2024-03-12
Payer: MEDICARE

## 2024-03-12 VITALS
HEIGHT: 64 IN | HEART RATE: 89 BPM | OXYGEN SATURATION: 95 % | SYSTOLIC BLOOD PRESSURE: 132 MMHG | DIASTOLIC BLOOD PRESSURE: 78 MMHG | BODY MASS INDEX: 26.64 KG/M2 | WEIGHT: 156.06 LBS

## 2024-03-12 DIAGNOSIS — Z87.898 HISTORY OF SEIZURE: ICD-10-CM

## 2024-03-12 DIAGNOSIS — D58.2 ELEVATED HEMOGLOBIN: Primary | ICD-10-CM

## 2024-03-12 DIAGNOSIS — R91.8 PULMONARY NODULES: ICD-10-CM

## 2024-03-12 DIAGNOSIS — Q85.01 NEUROFIBROMATOSIS, TYPE 1 (VON RECKLINGHAUSEN'S DISEASE): ICD-10-CM

## 2024-03-12 PROCEDURE — 99214 OFFICE O/P EST MOD 30 MIN: CPT | Mod: S$GLB,,, | Performed by: INTERNAL MEDICINE

## 2024-03-12 PROCEDURE — 99999 PR PBB SHADOW E&M-EST. PATIENT-LVL V: CPT | Mod: PBBFAC,,, | Performed by: INTERNAL MEDICINE

## 2024-03-12 NOTE — PROGRESS NOTES
PATIENT: Galindo Grant  MRN: [id number]  DATE: 3/13/2024      Diagnosis:   1. Elevated hemoglobin    2. Neurofibromatosis, type 1 (von Recklinghausen's disease)    3. Seizure    4. Pulmonary nodules            Chief Complaint: Follow-up      Subjective:     Interval History: Mr. Grant is a 38 y.o. male with Neurofibromatosis, arthritis, seizures who presents for follow up for elevated hematocrit.  Since the last clinci visit the patient underwent laboratory evaluation on 21.  Iron studies showed normal ferritin and TIBC.  Pt was negative for mutations with BCR/ABL, Jake2, MPL, and CALR.  EPO level was normal at 11.8mIU/mL.  Serum testosterone was normal at 822ng/mL.  The patient complains of chronic back pain from arthritis.  He complains of fatigue and occasional left sided pleuritic pain.  The patient denies cough, SOB, abdominal pain, N/V, constipation, diarrhea.  The patient denies fever, chills, night sweats, weight loss, new lumps or bumps, easy bruising or bleeding.  He states he last used testosterone at the end of .  Hx of testosterone supp use last yr pill form O[facility] at [facility] ( pt reports was on it for 2 yrs) Pt  Followed by Pulmonology for pulmonary nodules. Pt's father  of NF-associated lung tumor, per pt. 2020 and 2021 chest CTs (in [facility] system) showed unchanged small HUSAM pleural based nodule and right perifussural nodule.     Interval Hx;   Doing well  Appetite and weight stable  He is f/b Pulmonology for PATRICIA   Mild  fatigue  CBC 24 shows Hb 16.1 g/dL  Hct 47.4     Pt f/b pulmonology for PATRICIA on cpap  and pulmonary nodules     Last seizure 1 mo ago   He endorses compliance with Keppra    Former smoker remote past   Past Medical History:   Past Medical History:   Diagnosis Date    Arthritis     in knee    Dysphagia, oropharyngeal 2016    Headache(784.0)     Irregular heart beat     Mass of larynx 10/2/2012    Neurofibromatosis syndrome      Neurofibromatosis, type 1 (von Recklinghausen's disease) birth    Neurofibromatosis, type 1 (von Recklinghausen's disease) 7/9/2012    Seizure 11/16/2022       Past Surgical HIstory:   Past Surgical History:   Procedure Laterality Date    ARM DEBRIDEMENT      NF - right    partial supraglottic laryngectomy  6/15/2012    REMOVAL OF SACRAL NEUROSTIMULATOR DEVICE N/A 3/30/2022    Procedure: REMOVAL, NEUROSTIMULATOR, SACRAL;  Surgeon: KIP Shipley MD;  Location: [facility] OR;  Service: Urology;  Laterality: N/A;  FLUORO NEEDED  RN Pre OP, Covid screen 3-29-22.  C A    REPLACEMENT OF SACRAL NERVE STIMULATOR N/A 12/10/2021    Procedure: REPLACEMENT, NEUROSTIMULATOR, SACRAL;  Surgeon: KIP Shipley MD;  Location: [facility] OR;  Service: Urology;  Laterality: N/A;  [place]  [facility] SARAH [phone] WILL BE HERE FOR THE CASE PER HANANE ON 12-1-2021 WILL BE HERE  RN Pre Op 11-23-21.  ---COVID NEGATIVE ON 12/8----- C A  IMPLANTS ORDERED ON 12-1-2021       Family History:   Family History   Problem Relation Age of Onset    Cancer Father         neuro fibroma mitosis    Cancer Paternal Uncle         neuro fibroma mitosis    Cancer Cousin         neuro fibroma mitosis       Social History:  reports that he has quit smoking. He has quit using smokeless tobacco. He reports that he does not drink alcohol and does not use drugs.    Allergies:  Review of patient's allergies indicates:  No Known Allergies    Medications:  Current Outpatient Medications   Medication Sig Dispense Refill    acetaminophen (TYLENOL) 500 MG tablet Take 1 tablet (500 mg total) by mouth every 6 (six) hours as needed for Pain. 30 tablet 0    albuterol (PROVENTIL/VENTOLIN HFA) 90 mcg/actuation inhaler Inhale into the lungs.      atorvastatin (LIPITOR) 10 MG tablet Take 1 tablet (10 mg total) by mouth once daily. 90 tablet 1    bacitracin 500 unit/gram Oint Apply topically 2 (two) times daily. 30 g 0    calcium-vitamin D3 (CALCIUM 500 + D) 500 mg(1,250mg) -200  unit per tablet Take 1 tablet by mouth 2 (two) times daily with meals. 180 tablet 3    cyanocobalamin (VITAMIN B-12) 1000 MCG tablet Take 100 mcg by mouth 2 (two) times a day.      cyclobenzaprine (FLEXERIL) 5 MG tablet Take 1 tablet (5 mg total) by mouth nightly as needed for Muscle spasms. 90 tablet 1    HYDROcodone-acetaminophen (NORCO) 7.5-325 mg per tablet Take 1 tablet by mouth every 6 (six) hours as needed for Pain. 28 tablet 0    hydrOXYzine HCl (ATARAX) 25 MG tablet Take 1 tablet (25 mg total) by mouth 3 (three) times daily. DO NOT DRIVE AFTER TAKING MED 90 tablet 5    ibuprofen (ADVIL,MOTRIN) 800 MG tablet Take 1 tablet (800 mg total) by mouth every 6 (six) hours as needed for Pain. 20 tablet 0    levothyroxine sodium (LEVOTHYROXINE ORAL) Take by mouth.      metoprolol tartrate (LOPRESSOR) 25 MG tablet Take 0.5 tablets (12.5 mg total) by mouth 2 (two) times daily. 90 tablet 1    mupirocin (BACTROBAN) 2 % ointment Apply topically 3 (three) times daily. 30 g 0    nitroGLYCERIN (NITROSTAT) 0.4 MG SL tablet Place 1 tablet (0.4 mg total) under the tongue every 5 (five) minutes as needed for Chest pain (Repeat in 5 min if CP persists. Go to ER if CP worsens). 30 tablet 2    rosuvastatin (CRESTOR) 10 MG tablet Take 10 mg by mouth once daily.      sumatriptan (IMITREX) 50 MG tablet 1 tab PO x1 prn. May take another tablet after 2 hours if the headache recurs. Maximum of 4 tablets a day. 12 tablet 1    vitamin D (VITAMIN D3) 1000 units Tab Take 1,000 Units by mouth 2 (two) times a day.      baclofen (LIORESAL) 10 MG tablet Take 1 tablet (10 mg total) by mouth 3 (three) times daily. 90 tablet 11    diclofenac sodium (VOLTAREN) 1 % Gel Apply 2 g topically 4 (four) times daily. 100 g 5    FLUoxetine 20 MG capsule Take 1 capsule (20 mg total) by mouth once daily. 90 capsule 3    levETIRAcetam (KEPPRA) 500 MG Tab Take 2 tablets (1,000 mg total) by mouth 2 (two) times daily. 360 tablet 1    simvastatin (ZOCOR) 20 MG  "tablet Take 20 mg by mouth every evening.       No current facility-administered medications for this visit.       Review of Systems   Constitutional:  Negative for chills, fatigue, fever and unexpected weight change.   Respiratory:  Negative for cough and shortness of breath.    Cardiovascular:  Negative for chest pain and palpitations.   Gastrointestinal:  Negative for abdominal pain, constipation, diarrhea, nausea and vomiting.   Musculoskeletal:  Positive for back pain (chronic).   Skin:  Negative for color change and rash.   Neurological:  Positive for seizures. Negative for headaches.   Hematological:  Negative for adenopathy. Does not bruise/bleed easily.       ECOG Performance Status: 0   Objective:      Vitals:   Vitals:    03/12/24 1453   BP: 132/78   Pulse: 89   SpO2: 95%   Weight: 70.8 kg (156 lb 1.4 oz)   Height: 5' 4" (1.626 m)           Physical Exam   Constitutional: He is oriented to person, place, and time. He appears well-developed and well-nourished.   HENT:   Head: Normocephalic.   Eyes: No scleral icterus.   Neck: Normal range of motion. Neck supple. No thyromegaly present.   Cardiovascular: Normal rate, regular rhythm and normal heart sounds.    No murmur heard.  Pulmonary/Chest: Effort normal and breath sounds normal. He has no wheezes. He has no rales.   Abdominal: Soft. Bowel sounds are normal. He exhibits no distension. There is no tenderness. There is no rebound and no guarding.   Musculoskeletal: Normal range of motion. He exhibits no edema.   Lymphadenopathy:     He has no cervical adenopathy.     He has no axillary adenopathy.        Right: No supraclavicular adenopathy present.        Left: No supraclavicular adenopathy present.   Neurological: He is alert and oriented to person, place, and time. No cranial nerve deficit.   Skin: No rash noted. No erythema.   Psychiatric: He has a normal mood and affect.          Laboratory Data:  No visits with results within 1 Week(s) from this " visit.   Latest known visit with results is:   Lab Visit on 02/29/2024   Component Date Value Ref Range Status    WBC 02/29/2024 6.53  3.90 - 12.70 K/uL Final    RBC 02/29/2024 5.39  4.60 - 6.20 M/uL Final    Hemoglobin 02/29/2024 16.1  14.0 - 18.0 g/dL Final    Hematocrit 02/29/2024 47.4  40.0 - 54.0 % Final    MCV 02/29/2024 88  82 - 98 fL Final    MCH 02/29/2024 29.9  27.0 - 31.0 pg Final    MCHC 02/29/2024 34.0  32.0 - 36.0 g/dL Final    RDW 02/29/2024 12.2  11.5 - 14.5 % Final    Platelets 02/29/2024 262  150 - 450 K/uL Final    MPV 02/29/2024 10.0  9.2 - 12.9 fL Final    Immature Granulocytes 02/29/2024 0.2  0.0 - 0.5 % Final    Gran # (ANC) 02/29/2024 2.6  1.8 - 7.7 K/uL Final    Immature Grans (Abs) 02/29/2024 0.01  0.00 - 0.04 K/uL Final    Comment: Mild elevation in immature granulocytes is non specific and   can be seen in a variety of conditions including stress response,   acute inflammation, trauma and pregnancy. Correlation with other   laboratory and clinical findings is essential.      Lymph # 02/29/2024 2.8  1.0 - 4.8 K/uL Final    Mono # 02/29/2024 0.7  0.3 - 1.0 K/uL Final    Eos # 02/29/2024 0.4  0.0 - 0.5 K/uL Final    Baso # 02/29/2024 0.06  0.00 - 0.20 K/uL Final    nRBC 02/29/2024 0  0 /100 WBC Final    Gran % 02/29/2024 40.0  38.0 - 73.0 % Final    Lymph % 02/29/2024 42.4  18.0 - 48.0 % Final    Mono % 02/29/2024 11.0  4.0 - 15.0 % Final    Eosinophil % 02/29/2024 5.5  0.0 - 8.0 % Final    Basophil % 02/29/2024 0.9  0.0 - 1.9 % Final    Differential Method 02/29/2024 Automated   Final         Imaging: Reviewed       MRI T spine 11/11/2022     Impression:     No focal abnormality thoracic spine.     CT chest w/out contrast 6/13/22   Impression:     Lung-RADS Category:  1-benign appearance or behavior-Continue annual screening with LDCT in 12 months.     Clinically or potentially clinically significant non lung cancer finding:  None.        CT chest w/out contrast 10 /9/23     Impression:      Stable calcified subcentimeter For multiple solid nodules all <6 mm, Fleischner Society 2017 guidelines recommend no routine follow up for a low risk patient, or follow up with non-contrast chest CT at 12 months after discovery in a high risk patient.  Assessment:       1. Neurofibromatosis, type 1 (von Recklinghausen's disease)    2. Elevated hemoglobin               Plan:     Elevated Hematocrit - The patient has 2 hemoglobin values in the last 3 years which were above the threshold of 16.5 to cause concern for polycythemia  -Hemoglobin on 7/22/20 was 18.7g/dL  -Repeat hemoglobin on 4/17/21 was 17g/dL  -The patient has been using testosterone on a regular basis  -His testosterone use may be contributing to his elevated hemoglobin values  -Pt again instructed to avoid testosterone use and that elevated hemoglobin values can predispose to heart disease and strokes  He is no longer on testosterone  - lab work on 4/12/21 showed normal ferritin and TIBC. Pt was negative for mutations with BCR/ABL, Jake2 V617F, MPL, and CALR.  EPO level was normal at 11.8mIU/mL.  Serum testosterone was normal at 822ng/mL  - Jak2 Exon 12 mutationNegative. No pathogenic genetic alterations were detected in  JAK2 , exons 12-15  Negative results for [id number], CALR exon 9, and MPL exon 10   -hemoglobin 16.1 g/dL , stable  - secondary to PATRICIA  asymptomatic  Cont to monitor counts      Neurofibromatosis - PT is being followed by Dr Otero in Neurology  -PT at increased risk of neural sheath tumors.    Hx of Seizure - last seizure last month  Endorses compliance with Keppra  -Management per neurology      Pulmonary Nodules  F/b pulmonology   Small nodules unchnaged over 6 months between August 2020 and February 2021.  CT chest  6/13/22  Lung-RADS Category:  1-benign appearance   Follow up CT [id number]/9/23  Stable calcified subcentimeter For multiple solid nodules all <6 mm  monitor      DECLINES FLU IMMUNIZATION     CBC,1 week  prior to f/u   in 4mos

## 2024-03-12 NOTE — TELEPHONE ENCOUNTER
----- Message from Jennifer Nicole sent at 3/8/2024  4:03 PM CST -----  Regarding: Supply Rx  Dr. Argueta,    The pt called our office today wanting to try a FFM. The Rx on file is for a nasal mask. Please let me know if you put an order in for the FFM so I can call to get him scheduled.       Thank you,    Jennifer

## 2024-07-08 ENCOUNTER — LAB VISIT (OUTPATIENT)
Dept: LAB | Facility: HOSPITAL | Age: 39
End: 2024-07-08
Attending: INTERNAL MEDICINE
Payer: MEDICARE

## 2024-07-08 DIAGNOSIS — Q85.01 NEUROFIBROMATOSIS, TYPE 1 (VON RECKLINGHAUSEN'S DISEASE): ICD-10-CM

## 2024-07-08 DIAGNOSIS — D58.2 ELEVATED HEMOGLOBIN: ICD-10-CM

## 2024-07-08 LAB
BASOPHILS # BLD AUTO: 0.06 K/UL (ref 0–0.2)
BASOPHILS NFR BLD: 1.2 % (ref 0–1.9)
DIFFERENTIAL METHOD BLD: NORMAL
EOSINOPHIL # BLD AUTO: 0.4 K/UL (ref 0–0.5)
EOSINOPHIL NFR BLD: 6.9 % (ref 0–8)
ERYTHROCYTE [DISTWIDTH] IN BLOOD BY AUTOMATED COUNT: 12.2 % (ref 11.5–14.5)
FERRITIN SERPL-MCNC: 87 NG/ML (ref 20–300)
HCT VFR BLD AUTO: 49.4 % (ref 40–54)
HGB BLD-MCNC: 16.5 G/DL (ref 14–18)
IMM GRANULOCYTES # BLD AUTO: 0.02 K/UL (ref 0–0.04)
IMM GRANULOCYTES NFR BLD AUTO: 0.4 % (ref 0–0.5)
IRON SERPL-MCNC: 128 UG/DL (ref 45–160)
LYMPHOCYTES # BLD AUTO: 1.7 K/UL (ref 1–4.8)
LYMPHOCYTES NFR BLD: 33.5 % (ref 18–48)
MCH RBC QN AUTO: 29.7 PG (ref 27–31)
MCHC RBC AUTO-ENTMCNC: 33.4 G/DL (ref 32–36)
MCV RBC AUTO: 89 FL (ref 82–98)
MONOCYTES # BLD AUTO: 0.6 K/UL (ref 0.3–1)
MONOCYTES NFR BLD: 11 % (ref 4–15)
NEUTROPHILS # BLD AUTO: 2.4 K/UL (ref 1.8–7.7)
NEUTROPHILS NFR BLD: 47 % (ref 38–73)
NRBC BLD-RTO: 0 /100 WBC
PLATELET # BLD AUTO: 254 K/UL (ref 150–450)
PMV BLD AUTO: 9.6 FL (ref 9.2–12.9)
RBC # BLD AUTO: 5.55 M/UL (ref 4.6–6.2)
SATURATED IRON: 48 % (ref 20–50)
TOTAL IRON BINDING CAPACITY: 268 UG/DL (ref 250–450)
TRANSFERRIN SERPL-MCNC: 181 MG/DL (ref 200–375)
WBC # BLD AUTO: 5.19 K/UL (ref 3.9–12.7)

## 2024-07-08 PROCEDURE — 82728 ASSAY OF FERRITIN: CPT | Performed by: INTERNAL MEDICINE

## 2024-07-08 PROCEDURE — 85025 COMPLETE CBC W/AUTO DIFF WBC: CPT | Performed by: INTERNAL MEDICINE

## 2024-07-08 PROCEDURE — 84466 ASSAY OF TRANSFERRIN: CPT | Performed by: INTERNAL MEDICINE

## 2024-07-08 PROCEDURE — 36415 COLL VENOUS BLD VENIPUNCTURE: CPT | Performed by: INTERNAL MEDICINE

## 2024-07-10 ENCOUNTER — OFFICE VISIT (OUTPATIENT)
Dept: NEUROLOGY | Facility: CLINIC | Age: 39
End: 2024-07-10
Payer: MEDICARE

## 2024-07-10 VITALS
WEIGHT: 160.69 LBS | TEMPERATURE: 98 F | RESPIRATION RATE: 18 BRPM | DIASTOLIC BLOOD PRESSURE: 78 MMHG | BODY MASS INDEX: 27.43 KG/M2 | HEIGHT: 64 IN | HEART RATE: 80 BPM | OXYGEN SATURATION: 97 % | SYSTOLIC BLOOD PRESSURE: 109 MMHG

## 2024-07-10 DIAGNOSIS — M54.50 CHRONIC BILATERAL LOW BACK PAIN, UNSPECIFIED WHETHER SCIATICA PRESENT: ICD-10-CM

## 2024-07-10 DIAGNOSIS — G89.29 CHRONIC BILATERAL LOW BACK PAIN, UNSPECIFIED WHETHER SCIATICA PRESENT: ICD-10-CM

## 2024-07-10 DIAGNOSIS — R56.9 SEIZURE: Primary | ICD-10-CM

## 2024-07-10 PROCEDURE — 99499 UNLISTED E&M SERVICE: CPT | Mod: S$GLB,,, | Performed by: STUDENT IN AN ORGANIZED HEALTH CARE EDUCATION/TRAINING PROGRAM

## 2024-07-10 PROCEDURE — 99999 PR PBB SHADOW E&M-EST. PATIENT-LVL IV: CPT | Mod: PBBFAC,,, | Performed by: STUDENT IN AN ORGANIZED HEALTH CARE EDUCATION/TRAINING PROGRAM

## 2024-07-11 ENCOUNTER — OFFICE VISIT (OUTPATIENT)
Dept: NEUROLOGY | Facility: CLINIC | Age: 39
End: 2024-07-11
Payer: MEDICARE

## 2024-07-11 VITALS
OXYGEN SATURATION: 99 % | HEIGHT: 64 IN | WEIGHT: 161.19 LBS | TEMPERATURE: 98 F | DIASTOLIC BLOOD PRESSURE: 77 MMHG | BODY MASS INDEX: 27.52 KG/M2 | HEART RATE: 86 BPM | SYSTOLIC BLOOD PRESSURE: 109 MMHG | RESPIRATION RATE: 18 BRPM

## 2024-07-11 DIAGNOSIS — R56.9 SEIZURE: ICD-10-CM

## 2024-07-11 DIAGNOSIS — M54.50 CHRONIC BILATERAL LOW BACK PAIN, UNSPECIFIED WHETHER SCIATICA PRESENT: ICD-10-CM

## 2024-07-11 DIAGNOSIS — Q85.01 NEUROFIBROMATOSIS, TYPE 1 (VON RECKLINGHAUSEN'S DISEASE): Primary | ICD-10-CM

## 2024-07-11 DIAGNOSIS — G43.709 CHRONIC MIGRAINE WITHOUT AURA WITHOUT STATUS MIGRAINOSUS, NOT INTRACTABLE: ICD-10-CM

## 2024-07-11 DIAGNOSIS — G89.29 CHRONIC BILATERAL LOW BACK PAIN, UNSPECIFIED WHETHER SCIATICA PRESENT: ICD-10-CM

## 2024-07-11 DIAGNOSIS — M85.641 CYST OF BONE OF RIGHT HAND: ICD-10-CM

## 2024-07-11 PROCEDURE — 3074F SYST BP LT 130 MM HG: CPT | Mod: CPTII,S$GLB,, | Performed by: STUDENT IN AN ORGANIZED HEALTH CARE EDUCATION/TRAINING PROGRAM

## 2024-07-11 PROCEDURE — 3008F BODY MASS INDEX DOCD: CPT | Mod: CPTII,S$GLB,, | Performed by: STUDENT IN AN ORGANIZED HEALTH CARE EDUCATION/TRAINING PROGRAM

## 2024-07-11 PROCEDURE — 3078F DIAST BP <80 MM HG: CPT | Mod: CPTII,S$GLB,, | Performed by: STUDENT IN AN ORGANIZED HEALTH CARE EDUCATION/TRAINING PROGRAM

## 2024-07-11 PROCEDURE — 99999 PR PBB SHADOW E&M-EST. PATIENT-LVL V: CPT | Mod: PBBFAC,,, | Performed by: STUDENT IN AN ORGANIZED HEALTH CARE EDUCATION/TRAINING PROGRAM

## 2024-07-11 PROCEDURE — 99215 OFFICE O/P EST HI 40 MIN: CPT | Mod: S$GLB,,, | Performed by: STUDENT IN AN ORGANIZED HEALTH CARE EDUCATION/TRAINING PROGRAM

## 2024-07-11 PROCEDURE — 1159F MED LIST DOCD IN RCRD: CPT | Mod: CPTII,S$GLB,, | Performed by: STUDENT IN AN ORGANIZED HEALTH CARE EDUCATION/TRAINING PROGRAM

## 2024-07-11 RX ORDER — LEVETIRACETAM 500 MG/1
500 TABLET ORAL 2 TIMES DAILY
Qty: 60 TABLET | Refills: 5 | Status: SHIPPED | OUTPATIENT
Start: 2024-07-11 | End: 2025-01-07

## 2024-07-11 RX ORDER — UBROGEPANT 100 MG/1
100 TABLET ORAL
Qty: 16 TABLET | Refills: 3 | Status: SHIPPED | OUTPATIENT
Start: 2024-07-11

## 2024-07-11 NOTE — PROGRESS NOTES
F/u seizures and neurofibromatosis, chronic lower back pain.  headaches       Galindo Grant is a 39 y.o. male with a history of multiple medical diagnoses as listed below that presents for evaluation to establish care for his history of seizures and neurofibromatosis.  Patient says that he has been taking medications as directed.  He has been concerned because he has been having increasing presents of neurofibromas on the scan.  He has not had any changes in his level of functioning.  No recent headaches.  He has an upcoming vision examination as he does complain of occasional episodes with vision seems to become less clear than he is accustomed.  He says that he has had a seizure recently despite being compliant with his medications.    Interval History  04/12/2021  Back pains, which are chronic have been his biggest botehr today. He has had CT as recommended. No headaches. He has recently seen his optometrist. No headaches. No seizures recently.    09/29/2021  Been doing well overall with exception of significant pain in his midback and low back.  Pain is so intense that time he feels that he has been unable to move.  No recent seizures.  Gabapentin was stopped because of undesirable side effects.  Other medications have been taking as directed without complaint.    11/16/2022  Concerned for significant worsening pain in his midback and low back radiating down his legs. No new weakness. No new bowel or bladder incontinence. No recent seizures.     4/24/23  Continues to have lower back pain, has not had followup w pain management. Here for MRI spine results. No new weakness. No new bowel or bladder incontinence. No recent seizures.     7/10/24  On keppra 1000mgbid, no seizures. Tolerating med. Worried about cyst on R hand, painful = if can be related to NF.  Continues to have LBP.  Also experiencing migraines - sensitive to light, noise. Can have nausea. Frontal, pulsatile. Difficult to establish baseline. Has  gotten better past few months.     PAST MEDICAL HISTORY:  Past Medical History:   Diagnosis Date    Arthritis     in knee    Dysphagia, oropharyngeal 4/1/2016    Headache(784.0)     Irregular heart beat     Mass of larynx 10/2/2012    Neurofibromatosis syndrome     Neurofibromatosis, type 1 (von Recklinghausen's disease) birth    Neurofibromatosis, type 1 (von Recklinghausen's disease) 7/9/2012    Seizure 11/16/2022       PAST SURGICAL HISTORY:  Past Surgical History:   Procedure Laterality Date    ARM DEBRIDEMENT      NF - right    partial supraglottic laryngectomy  6/15/2012    REMOVAL OF SACRAL NEUROSTIMULATOR DEVICE N/A 3/30/2022    Procedure: REMOVAL, NEUROSTIMULATOR, SACRAL;  Surgeon: KIP Shipley MD;  Location: [facility] OR;  Service: Urology;  Laterality: N/A;  FLUORO NEEDED  RN Pre OP, Covid screen 3-29-22.  C A    REPLACEMENT OF SACRAL NERVE STIMULATOR N/A 12/10/2021    Procedure: REPLACEMENT, NEUROSTIMULATOR, SACRAL;  Surgeon: KIP Shipley MD;  Location: [facility] OR;  Service: Urology;  Laterality: N/A;  [employer]  [facility] SARAH [phone] WILL BE HERE FOR THE CASE PER HANANE ON 12-1-2021 WILL BE HERE  RN Pre Op 11-23-21.  ---COVID NEGATIVE ON 12/8----- C A  IMPLANTS ORDERED ON 12-1-2021       SOCIAL HISTORY:  Social History     Socioeconomic History    Marital status:     Number of children: 1    Years of education: 12   Occupational History    Occupation:      Employer: OTHER   Tobacco Use    Smoking status: Former    Smokeless tobacco: Former   Substance and Sexual Activity    Alcohol use: No    Drug use: No    Sexual activity: Yes     Partners: Female   Social History Narrative    Moved back to [facility] in 2015. Now .      Social Determinants of Health     Financial Resource Strain: Low Risk  (7/10/2024)    Overall Financial Resource Strain (CARDIA)     Difficulty of Paying Living Expenses: Not very hard   Food Insecurity: Unknown (7/10/2024)    Hunger Vital Sign     Worried About  Running Out of Food in the Last Year: Patient declined     Ran Out of Food in the Last Year: Never true   Physical Activity: Unknown (7/10/2024)    Exercise Vital Sign     Days of Exercise per Week: 0 days   Stress: No Stress Concern Present (7/10/2024)    [employer] [place] [employer] - Occupational Stress Questionnaire     Feeling of Stress : Only a little   Housing Stability: Unknown (7/10/2024)    Housing Stability Vital Sign     Unable to Pay for Housing in the Last Year: No       FAMILY HISTORY:  Family History   Problem Relation Name Age of Onset    Cancer Father          neuro fibroma mitosis    Cancer Paternal Uncle          neuro fibroma mitosis    Cancer Cousin          neuro fibroma mitosis       ALLERGIES AND MEDICATIONS: updated and reviewed.  Review of patient's allergies indicates:  No Known Allergies  Current Outpatient Medications   Medication Sig Dispense Refill    acetaminophen (TYLENOL) 500 MG tablet Take 1 tablet (500 mg total) by mouth every 6 (six) hours as needed for Pain. 30 tablet 0    albuterol (PROVENTIL/VENTOLIN HFA) 90 mcg/actuation inhaler Inhale into the lungs.      atorvastatin (LIPITOR) 10 MG tablet Take 1 tablet (10 mg total) by mouth once daily. 90 tablet 1    bacitracin 500 unit/gram Oint Apply topically 2 (two) times daily. 30 g 0    calcium-vitamin D3 (CALCIUM 500 + D) 500 mg(1,250mg) -200 unit per tablet Take 1 tablet by mouth 2 (two) times daily with meals. 180 tablet 3    cyanocobalamin (VITAMIN B-12) 1000 MCG tablet Take 100 mcg by mouth 2 (two) times a day.      cyclobenzaprine (FLEXERIL) 5 MG tablet Take 1 tablet (5 mg total) by mouth nightly as needed for Muscle spasms. 90 tablet 1    HYDROcodone-acetaminophen (NORCO) 7.5-325 mg per tablet Take 1 tablet by mouth every 6 (six) hours as needed for Pain. 28 tablet 0    hydrOXYzine HCl (ATARAX) 25 MG tablet Take 1 tablet (25 mg total) by mouth 3 (three) times daily. DO NOT DRIVE AFTER TAKING MED 90 tablet 5     ibuprofen (ADVIL,MOTRIN) 800 MG tablet Take 1 tablet (800 mg total) by mouth every 6 (six) hours as needed for Pain. 20 tablet 0    levothyroxine sodium (LEVOTHYROXINE ORAL) Take by mouth.      metoprolol tartrate (LOPRESSOR) 25 MG tablet Take 0.5 tablets (12.5 mg total) by mouth 2 (two) times daily. 90 tablet 1    mupirocin (BACTROBAN) 2 % ointment Apply topically 3 (three) times daily. 30 g 0    nitroGLYCERIN (NITROSTAT) 0.4 MG SL tablet Place 1 tablet (0.4 mg total) under the tongue every 5 (five) minutes as needed for Chest pain (Repeat in 5 min if CP persists. Go to ER if CP worsens). 30 tablet 2    rosuvastatin (CRESTOR) 10 MG tablet Take 10 mg by mouth once daily.      simvastatin (ZOCOR) 20 MG tablet Take 20 mg by mouth every evening.      vitamin D (VITAMIN D3) 1000 units Tab Take 1,000 Units by mouth 2 (two) times a day.      baclofen (LIORESAL) 10 MG tablet Take 1 tablet (10 mg total) by mouth 3 (three) times daily. 90 tablet 11    diclofenac sodium (VOLTAREN) 1 % Gel Apply 2 g topically 4 (four) times daily. 100 g 5    FLUoxetine 20 MG capsule Take 1 capsule (20 mg total) by mouth once daily. 90 capsule 3    levETIRAcetam (KEPPRA) 500 MG Tab Take 1 tablet (500 mg total) by mouth 2 (two) times daily. 60 tablet 5    ubrogepant (UBRELVY) 100 mg tablet Take 1 tablet (100 mg total) by mouth as needed for Migraine. Okay to take 2nd tablet (another 100mg) by mouth in 2 hrs, no more than 2 in 24 hrs. 16 tablet 3     No current facility-administered medications for this visit.     Review of Systems   Constitutional: Negative for activity change, fatigue and unexpected weight change.   HENT: Negative for trouble swallowing and voice change.    Eyes: Positive for visual disturbance. Negative for photophobia and pain.   Respiratory: Negative for apnea and shortness of breath.    Cardiovascular: Negative for chest pain and palpitations.   Gastrointestinal: Negative for constipation, nausea and vomiting.    Genitourinary: Negative for difficulty urinating.   Musculoskeletal: Negative for arthralgias, back pain, gait problem, myalgias and neck pain.   Skin: Negative for color change and rash.   Neurological: Positive for seizures. Negative for dizziness, syncope, speech difficulty, weakness, light-headedness, numbness and headaches.   Psychiatric/Behavioral: Negative for agitation, behavioral problems and confusion.       Neurologic Exam     Mental Status   Oriented to person, place, and time.   Registration: recalls 3 of 3 objects.   Attention: normal. Concentration: normal.   Speech: speech is normal   Level of consciousness: alert  Knowledge: good.     Cranial Nerves     CN II   Right visual field deficit: none  Left visual field deficit: none     CN III, IV, VI   Pupils are equal, round, and reactive to light.  Extraocular motions are normal.   Right pupil: Size: 3 mm. Shape: regular.   Left pupil: Size: 3 mm. Shape: regular.   CN III: no CN III palsy  CN VI: no CN VI palsy  Nystagmus: none   Diplopia: none  Ophthalmoparesis: none  Upgaze: normal  Downgaze: normal  Conjugate gaze: present    CN VII   Facial expression full, symmetric.   Right facial weakness: none  Left facial weakness: none    CN VIII   CN VIII normal.     CN XI   CN XI normal.     CN XII   CN XII normal.   Tongue deviation: none    Motor Exam   Muscle bulk: normal  Overall muscle tone: normal  Right arm tone: normal  Left arm tone: normal  Right leg tone: normal  Left leg tone: normal    Gait, Coordination, and Reflexes     Gait  Gait: normal    Coordination   Finger to nose coordination: normal    Tremor   Resting tremor: absent      Physical Exam  Constitutional:       Appearance: He is well-developed.   HENT:      Head: Normocephalic and atraumatic.   Eyes:      Extraocular Movements: EOM normal.      Pupils: Pupils are equal, round, and reactive to light.   Pulmonary:      Effort: Pulmonary effort is normal. No respiratory distress.  "  Musculoskeletal:         General: Normal range of motion.   Neurological:      Mental Status: He is alert and oriented to person, place, and time.      Coordination: Finger-Nose-Finger Test normal.      Gait: Gait is intact.   Psychiatric:         Speech: Speech normal.         Behavior: Behavior normal.         Vitals:    07/11/24 0846   BP: 109/77   BP Location: Left arm   Patient Position: Sitting   BP Method: Medium (Automatic)   Pulse: 86   Resp: 18   Temp: 97.8 °F (36.6 °C)   SpO2: 99%   Weight: 73.1 kg (161 lb 2.5 oz)   Height: 5' 4" (1.626 m)         Assessment & Plan:    Problem List Items Addressed This Visit          Neuro    Neurofibromatosis, type 1 (von Recklinghausen's disease) - Primary    Relevant Medications    levETIRAcetam (KEPPRA) 500 MG Tab    Seizure    Relevant Medications    levETIRAcetam (KEPPRA) 500 MG Tab       Orthopedic    Low back pain    Overview       -follow up with pain clinic.           Relevant Orders    Ambulatory referral/consult to Pain Clinic     Other Visit Diagnoses       Cyst of bone of right hand        Relevant Orders    X-Ray Hand 3 View Bilateral    Chronic migraine without aura without status migrainosus, not intractable        Relevant Medications    ubrogepant (UBRELVY) 100 mg tablet            This is a patient who has a follow up to me w neurofibramatosis with seizures well controlled, on keppra 1000mg BID. Has chronic lower back pain unchanged for years and had repeat MRI of thoracic and lumbar spine recently obtained which did not show significant changes nor significant canal stenosis. Continues to have pain, to reestablish with a pain management at this time given patient is interested in further intervention. Discussed lifestyle modifications.    Patient with chronic migraines, we will do Ubrelvy p.r.n..  Contraindication to triptan given he has a neurofibromatosis.    Cyst on the right hand, dorsal aspect.  We will get x-ray to evaluate, discussed could be " be related to the NF.  Consider hand surgeon versus derm for cyst removal with appropriate.    Continue Keppra 1000 mg twice a day, seizure precautions discussed.  Patient tolerating.    Questions answered.     Patient was instructed about the following seizure precautions:   - Take all medications as prescribed by your doctor. Specifically take your anti-epileptic drugs at timed intervals that are on the prescription label.  - Do not drive or operate heavy machinery for a state-law specific period of time from seizure activity, 6 months from last seizure  - If you ride a bicycle or any other manually propelled device, please use a helmet  - Never swim alone or without close supervision  - Use showers only; do not take a bath or put yourself in a situation where loss of responsiveness could lead to drowning  - Only cook under supervision. Use the back burners only.  - Do not hold a child or carry an infant. If breastfeeding, only perform this in a seated position with cushioning.   - Do not engage in any activity that in the event of a seizure or loss of responsiveness could lead to any type of bodily injury to yourself or others    Follow up in 3 months, okay for virtual    Time spent on this encounter: 40 minutes. This includes face to face time and non-face to face time preparing to see the patient (eg, review of tests), obtaining and/or reviewing separately obtained history, documenting clinical information in the electronic or other health record, independently interpreting results and communicating results to the patient/family/caregiver, or care coordinator.

## 2024-07-16 ENCOUNTER — HOSPITAL ENCOUNTER (OUTPATIENT)
Dept: RADIOLOGY | Facility: HOSPITAL | Age: 39
Discharge: HOME | End: 2024-07-16
Attending: STUDENT IN AN ORGANIZED HEALTH CARE EDUCATION/TRAINING PROGRAM
Payer: MEDICARE

## 2024-07-16 DIAGNOSIS — M85.641 CYST OF BONE OF RIGHT HAND: ICD-10-CM

## 2024-07-16 PROCEDURE — 73130 X-RAY EXAM OF HAND: CPT | Mod: TC,50,FY

## 2024-07-16 PROCEDURE — 73130 X-RAY EXAM OF HAND: CPT | Mod: 26,50,, | Performed by: RADIOLOGY

## 2024-07-25 DIAGNOSIS — M25.562 PAIN IN BOTH KNEES, UNSPECIFIED CHRONICITY: Primary | ICD-10-CM

## 2024-07-25 DIAGNOSIS — M25.561 PAIN IN BOTH KNEES, UNSPECIFIED CHRONICITY: Primary | ICD-10-CM

## 2024-07-31 ENCOUNTER — OFFICE VISIT (OUTPATIENT)
Dept: HEMATOLOGY/ONCOLOGY | Facility: CLINIC | Age: 39
End: 2024-07-31
Payer: MEDICARE

## 2024-07-31 VITALS
SYSTOLIC BLOOD PRESSURE: 118 MMHG | OXYGEN SATURATION: 97 % | WEIGHT: 158.75 LBS | HEART RATE: 75 BPM | HEIGHT: 64 IN | DIASTOLIC BLOOD PRESSURE: 75 MMHG | BODY MASS INDEX: 27.1 KG/M2

## 2024-07-31 DIAGNOSIS — Q85.01 NEUROFIBROMATOSIS, TYPE 1 (VON RECKLINGHAUSEN'S DISEASE): ICD-10-CM

## 2024-07-31 DIAGNOSIS — D58.2 ELEVATED HEMOGLOBIN: Primary | ICD-10-CM

## 2024-07-31 DIAGNOSIS — Z87.898 HISTORY OF SEIZURE: ICD-10-CM

## 2024-07-31 DIAGNOSIS — R91.8 PULMONARY NODULES: ICD-10-CM

## 2024-07-31 PROCEDURE — 99214 OFFICE O/P EST MOD 30 MIN: CPT | Mod: S$GLB,,, | Performed by: INTERNAL MEDICINE

## 2024-07-31 PROCEDURE — 3078F DIAST BP <80 MM HG: CPT | Mod: CPTII,S$GLB,, | Performed by: INTERNAL MEDICINE

## 2024-07-31 PROCEDURE — 3008F BODY MASS INDEX DOCD: CPT | Mod: CPTII,S$GLB,, | Performed by: INTERNAL MEDICINE

## 2024-07-31 PROCEDURE — 99999 PR PBB SHADOW E&M-EST. PATIENT-LVL IV: CPT | Mod: PBBFAC,,, | Performed by: INTERNAL MEDICINE

## 2024-07-31 PROCEDURE — 3074F SYST BP LT 130 MM HG: CPT | Mod: CPTII,S$GLB,, | Performed by: INTERNAL MEDICINE

## 2024-07-31 NOTE — PROGRESS NOTES
PATIENT: Galindo Grant  MRN: [id number]  DATE: 2024      Diagnosis:   1. Elevated hemoglobin    2. Neurofibromatosis, type 1 (von Recklinghausen's disease)    3. Seizure    4. Pulmonary nodules            Chief Complaint: FOLLOW UP      Subjective:     Interval History: Mr. Grant is a 39 y.o. male with Neurofibromatosis, arthritis, seizures who presents for follow up for elevated hematocrit.  Since the last clinci visit the patient underwent laboratory evaluation on 21.  Iron studies showed normal ferritin and TIBC.  Pt was negative for mutations with BCR/ABL, Jake2, MPL, and CALR.  EPO level was normal at 11.8mIU/mL.  Serum testosterone was normal at 822ng/mL.  The patient complains of chronic back pain from arthritis.  He complains of fatigue and occasional left sided pleuritic pain.  The patient denies cough, SOB, abdominal pain, N/V, constipation, diarrhea.  The patient denies fever, chills, night sweats, weight loss, new lumps or bumps, easy bruising or bleeding.  He states he last used testosterone at the end of .  Hx of testosterone supp use last yr pill form O[facility] at [facility] ( pt reports was on it for 2 yrs) Pt  Followed by Pulmonology for pulmonary nodules. Pt's father  of NF-associated lung tumor, per pt. 2020 and 2021 chest CTs (in [facility] system) showed unchanged small HUSAM pleural based nodule and right perifussural nodule.     Interval Hx;   Doing well  Appetite and weight stable  He is f/b Pulmonology for PATRICIA   Mild  fatigue  CBC 24 shows Hb 16.5g/dL  Hct 49.4     Pt f/b pulmonology for PATRICIA on cpap  and pulmonary nodules       He endorses compliance with Keppra    Former smoker remote past   Past Medical History:   Past Medical History:   Diagnosis Date    Arthritis     in knee    Dysphagia, oropharyngeal 2016    Headache(784.0)     Irregular heart beat     Mass of larynx 10/2/2012    Neurofibromatosis syndrome     Neurofibromatosis, type 1 (von  Recklinghausen's disease) birth    Neurofibromatosis, type 1 (von Recklinghausen's disease) 7/9/2012    Seizure 11/16/2022       Past Surgical HIstory:   Past Surgical History:   Procedure Laterality Date    ARM DEBRIDEMENT      NF - right    partial supraglottic laryngectomy  6/15/2012    REMOVAL OF SACRAL NEUROSTIMULATOR DEVICE N/A 3/30/2022    Procedure: REMOVAL, NEUROSTIMULATOR, SACRAL;  Surgeon: KIP Shipley MD;  Location: [facility] OR;  Service: Urology;  Laterality: N/A;  FLUORO NEEDED  RN Pre OP, Covid screen 3-29-22.  C A    REPLACEMENT OF SACRAL NERVE STIMULATOR N/A 12/10/2021    Procedure: REPLACEMENT, NEUROSTIMULATOR, SACRAL;  Surgeon: KIP Shipley MD;  Location: [facility] OR;  Service: Urology;  Laterality: N/A;  [employer]  MICHELL SMITH [phone] WILL BE HERE FOR THE CASE ARYAN KOO ON 12-1-2021 WILL BE HERE  RN Pre Op 11-23-21.  ---COVID NEGATIVE ON 12/8----- C A  IMPLANTS ORDERED ON 12-1-2021       Family History:   Family History   Problem Relation Name Age of Onset    Cancer Father          neuro fibroma mitosis    Cancer Paternal Uncle          neuro fibroma mitosis    Cancer Cousin          neuro fibroma mitosis       Social History:  reports that he has quit smoking. He has quit using smokeless tobacco. He reports that he does not drink alcohol and does not use drugs.    Allergies:  Review of patient's allergies indicates:  No Known Allergies    Medications:  Current Outpatient Medications   Medication Sig Dispense Refill    acetaminophen (TYLENOL) 500 MG tablet Take 1 tablet (500 mg total) by mouth every 6 (six) hours as needed for Pain. 30 tablet 0    albuterol (PROVENTIL/VENTOLIN HFA) 90 mcg/actuation inhaler Inhale into the lungs.      atorvastatin (LIPITOR) 10 MG tablet Take 1 tablet (10 mg total) by mouth once daily. 90 tablet 1    bacitracin 500 unit/gram Oint Apply topically 2 (two) times daily. 30 g 0    calcium-vitamin D3 (CALCIUM 500 + D) 500 mg(1,250mg) -200 unit per tablet Take 1  tablet by mouth 2 (two) times daily with meals. 180 tablet 3    cyanocobalamin (VITAMIN B-12) 1000 MCG tablet Take 100 mcg by mouth 2 (two) times a day.      cyclobenzaprine (FLEXERIL) 5 MG tablet Take 1 tablet (5 mg total) by mouth nightly as needed for Muscle spasms. 90 tablet 1    HYDROcodone-acetaminophen (NORCO) 7.5-325 mg per tablet Take 1 tablet by mouth every 6 (six) hours as needed for Pain. 28 tablet 0    hydrOXYzine HCl (ATARAX) 25 MG tablet Take 1 tablet (25 mg total) by mouth 3 (three) times daily. DO NOT DRIVE AFTER TAKING MED 90 tablet 5    ibuprofen (ADVIL,MOTRIN) 800 MG tablet Take 1 tablet (800 mg total) by mouth every 6 (six) hours as needed for Pain. 20 tablet 0    levETIRAcetam (KEPPRA) 500 MG Tab Take 1 tablet (500 mg total) by mouth 2 (two) times daily. 60 tablet 5    levothyroxine sodium (LEVOTHYROXINE ORAL) Take by mouth.      metoprolol tartrate (LOPRESSOR) 25 MG tablet Take 0.5 tablets (12.5 mg total) by mouth 2 (two) times daily. 90 tablet 1    mupirocin (BACTROBAN) 2 % ointment Apply topically 3 (three) times daily. 30 g 0    nitroGLYCERIN (NITROSTAT) 0.4 MG SL tablet Place 1 tablet (0.4 mg total) under the tongue every 5 (five) minutes as needed for Chest pain (Repeat in 5 min if CP persists. Go to ER if CP worsens). 30 tablet 2    ubrogepant (UBRELVY) 100 mg tablet Take 1 tablet (100 mg total) by mouth as needed for Migraine. Okay to take 2nd tablet (another 100mg) by mouth in 2 hrs, no more than 2 in 24 hrs. 16 tablet 3    vitamin D (VITAMIN D3) 1000 units Tab Take 1,000 Units by mouth 2 (two) times a day.      baclofen (LIORESAL) 10 MG tablet Take 1 tablet (10 mg total) by mouth 3 (three) times daily. 90 tablet 11    diclofenac sodium (VOLTAREN) 1 % Gel Apply 2 g topically 4 (four) times daily. 100 g 5    FLUoxetine 20 MG capsule Take 1 capsule (20 mg total) by mouth once daily. 90 capsule 3    rosuvastatin (CRESTOR) 10 MG tablet Take 10 mg by mouth once daily.      simvastatin  "(ZOCOR) 20 MG tablet Take 20 mg by mouth every evening.       No current facility-administered medications for this visit.       Review of Systems   Constitutional:  Negative for chills, fatigue, fever and unexpected weight change.   Respiratory:  Negative for cough and shortness of breath.    Cardiovascular:  Negative for chest pain and palpitations.   Gastrointestinal:  Negative for abdominal pain, constipation, diarrhea, nausea and vomiting.   Musculoskeletal:  Positive for back pain (chronic).   Skin:  Negative for color change and rash.   Neurological:  Positive for seizures. Negative for headaches.   Hematological:  Negative for adenopathy. Does not bruise/bleed easily.       ECOG Performance Status: 0   Objective:      Vitals:   Vitals:    07/31/24 0818   BP: 118/75   Pulse: 75   SpO2: 97%   Weight: 72 kg (158 lb 11.7 oz)   Height: 5' 4" (1.626 m)           Physical Exam   Constitutional: He is oriented to person, place, and time. He appears well-developed and well-nourished.   HENT:   Head: Normocephalic.   Eyes: No scleral icterus.   Neck: Normal range of motion. Neck supple. No thyromegaly present.   Cardiovascular: Normal rate, regular rhythm and normal heart sounds.    No murmur heard.  Pulmonary/Chest: Effort normal and breath sounds normal. He has no wheezes. He has no rales.   Abdominal: Soft. Bowel sounds are normal. He exhibits no distension. There is no tenderness. There is no rebound and no guarding.   Musculoskeletal: Normal range of motion. He exhibits no edema.   Lymphadenopathy:     He has no cervical adenopathy.     He has no axillary adenopathy.        Right: No supraclavicular adenopathy present.        Left: No supraclavicular adenopathy present.   Neurological: He is alert and oriented to person, place, and time. No cranial nerve deficit.   Skin: No rash noted. No erythema.   Psychiatric: He has a normal mood and affect.          Laboratory Data:  No visits with results within 1 Week(s) " from this visit.   Latest known visit with results is:   Lab Visit on 07/08/2024   Component Date Value Ref Range Status    WBC 07/08/2024 5.19  3.90 - 12.70 K/uL Final    RBC 07/08/2024 5.55  4.60 - 6.20 M/uL Final    Hemoglobin 07/08/2024 16.5  14.0 - 18.0 g/dL Final    Hematocrit 07/08/2024 49.4  40.0 - 54.0 % Final    MCV 07/08/2024 89  82 - 98 fL Final    MCH 07/08/2024 29.7  27.0 - 31.0 pg Final    MCHC 07/08/2024 33.4  32.0 - 36.0 g/dL Final    RDW 07/08/2024 12.2  11.5 - 14.5 % Final    Platelets 07/08/2024 254  150 - 450 K/uL Final    MPV 07/08/2024 9.6  9.2 - 12.9 fL Final    Immature Granulocytes 07/08/2024 0.4  0.0 - 0.5 % Final    Gran # (ANC) 07/08/2024 2.4  1.8 - 7.7 K/uL Final    Immature Grans (Abs) 07/08/2024 0.02  0.00 - 0.04 K/uL Final    Comment: Mild elevation in immature granulocytes is non specific and   can be seen in a variety of conditions including stress response,   acute inflammation, trauma and pregnancy. Correlation with other   laboratory and clinical findings is essential.      Lymph # 07/08/2024 1.7  1.0 - 4.8 K/uL Final    Mono # 07/08/2024 0.6  0.3 - 1.0 K/uL Final    Eos # 07/08/2024 0.4  0.0 - 0.5 K/uL Final    Baso # 07/08/2024 0.06  0.00 - 0.20 K/uL Final    nRBC 07/08/2024 0  0 /100 WBC Final    Gran % 07/08/2024 47.0  38.0 - 73.0 % Final    Lymph % 07/08/2024 33.5  18.0 - 48.0 % Final    Mono % 07/08/2024 11.0  4.0 - 15.0 % Final    Eosinophil % 07/08/2024 6.9  0.0 - 8.0 % Final    Basophil % 07/08/2024 1.2  0.0 - 1.9 % Final    Differential Method 07/08/2024 Automated   Final    Ferritin 07/08/2024 87  20.0 - 300.0 ng/mL Final    Iron 07/08/2024 128  45 - 160 ug/dL Final    Transferrin 07/08/2024 181 (L)  200 - 375 mg/dL Final    TIBC 07/08/2024 268  250 - 450 ug/dL Final    Saturated Iron 07/08/2024 48  20 - 50 % Final         Imaging: Reviewed       MRI T spine 11/11/2022     Impression:     No focal abnormality thoracic spine.     CT chest w/out contrast 6/13/22    Impression:     Lung-RADS Category:  1-benign appearance or behavior-Continue annual screening with LDCT in 12 months.     Clinically or potentially clinically significant non lung cancer finding:  None.        CT chest w/out contrast 10 /9/23     Impression:     Stable calcified subcentimeter For multiple solid nodules all <6 mm, Fleischner Society 2017 guidelines recommend no routine follow up for a low risk patient, or follow up with non-contrast chest CT at 12 months after discovery in a high risk patient.  Assessment:       1. Elevated hemoglobin    2. Neurofibromatosis, type 1 (von Recklinghausen's disease)    3. Pulmonary nodules    4. History of seizure                 Plan:     Elevated Hematocrit - The patient has 2 hemoglobin values in the last 3 years which were above the threshold of 16.5 to cause concern for polycythemia  -Hemoglobin on 7/22/20 was 18.7g/dL  -Repeat hemoglobin on 4/17/21 was 17g/dL  -The patient has been using testosterone on a regular basis  -His testosterone use may be contributing to his elevated hemoglobin values  -Pt again instructed to avoid testosterone use and that elevated hemoglobin values can predispose to heart disease and strokes  He is no longer on testosterone  - lab work on 4/12/21 showed normal ferritin and TIBC. Pt was negative for mutations with BCR/ABL, Jake2 V617F, MPL, and CALR.  EPO level was normal at 11.8mIU/mL.  Serum testosterone was normal at 822ng/mL  - Jak2 Exon 12 mutationNegative. No pathogenic genetic alterations were detected in  JAK2 , exons 12-15  Negative results for [id number], CALR exon 9, and MPL exon 10   -hemoglobin 16.5 g/dL , stable  - secondary to PATRICIA  asymptomatic  Cont to monitor counts      Neurofibromatosis - PT is being followed by Dr Otero in Neurology  -PT at increased risk of neural sheath tumors.    Hx of Seizure - last seizure last month  Endorses compliance with Keppra  -Management per neurology      Pulmonary Nodules  F/b  pulmonology   Small nodules unchnaged over 6 months between August 2020 and February 2021.  CT chest  6/13/22  Lung-RADS Category:  1-benign appearance   Follow up CT [id number]/9/23  Stable calcified subcentimeter For multiple solid nodules all <6 mm  monitor        CBC,1 week  prior to f/u  in 6mos

## 2024-08-02 ENCOUNTER — OFFICE VISIT (OUTPATIENT)
Dept: ORTHOPEDICS | Facility: CLINIC | Age: 39
End: 2024-08-02
Payer: MEDICARE

## 2024-08-02 VITALS — BODY MASS INDEX: 27.1 KG/M2 | HEIGHT: 64 IN | WEIGHT: 158.75 LBS

## 2024-08-02 DIAGNOSIS — M25.561 PAIN IN BOTH KNEES, UNSPECIFIED CHRONICITY: Primary | ICD-10-CM

## 2024-08-02 DIAGNOSIS — M25.562 PAIN IN BOTH KNEES, UNSPECIFIED CHRONICITY: Primary | ICD-10-CM

## 2024-08-02 DIAGNOSIS — M23.92 INTERNAL DERANGEMENT OF LEFT KNEE: ICD-10-CM

## 2024-08-02 PROCEDURE — 99999 PR PBB SHADOW E&M-EST. PATIENT-LVL III: CPT | Mod: PBBFAC,,, | Performed by: ORTHOPAEDIC SURGERY

## 2024-08-02 RX ORDER — TRIAMCINOLONE ACETONIDE 40 MG/ML
40 INJECTION, SUSPENSION INTRA-ARTICULAR; INTRAMUSCULAR
Status: DISCONTINUED | OUTPATIENT
Start: 2024-08-02 | End: 2024-08-02 | Stop reason: HOSPADM

## 2024-08-02 RX ADMIN — TRIAMCINOLONE ACETONIDE 40 MG: 40 INJECTION, SUSPENSION INTRA-ARTICULAR; INTRAMUSCULAR at 09:08

## 2024-08-02 NOTE — PROCEDURES
Large Joint Aspiration/Injection: L knee    Date/Time: 8/2/2024 9:40 AM    Performed by: Sanchez Gomez MD  Authorized by: Sanchez Gomez MD    Consent Done?:  Yes (Verbal)  Indications:  Pain  Prep: patient was prepped and draped in usual sterile fashion      Local anesthesia used?: Yes    Anesthesia:  Local infiltration  Local anesthetic:  Topical anesthetic and lidocaine 1% without epinephrine    Details:  Needle Size:  22 G  Approach:  Anterolateral  Location:  Knee  Site:  L knee  Medications:  40 mg triamcinolone acetonide 40 mg/mL  Patient tolerance:  Patient tolerated the procedure well with no immediate complications

## 2024-08-02 NOTE — PROGRESS NOTES
NEW PATIENT ORTHOPAEDIC: Knee    PRIMARY CARE PHYSICIAN: Liyah Spring,    REFERRING PROVIDER: Annelise Zurita, [address]  [place] [address]     ASSESSMENT & PLAN:    Impression:  Left Knee Internal Derangement    Follow Up Plan: PRN     Injection:     Galindo Grant has physical exam evidence of above and wishes to pursue an injection. We discussed alternative non operative modalities including physical therapy, anti-inflammatories, bracing, maintenance of appropriate weight, rest, ambulatory devices. We discussed the risk, benefits, and expectations regarding injection. They have elected to proceed with injection. Should injections fail next step would be MRI. See procedure note for billing purposes.     Non operative care:    Galindo Grant has physical exam evidence of above and wishes to pursue an non-operative care. I am recommending the following: injection    Patient comes in with a multiyear history of having left knee pain.  [facility]  He underwent arthroscopic intervention for meniscal repair versus meniscectomy about a year ago.  Did not do any physical therapy thereafter.  Did not have injection prior to surgical intervention.  He reports the previous MRI obtained prior to that surgery demonstrated a meniscus tear otherwise he is unsure if there was any other findings.  After the surgery was told that they repaired his meniscus and he had some early arthritis of his knee.  Since that surgery he reports his symptoms and pain have not improved and in some ways they become worse.  He is not taking any oral medications for this pain.  His pain is worse at night and with lying down.  Does report some low back pain without miroslava radiculopathy.  He is using kinesiology tape for his knee.  He reports overall no significant feelings of instability.  Does report ongoing mechanical symptoms in his knee.  Clinically his exam is unremarkable.  He  does have some posterior knee pain and pain with terminal extension.  I advised treatment options today which would consist of oral medications, physical therapy, repeat MRI, injection.  He is electing for injection today.  I do not want to start repetitive injections if this provides him short term relief secondary to his age.  But if injections provide long term relief then wonderful.  If he has recurrence of pain or this injection does not help I think obtaining a repeat MRI would be next step.  Assuming no significant new internal derangement may have to look at other sources of pain including his lumbar spine or think about more formal physical therapy.    The patient has been ordered:  Office Intraarticular injection    CONSULTS:   None    ACTIVE PROBLEM LIST  Patient Active Problem List   Diagnosis    Neurofibromatosis, type 1 (von Recklinghausen's disease)    Other diseases of respiratory system, not elsewhere classified    Other symptoms involving respiratory system and chest    Systemic inflammatory response syndrome due to non-infectious process without acute organ dysfunction    Low back pain    Dysphagia, oropharyngeal    Chest pain, atypical    CHEEK (dyspnea on exertion)    Incomplete emptying of bladder    Complex sleep apnea syndrome    Pulmonary nodule    Insomnia    Erythrocytosis    Seizure           SUBJECTIVE    CHIEF COMPLAINT: Knee Pain    HPI:   Galindo Grant is a 39 y.o. male here for evaluation and management of left knee pain. There is not a specific incident that brought about this pain. he has had progressive problems with the knee(s) starting 1 years ago but is now progressing to interfere with activities which include: enjoying hobbies    Currently the pain in the joint is rated at mild with activity. The pain is intermittent and is located in the knee, located posterior and medial. The pain is described as aching and throbbing. Relieving factors include rest and repositioning.      There is associated Clicking and Popping.     Galindo Grant has no additional complaints.     PROGRESSIVE SYMPTOMS:  Pain impacting sleep  Pain worsened by weight bearing    FUNCTIONAL STATUS:   Participate in recreational activities     PREVIOUS TREATMENTS:  Medical: None  Physical Therapy: Activities Modified   Previous Orthopaedic Surgery: Right knee arthroscopsy 2023    REVIEW OF SYSTEMS:  PAIN ASSESSMENT:  See HPI.  MUSCULOSKELETAL: See HPI.  OTHER 10 point review of systems is negative except as stated in HPI above    PAST MEDICAL HISTORY   has a past medical history of Arthritis, Dysphagia, oropharyngeal (4/1/2016), Headache(784.0), Irregular heart beat, Mass of larynx (10/2/2012), Neurofibromatosis syndrome, Neurofibromatosis, type 1 (von Recklinghausen's disease) (birth), Neurofibromatosis, type 1 (von Recklinghausen's disease) (7/9/2012), and Seizure (11/16/2022).     PAST SURGICAL HISTORY   has a past surgical history that includes partial supraglottic laryngectomy (6/15/2012); Arm Debridement; Replacement of sacral nerve stimulator (N/A, 12/10/2021); and Removal of sacral neurostimulator device (N/A, 3/30/2022).     FAMILY HISTORY  family history includes Cancer in his cousin, father, and paternal uncle.     SOCIAL HISTORY   reports that he has quit smoking. He has quit using smokeless tobacco. He reports that he does not drink alcohol and does not use drugs.     ALLERGIES   Review of patient's allergies indicates:  No Known Allergies     MEDICATIONS  Current Outpatient Medications on File Prior to Visit   Medication Sig Dispense Refill    acetaminophen (TYLENOL) 500 MG tablet Take 1 tablet (500 mg total) by mouth every 6 (six) hours as needed for Pain. 30 tablet 0    albuterol (PROVENTIL/VENTOLIN HFA) 90 mcg/actuation inhaler Inhale into the lungs.      atorvastatin (LIPITOR) 10 MG tablet Take 1 tablet (10 mg total) by mouth once daily. 90 tablet 1    bacitracin 500 unit/gram Oint Apply topically  2 (two) times daily. 30 g 0    calcium-vitamin D3 (CALCIUM 500 + D) 500 mg(1,250mg) -200 unit per tablet Take 1 tablet by mouth 2 (two) times daily with meals. 180 tablet 3    cyanocobalamin (VITAMIN B-12) 1000 MCG tablet Take 100 mcg by mouth 2 (two) times a day.      cyclobenzaprine (FLEXERIL) 5 MG tablet Take 1 tablet (5 mg total) by mouth nightly as needed for Muscle spasms. 90 tablet 1    HYDROcodone-acetaminophen (NORCO) 7.5-325 mg per tablet Take 1 tablet by mouth every 6 (six) hours as needed for Pain. 28 tablet 0    hydrOXYzine HCl (ATARAX) 25 MG tablet Take 1 tablet (25 mg total) by mouth 3 (three) times daily. DO NOT DRIVE AFTER TAKING MED 90 tablet 5    ibuprofen (ADVIL,MOTRIN) 800 MG tablet Take 1 tablet (800 mg total) by mouth every 6 (six) hours as needed for Pain. 20 tablet 0    levETIRAcetam (KEPPRA) 500 MG Tab Take 1 tablet (500 mg total) by mouth 2 (two) times daily. 60 tablet 5    levothyroxine sodium (LEVOTHYROXINE ORAL) Take by mouth.      metoprolol tartrate (LOPRESSOR) 25 MG tablet Take 0.5 tablets (12.5 mg total) by mouth 2 (two) times daily. 90 tablet 1    mupirocin (BACTROBAN) 2 % ointment Apply topically 3 (three) times daily. 30 g 0    nitroGLYCERIN (NITROSTAT) 0.4 MG SL tablet Place 1 tablet (0.4 mg total) under the tongue every 5 (five) minutes as needed for Chest pain (Repeat in 5 min if CP persists. Go to ER if CP worsens). 30 tablet 2    rosuvastatin (CRESTOR) 10 MG tablet Take 10 mg by mouth once daily.      simvastatin (ZOCOR) 20 MG tablet Take 20 mg by mouth every evening.      ubrogepant (UBRELVY) 100 mg tablet Take 1 tablet (100 mg total) by mouth as needed for Migraine. Okay to take 2nd tablet (another 100mg) by mouth in 2 hrs, no more than 2 in 24 hrs. 16 tablet 3    vitamin D (VITAMIN D3) 1000 units Tab Take 1,000 Units by mouth 2 (two) times a day.      baclofen (LIORESAL) 10 MG tablet Take 1 tablet (10 mg total) by mouth 3 (three) times daily. 90 tablet 11    diclofenac  "sodium (VOLTAREN) 1 % Gel Apply 2 g topically 4 (four) times daily. 100 g 5    FLUoxetine 20 MG capsule Take 1 capsule (20 mg total) by mouth once daily. 90 capsule 3     No current facility-administered medications on file prior to visit.          PHYSICAL EXAM   height is 5' 4" (1.626 m) and weight is 72 kg (158 lb 11.7 oz).   Body mass index is 27.25 kg/m².      All other systems deferred.  GENERAL:  No acute distress  HABITUS: Normal  GAIT: Non-antalgic  SKIN: Normal  and No erythema, warmth, fluctuance     KNEE EXAM:    left:   Effusion: Minimal joint effusion  TTP: No over Medial/Lateral joint line, MCL, LCL, IT band, Pes bursa. Posterior knee   No crepitus with passive knee ROM  Passive ROM: Extension 0, Flexion 130 (pain with terminal extension)  No pain with manipulation of patella  Stable to varus/valgus stress. No increased laxity to anterior/posterior drawer testing  negative Cesilia's test  No pain with IR/ER rotation of the hip  5/5 strength in knee flexion and extension, ankle plantarflexion and dorsiflexion  Neurovascular Status: Sensation intact to light touch in Sural, Saphenous, SPN, DPN, Tibial nerve distribution  2+ pulse DP/PT, normal capillary refill, foot has normal warmth    DATA:  Diagnostic tests reviewed for today's visit:     4v of the knee radiographs appears normal for age. There is good alignment with no evidence of fracture, bony abnormalities or signficant degenerative changes.  "

## 2024-08-05 ENCOUNTER — HOSPITAL ENCOUNTER (EMERGENCY)
Facility: HOSPITAL | Age: 39
Discharge: HOME | End: 2024-08-05
Attending: STUDENT IN AN ORGANIZED HEALTH CARE EDUCATION/TRAINING PROGRAM
Payer: MEDICARE

## 2024-08-05 VITALS
OXYGEN SATURATION: 98 % | DIASTOLIC BLOOD PRESSURE: 76 MMHG | TEMPERATURE: 98 F | WEIGHT: 160 LBS | HEART RATE: 90 BPM | RESPIRATION RATE: 18 BRPM | BODY MASS INDEX: 27.31 KG/M2 | SYSTOLIC BLOOD PRESSURE: 128 MMHG | HEIGHT: 64 IN

## 2024-08-05 DIAGNOSIS — G89.29 CHRONIC NONINTRACTABLE HEADACHE, UNSPECIFIED HEADACHE TYPE: Primary | ICD-10-CM

## 2024-08-05 DIAGNOSIS — R51.9 CHRONIC NONINTRACTABLE HEADACHE, UNSPECIFIED HEADACHE TYPE: Primary | ICD-10-CM

## 2024-08-05 PROCEDURE — 99284 EMERGENCY DEPT VISIT MOD MDM: CPT | Mod: 25

## 2024-08-07 ENCOUNTER — TELEPHONE (OUTPATIENT)
Dept: PULMONOLOGY | Facility: CLINIC | Age: 39
End: 2024-08-07
Payer: MEDICARE

## 2024-08-07 ENCOUNTER — PATIENT MESSAGE (OUTPATIENT)
Dept: PULMONOLOGY | Facility: CLINIC | Age: 39
End: 2024-08-07
Payer: MEDICARE

## 2024-10-02 DIAGNOSIS — M85.642 OTHER CYST OF BONE, LEFT HAND: Primary | ICD-10-CM

## 2024-10-18 ENCOUNTER — OFFICE VISIT (OUTPATIENT)
Dept: NEUROLOGY | Facility: CLINIC | Age: 39
End: 2024-10-18
Payer: MEDICARE

## 2024-10-18 DIAGNOSIS — G43.709 CHRONIC MIGRAINE WITHOUT AURA WITHOUT STATUS MIGRAINOSUS, NOT INTRACTABLE: ICD-10-CM

## 2024-10-18 DIAGNOSIS — Q85.01 NEUROFIBROMATOSIS, TYPE 1 (VON RECKLINGHAUSEN'S DISEASE): ICD-10-CM

## 2024-10-18 DIAGNOSIS — R56.9 SEIZURE: Primary | ICD-10-CM

## 2024-10-18 DIAGNOSIS — G89.29 OTHER CHRONIC PAIN: ICD-10-CM

## 2024-10-18 PROCEDURE — 99214 OFFICE O/P EST MOD 30 MIN: CPT | Mod: 95,,, | Performed by: STUDENT IN AN ORGANIZED HEALTH CARE EDUCATION/TRAINING PROGRAM

## 2024-10-29 ENCOUNTER — PATIENT MESSAGE (OUTPATIENT)
Dept: NEUROLOGY | Facility: CLINIC | Age: 39
End: 2024-10-29
Payer: MEDICARE

## 2024-10-29 DIAGNOSIS — M25.532 LEFT WRIST PAIN: Primary | ICD-10-CM

## 2024-10-30 ENCOUNTER — OFFICE VISIT (OUTPATIENT)
Dept: ORTHOPEDICS | Facility: CLINIC | Age: 39
End: 2024-10-30
Payer: MEDICARE

## 2024-10-30 ENCOUNTER — APPOINTMENT (OUTPATIENT)
Dept: RADIOLOGY | Facility: HOSPITAL | Age: 39
End: 2024-10-30
Attending: ORTHOPAEDIC SURGERY
Payer: MEDICARE

## 2024-10-30 DIAGNOSIS — M25.532 LEFT WRIST PAIN: Primary | ICD-10-CM

## 2024-10-30 DIAGNOSIS — M25.532 LEFT WRIST PAIN: ICD-10-CM

## 2024-10-30 PROCEDURE — 73110 X-RAY EXAM OF WRIST: CPT | Mod: TC,FY,PN,LT

## 2024-10-30 PROCEDURE — 73110 X-RAY EXAM OF WRIST: CPT | Mod: 26,LT,, | Performed by: RADIOLOGY

## 2024-10-30 PROCEDURE — 99999 PR PBB SHADOW E&M-EST. PATIENT-LVL IV: CPT | Mod: PBBFAC,,, | Performed by: ORTHOPAEDIC SURGERY

## 2024-10-30 RX ORDER — MELOXICAM 15 MG/1
15 TABLET ORAL DAILY
Qty: 30 TABLET | Refills: 0 | Status: SHIPPED | OUTPATIENT
Start: 2024-10-30

## 2024-10-31 DIAGNOSIS — M25.532 LEFT WRIST PAIN: Primary | ICD-10-CM

## 2024-11-05 PROBLEM — G89.29 OTHER CHRONIC PAIN: Status: ACTIVE | Noted: 2024-11-05

## 2024-11-05 RX ORDER — UBROGEPANT 100 MG/1
100 TABLET ORAL
Qty: 16 TABLET | Refills: 3 | Status: SHIPPED | OUTPATIENT
Start: 2024-11-05

## 2024-11-05 RX ORDER — LEVETIRACETAM 500 MG/1
500 TABLET ORAL 2 TIMES DAILY
Qty: 180 TABLET | Refills: 1 | Status: SHIPPED | OUTPATIENT
Start: 2024-11-05 | End: 2025-05-04

## 2024-11-05 NOTE — PROGRESS NOTES
F/u seizures and neurofibromatosis, chronic lower back pain.  headaches       Galindo Grant is a 39 y.o. male with a history of multiple medical diagnoses as listed below that presents for evaluation to establish care for his history of seizures and neurofibromatosis.  Patient says that he has been taking medications as directed.  He has been concerned because he has been having increasing presents of neurofibromas on the scan.  He has not had any changes in his level of functioning.  No recent headaches.  He has an upcoming vision examination as he does complain of occasional episodes with vision seems to become less clear than he is accustomed.  He says that he has had a seizure recently despite being compliant with his medications.    Interval History  04/12/2021  Back pains, which are chronic have been his biggest botehr today. He has had CT as recommended. No headaches. He has recently seen his optometrist. No headaches. No seizures recently.    09/29/2021  Been doing well overall with exception of significant pain in his midback and low back.  Pain is so intense that time he feels that he has been unable to move.  No recent seizures.  Gabapentin was stopped because of undesirable side effects.  Other medications have been taking as directed without complaint.    11/16/2022  Concerned for significant worsening pain in his midback and low back radiating down his legs. No new weakness. No new bowel or bladder incontinence. No recent seizures.     4/24/23  Continues to have lower back pain, has not had followup w pain management. Here for MRI spine results. No new weakness. No new bowel or bladder incontinence. No recent seizures.     7/10/24  On keppra 1000mgbid, no seizures. Tolerating med. Worried about cyst on R hand, painful = if can be related to NF.  Continues to have LBP.  Also experiencing migraines - sensitive to light, noise. Can have nausea. Frontal, pulsatile. Difficult to establish baseline. Has  gotten better past few months.     10/18/24 - continues to have pain from neurofibramatosis.     PAST MEDICAL HISTORY:  Past Medical History:   Diagnosis Date    Arthritis     in knee    Dysphagia, oropharyngeal 4/1/2016    Headache(784.0)     Irregular heart beat     Mass of larynx 10/2/2012    Neurofibromatosis syndrome     Neurofibromatosis, type 1 (von Recklinghausen's disease) birth    Neurofibromatosis, type 1 (von Recklinghausen's disease) 7/9/2012    Seizure 11/16/2022       PAST SURGICAL HISTORY:  Past Surgical History:   Procedure Laterality Date    ARM DEBRIDEMENT      NF - right    partial supraglottic laryngectomy  6/15/2012    REMOVAL OF SACRAL NEUROSTIMULATOR DEVICE N/A 3/30/2022    Procedure: REMOVAL, NEUROSTIMULATOR, SACRAL;  Surgeon: KIP Shipley MD;  Location: [facility] OR;  Service: Urology;  Laterality: N/A;  FLUORO NEEDED  RN Pre OP, Covid screen 3-29-22.  C A    REPLACEMENT OF SACRAL NERVE STIMULATOR N/A 12/10/2021    Procedure: REPLACEMENT, NEUROSTIMULATOR, SACRAL;  Surgeon: KIP Shipley MD;  Location: [facility] OR;  Service: Urology;  Laterality: N/A;  [employer]  [facility] SARAH [phone] WILL BE HERE FOR THE CASE PER HANANE ON 12-1-2021 WILL BE HERE  RN Pre Op 11-23-21.  ---COVID NEGATIVE ON 12/8----- C A  IMPLANTS ORDERED ON 12-1-2021       SOCIAL HISTORY:  Social History     Socioeconomic History    Marital status:     Number of children: 1    Years of education: 12   Occupational History    Occupation:      Employer: OTHER   Tobacco Use    Smoking status: Former    Smokeless tobacco: Former   Substance and Sexual Activity    Alcohol use: No    Drug use: No    Sexual activity: Yes     Partners: Female   Social History Narrative    Moved back to [facility] in 2015. Now .      Social Drivers of Health     Financial Resource Strain: Low Risk  (7/10/2024)    Overall Financial Resource Strain (CARDIA)     Difficulty of Paying Living Expenses: Not very hard   Food Insecurity:  Unknown (7/10/2024)    Hunger Vital Sign     Worried About Running Out of Food in the Last Year: Patient declined     Ran Out of Food in the Last Year: Never true   Physical Activity: Unknown (7/10/2024)    Exercise Vital Sign     Days of Exercise per Week: 0 days   Stress: No Stress Concern Present (7/10/2024)    [employer] [place] [employer] - Occupational Stress Questionnaire     Feeling of Stress : Only a little   Housing Stability: Unknown (7/10/2024)    Housing Stability Vital Sign     Unable to Pay for Housing in the Last Year: No       FAMILY HISTORY:  Family History   Problem Relation Name Age of Onset    Cancer Father          neuro fibroma mitosis    Cancer Paternal Uncle          neuro fibroma mitosis    Cancer Cousin          neuro fibroma mitosis       ALLERGIES AND MEDICATIONS: updated and reviewed.  Review of patient's allergies indicates:  No Known Allergies  Current Outpatient Medications   Medication Sig Dispense Refill    acetaminophen (TYLENOL) 500 MG tablet Take 1 tablet (500 mg total) by mouth every 6 (six) hours as needed for Pain. 30 tablet 0    albuterol (PROVENTIL/VENTOLIN HFA) 90 mcg/actuation inhaler Inhale into the lungs.      atorvastatin (LIPITOR) 10 MG tablet Take 1 tablet (10 mg total) by mouth once daily. 90 tablet 1    bacitracin 500 unit/gram Oint Apply topically 2 (two) times daily. 30 g 0    baclofen (LIORESAL) 10 MG tablet Take 1 tablet (10 mg total) by mouth 3 (three) times daily. 90 tablet 11    calcium-vitamin D3 (CALCIUM 500 + D) 500 mg(1,250mg) -200 unit per tablet Take 1 tablet by mouth 2 (two) times daily with meals. 180 tablet 3    cyanocobalamin (VITAMIN B-12) 1000 MCG tablet Take 100 mcg by mouth 2 (two) times a day.      cyclobenzaprine (FLEXERIL) 5 MG tablet Take 1 tablet (5 mg total) by mouth nightly as needed for Muscle spasms. 90 tablet 1    diclofenac sodium (VOLTAREN) 1 % Gel Apply 2 g topically 4 (four) times daily. 100 g 5    FLUoxetine 20 MG  capsule Take 1 capsule (20 mg total) by mouth once daily. 90 capsule 3    HYDROcodone-acetaminophen (NORCO) 7.5-325 mg per tablet Take 1 tablet by mouth every 6 (six) hours as needed for Pain. 28 tablet 0    hydrOXYzine HCl (ATARAX) 25 MG tablet Take 1 tablet (25 mg total) by mouth 3 (three) times daily. DO NOT DRIVE AFTER TAKING MED 90 tablet 5    ibuprofen (ADVIL,MOTRIN) 800 MG tablet Take 1 tablet (800 mg total) by mouth every 6 (six) hours as needed for Pain. 20 tablet 0    levETIRAcetam (KEPPRA) 500 MG Tab Take 1 tablet (500 mg total) by mouth 2 (two) times daily. 180 tablet 1    levothyroxine sodium (LEVOTHYROXINE ORAL) Take by mouth.      meloxicam (MOBIC) 15 MG tablet Take 1 tablet (15 mg total) by mouth once daily. 30 tablet 0    metoprolol tartrate (LOPRESSOR) 25 MG tablet Take 0.5 tablets (12.5 mg total) by mouth 2 (two) times daily. 90 tablet 1    mupirocin (BACTROBAN) 2 % ointment Apply topically 3 (three) times daily. 30 g 0    nitroGLYCERIN (NITROSTAT) 0.4 MG SL tablet Place 1 tablet (0.4 mg total) under the tongue every 5 (five) minutes as needed for Chest pain (Repeat in 5 min if CP persists. Go to ER if CP worsens). 30 tablet 2    rosuvastatin (CRESTOR) 10 MG tablet Take 10 mg by mouth once daily.      simvastatin (ZOCOR) 20 MG tablet Take 20 mg by mouth every evening.      ubrogepant (UBRELVY) 100 mg tablet Take 1 tablet (100 mg total) by mouth as needed for Migraine. Okay to take 2nd tablet (another 100mg) by mouth in 2 hrs, no more than 2 in 24 hrs. 16 tablet 3    vitamin D (VITAMIN D3) 1000 units Tab Take 1,000 Units by mouth 2 (two) times a day.       No current facility-administered medications for this visit.     Review of Systems   Constitutional: Negative for activity change, fatigue and unexpected weight change.   HENT: Negative for trouble swallowing and voice change.    Eyes: Positive for visual disturbance. Negative for photophobia and pain.   Respiratory: Negative for apnea and  shortness of breath.    Cardiovascular: Negative for chest pain and palpitations.   Gastrointestinal: Negative for constipation, nausea and vomiting.   Genitourinary: Negative for difficulty urinating.   Musculoskeletal: Negative for arthralgias, back pain, gait problem, myalgias and neck pain.   Skin: Negative for color change and rash.   Neurological: Positive for seizures. Negative for dizziness, syncope, speech difficulty, weakness, light-headedness, numbness and headaches.   Psychiatric/Behavioral: Negative for agitation, behavioral problems and confusion.       There were no vitals filed for this visit.    Physical Exam:  General: Not in acute distress. Not ill-appearing.   Psychiatric: Mood normal.        Neurologic Exam   Mental status: oriented to person, place, and time  Attention: Normal. Concentration: normal.  Speech: speech is normal.  Cranial Nerves: Symmetric facies.     Motor exam: moving extremities symmetrically      Assessment & Plan:    Problem List Items Addressed This Visit          Neuro    Neurofibromatosis, type 1 (von Recklinghausen's disease)    Relevant Medications    levETIRAcetam (KEPPRA) 500 MG Tab    Seizure - Primary    Relevant Medications    levETIRAcetam (KEPPRA) 500 MG Tab    Other chronic pain     Other Visit Diagnoses       Chronic migraine without aura without status migrainosus, not intractable        Relevant Medications    ubrogepant (UBRELVY) 100 mg tablet            This is a patient who has a follow up to me w neurofibramatosis with seizures well controlled, on keppra 1000mg BID. Has chronic lower back pain unchanged for years and had repeat MRI of thoracic and lumbar spine recently obtained which did not show significant changes nor significant canal stenosis. Continues to have pain, to reestablish with a pain management at this time given patient is interested in further intervention. Discussed lifestyle modifications.    Patient with chronic migraines, we will do  Ubrelvy p.r.n..  Contraindication to triptan given he has a neurofibromatosis.    Continue Keppra 1000 mg twice a day, seizure precautions discussed.  Patient tolerating.    Questions answered. For NF pain, to reestablish w pain management.     Patient was instructed about the following seizure precautions:   - Take all medications as prescribed by your doctor. Specifically take your anti-epileptic drugs at timed intervals that are on the prescription label.  - Do not drive or operate heavy machinery for a state-law specific period of time from seizure activity, 6 months from last seizure  - If you ride a bicycle or any other manually propelled device, please use a helmet  - Never swim alone or without close supervision  - Use showers only; do not take a bath or put yourself in a situation where loss of responsiveness could lead to drowning  - Only cook under supervision. Use the back burners only.  - Do not hold a child or carry an infant. If breastfeeding, only perform this in a seated position with cushioning.   - Do not engage in any activity that in the event of a seizure or loss of responsiveness could lead to any type of bodily injury to yourself or others    6 months, okay for virtual for seizure management.

## 2024-11-14 NOTE — PROGRESS NOTES
Assessment: 39 y.o. male with L dorsal ganglion cyst     I explained my diagnostic impression and the reasoning behind it in detail, using layman's terms.     Plan:   Pain improving. Discussed MRI, possible surgical excision.  He would like to continue to observe for now.  If he changes his mind and would like to get the MRI he will give us a call to have that arranged.  We will observe numbness    All questions were answered in detail. The patient is in full agreement with the treatment plan and will proceed accordingly.    Chief Complaint   Patient presents with    Left Wrist - Pain     Initial visit (10/30/24): Galindo Grant is a 39 y.o. male who presents today complaining of left wrist pain and ganglion cyst    Duration of symptoms:  1year  Trauma or new activity: no  Pain is constant, worsened with extension of wrist, pushing up out of chair. Does not endorse nighttime pain.    5/10 at best and 7/10 at its worst   Relieving factors: compression, rest   Radicular symptoms: no   Associated symptoms:  swelling.    Prior treatment:  compression wrist brace, occasional NSAIDs  Pain does interfere with activities of daily living .    11/25/24  Patient returns for 4 week follow up of L dorsal ganglion cyst.   Had episode of numbness of entire hand - one occurrence  Still having pain in wrist   Bracing has helped         This is the extent of the patient's complaints at this time.     Hand dominance: Right      Review of patient's allergies indicates:  No Known Allergies      Current Outpatient Medications:     acetaminophen (TYLENOL) 500 MG tablet, Take 1 tablet (500 mg total) by mouth every 6 (six) hours as needed for Pain., Disp: 30 tablet, Rfl: 0    albuterol (PROVENTIL/VENTOLIN HFA) 90 mcg/actuation inhaler, Inhale into the lungs., Disp: , Rfl:     atorvastatin (LIPITOR) 10 MG tablet, Take 1 tablet (10 mg total) by mouth once daily., Disp: 90 tablet, Rfl: 1    bacitracin 500 unit/gram Oint, Apply topically 2  (two) times daily., Disp: 30 g, Rfl: 0    baclofen (LIORESAL) 10 MG tablet, Take 1 tablet (10 mg total) by mouth 3 (three) times daily., Disp: 90 tablet, Rfl: 11    calcium-vitamin D3 (CALCIUM 500 + D) 500 mg(1,250mg) -200 unit per tablet, Take 1 tablet by mouth 2 (two) times daily with meals., Disp: 180 tablet, Rfl: 3    cyanocobalamin (VITAMIN B-12) 1000 MCG tablet, Take 100 mcg by mouth 2 (two) times a day., Disp: , Rfl:     cyclobenzaprine (FLEXERIL) 5 MG tablet, Take 1 tablet (5 mg total) by mouth nightly as needed for Muscle spasms., Disp: 90 tablet, Rfl: 1    diclofenac sodium (VOLTAREN) 1 % Gel, Apply 2 g topically 4 (four) times daily., Disp: 100 g, Rfl: 5    FLUoxetine 20 MG capsule, Take 1 capsule (20 mg total) by mouth once daily., Disp: 90 capsule, Rfl: 3    HYDROcodone-acetaminophen (NORCO) 7.5-325 mg per tablet, Take 1 tablet by mouth every 6 (six) hours as needed for Pain., Disp: 28 tablet, Rfl: 0    hydrOXYzine HCl (ATARAX) 25 MG tablet, Take 1 tablet (25 mg total) by mouth 3 (three) times daily. DO NOT DRIVE AFTER TAKING MED, Disp: 90 tablet, Rfl: 5    ibuprofen (ADVIL,MOTRIN) 800 MG tablet, Take 1 tablet (800 mg total) by mouth every 6 (six) hours as needed for Pain., Disp: 20 tablet, Rfl: 0    levETIRAcetam (KEPPRA) 500 MG Tab, Take 1 tablet (500 mg total) by mouth 2 (two) times daily., Disp: 180 tablet, Rfl: 1    levothyroxine sodium (LEVOTHYROXINE ORAL), Take by mouth., Disp: , Rfl:     meloxicam (MOBIC) 15 MG tablet, Take 1 tablet (15 mg total) by mouth once daily., Disp: 30 tablet, Rfl: 0    metoprolol tartrate (LOPRESSOR) 25 MG tablet, Take 0.5 tablets (12.5 mg total) by mouth 2 (two) times daily., Disp: 90 tablet, Rfl: 1    mupirocin (BACTROBAN) 2 % ointment, Apply topically 3 (three) times daily., Disp: 30 g, Rfl: 0    nitroGLYCERIN (NITROSTAT) 0.4 MG SL tablet, Place 1 tablet (0.4 mg total) under the tongue every 5 (five) minutes as needed for Chest pain (Repeat in 5 min if CP persists.  Go to ER if CP worsens)., Disp: 30 tablet, Rfl: 2    rosuvastatin (CRESTOR) 10 MG tablet, Take 10 mg by mouth once daily., Disp: , Rfl:     simvastatin (ZOCOR) 20 MG tablet, Take 20 mg by mouth every evening., Disp: , Rfl:     ubrogepant (UBRELVY) 100 mg tablet, Take 1 tablet (100 mg total) by mouth as needed for Migraine. Okay to take 2nd tablet (another 100mg) by mouth in 2 hrs, no more than 2 in 24 hrs., Disp: 16 tablet, Rfl: 3    vitamin D (VITAMIN D3) 1000 units Tab, Take 1,000 Units by mouth 2 (two) times a day., Disp: , Rfl:     Physical Exam:   Vitals:    11/25/24 1101   PainSc: 0-No pain   PainLoc: Wrist       General:  Patient is alert, awake and oriented to time, place and person. Mood and affect are appropriate.  Patient does not appear to be in any distress, denies any constitutional symptoms and appears stated age.   HEENT: Pupils are equal and round, sclera are not injected. External examination of ears and nose reveals no abnormalities. Cranial nerves II-X are grossly intact  Skin:  no rashes, abrasions or open wounds on the affected extremity   Resp:  No respiratory distress or audible wheezing   CV: 2+  pulses, all extremities warm and well perfused   Left Wrist   Small ganglion cyst visible only with wrist flexion  Tender to palpation   Pain with extension of wrist   Negative median nerve compression test, Tinel's, Phalen's  No atrophy of thenar eminence  LTSI m/u/r  2+ RP  + EPL, IO, FDS, FDP        Imaging: no new    This note was created by combination of typed  and M-Modal dictation. Transcription and phonetic errors may be present.  If there are any questions, please contact me.    Past Medical History:   Diagnosis Date    Arthritis     in knee    Dysphagia, oropharyngeal 4/1/2016    Headache(784.0)     Irregular heart beat     Mass of larynx 10/2/2012    Neurofibromatosis syndrome     Neurofibromatosis, type 1 (von Recklinghausen's disease) birth    Neurofibromatosis, type 1 (von  Recklinghausen's disease) 7/9/2012    Seizure 11/16/2022       Active Problem List with Overview Notes    Diagnosis Date Noted    Other chronic pain 11/05/2024    Seizure 11/16/2022    Pulmonary nodule 06/09/2022     -followed by Dr. Ball at [facility] for magalie nodule.  CT was originally done at [facility].   -ct at [facility] 6/13/22 with multiple subcentimeter nodule.  Largest is 5 mm rul (3:229).  Unchanged when compared to 2/17/21.  Stability over 16 months and size suggestive of benign etiology.  Further imaging not recommended.      Insomnia 06/09/2022     difficulty with sleep initiation and maintenance.  May relate to poorly control pain +/- untreated karthikeyan.  Will follow with pain manangement.        Erythrocytosis 06/09/2022     followed by Dr. De La Cruz.  Last blood work wnl.  99% on room air.  No evidence of parenchymal lung disease.       Complex sleep apnea syndrome 06/08/2022     -ahi of 25 with csi of 10.  High csi may relate to chronic narcotic.    Currently on asv  Poor compliance.  Residual ahi of 1.8.  Patient is benefiting from asv      Incomplete emptying of bladder 12/10/2021    Chest pain, atypical 12/02/2021    CHEEK (dyspnea on exertion) 12/02/2021    Dysphagia, oropharyngeal 04/01/2016    Neurofibromatosis, type 1 (von Recklinghausen's disease) 07/09/2012    Low back pain 07/09/2012       -follow up with pain clinic.        Other diseases of respiratory system, not elsewhere classified 06/16/2012    Other symptoms involving respiratory system and chest 06/16/2012    Systemic inflammatory response syndrome due to non-infectious process without acute organ dysfunction 06/16/2012       Past Surgical History:   Procedure Laterality Date    ARM DEBRIDEMENT      NF - right    partial supraglottic laryngectomy  6/15/2012    REMOVAL OF SACRAL NEUROSTIMULATOR DEVICE N/A 3/30/2022    Procedure: REMOVAL, NEUROSTIMULATOR, SACRAL;  Surgeon: KIP Shipley MD;  Location: [facility];  Service: Urology;  Laterality: N/A;   FLUORO NEEDED  RN Pre OP, Covid screen 3-29-22.  C A    REPLACEMENT OF SACRAL NERVE STIMULATOR N/A 12/10/2021    Procedure: REPLACEMENT, NEUROSTIMULATOR, SACRAL;  Surgeon: KIP Shipley MD;  Location: [facility];  Service: Urology;  Laterality: N/A;  [employer]  [facility] [phone] WILL BE HERE FOR THE CASE PER HANANE ON 12-1-2021 WILL BE HERE  RN Pre Op 11-23-21.  ---COVID NEGATIVE ON 12/8----- C A  IMPLANTS ORDERED ON 12-1-2021       Family History   Problem Relation Name Age of Onset    Cancer Father          neuro fibroma mitosis    Cancer Paternal Uncle          neuro fibroma mitosis    Cancer Cousin          neuro fibroma mitosis

## 2024-11-17 ENCOUNTER — HOSPITAL ENCOUNTER (EMERGENCY)
Facility: HOSPITAL | Age: 39
Discharge: HOME | End: 2024-11-17
Attending: EMERGENCY MEDICINE
Payer: MEDICARE

## 2024-11-17 VITALS
WEIGHT: 160 LBS | BODY MASS INDEX: 27.46 KG/M2 | HEART RATE: 86 BPM | OXYGEN SATURATION: 95 % | DIASTOLIC BLOOD PRESSURE: 75 MMHG | SYSTOLIC BLOOD PRESSURE: 131 MMHG | TEMPERATURE: 98 F | RESPIRATION RATE: 18 BRPM
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
## 2024-11-17 PROCEDURE — 90715 TDAP VACCINE 7 YRS/> IM: CPT

## 2024-11-17 PROCEDURE — 90471 IMMUNIZATION ADMIN: CPT

## 2024-11-17 PROCEDURE — 63600175 PHARM REV CODE 636 W HCPCS

## 2024-11-17 PROCEDURE — 99283 EMERGENCY DEPT VISIT LOW MDM: CPT | Mod: 25

## 2024-11-17 RX ORDER — AMOXICILLIN AND CLAVULANATE POTASSIUM 875; 125 MG/1; MG/1
1 TABLET, FILM COATED ORAL 2 TIMES DAILY
Qty: 14 TABLET | Refills: 0 | Status: SHIPPED | OUTPATIENT
Start: 2024-11-17 | End: 2024-11-24

## 2024-11-17 RX ADMIN — CLOSTRIDIUM TETANI TOXOID ANTIGEN (FORMALDEHYDE INACTIVATED), CORYNEBACTERIUM DIPHTHERIAE TOXOID ANTIGEN (FORMALDEHYDE INACTIVATED), BORDETELLA PERTUSSIS TOXOID ANTIGEN (GLUTARALDEHYDE INACTIVATED), BORDETELLA PERTUSSIS FILAMENTOUS HEMAGGLUTININ ANTIGEN (FORMALDEHYDE INACTIVATED), BORDETELLA PERTUSSIS PERTACTIN ANTIGEN, AND BORDETELLA PERTUSSIS FIMBRIAE 2/3 ANTIGEN 0.5 ML: 5; 2; 2.5; 5; 3; 5 INJECTION, SUSPENSION INTRAMUSCULAR at 01:11

## 2024-11-17 NOTE — DISCHARGE INSTRUCTIONS
Thank you for coming to our Emergency Department today. It is important to remember that some problems or medical conditions are difficult to diagnose and may not be found or addressed during your Emergency Department visit.  These conditions often start with non-specific symptoms and can only be diagnosed on follow up visits with your primary care physician or specialist when the symptoms continue or change. Please remember that all medical conditions can change, and we cannot predict how you will be feeling tomorrow or the next day. Return to the ER with any questions/concerns, new/concerning symptoms, worsening or failure to improve.       Be sure to follow up with your primary care doctor and review all labs/imaging/tests that were performed during your ER visit with them. It is very common for us to identify non-emergent incidental findings which must be followed up with your primary care physician.  Some labs/imaging/tests may be outside of the normal range, and require non-emergent follow-up and/or further investigation/treatment/procedures/testing to help diagnose/exclude/prevent complications or other potentially serious medical conditions. Some abnormalities may not have been discussed or addressed during your ER visit.     An ER visit does not replace a primary care visit, and many screening tests or follow-up tests cannot be ordered by an ER doctor or performed by the ER. Some tests may even require pre-approval.    If you do not have a primary care doctor, you may contact the one listed on your discharge paperwork or you may also call the [facility] Appointment Desk at [phone] , or [address] [place] [address] at  [phone] to schedule an appointment, or establish care with a primary care doctor or even a specialist and to obtain information about local resources. It is important to your health that you have a primary care doctor.    Please take all medications as directed. We have done our best to select  a medication for you that will treat your condition however, all medications may potentially have side-effects and it is impossible to predict which medications may give you side-effects or what those side-effects (if any) those medications may give you.  If you feel that you are having a negative effect or side-effect of any medication you should stop taking those medications immediately and seek medical attention. If you feel that you are having a life-threatening reaction call 911.        Do not drive, swim, climb to height, take a bath, operate heavy machinery, drink alcohol or take potentially sedating medications, sign any legal documents or make any important decisions for 24 hours if you have received any pain medications, sedatives or mood altering drugs during your ER visit or within 24 hours of taking them if they have been prescribed to you.     You can find additional resources for Dentists, hearing aids, durable medical equipment, low cost pharmacies and other resources at [email or web address]

## 2024-11-18 NOTE — ED PROVIDER NOTES
"Encounter Date: 11/17/2024       History     Chief Complaint   Patient presents with    Animal Bite     Pt to ED reporting dog bite to left side of jaw x yesterday. Pt states hi sister's dog bit him "on accident". Dogs shots are up to date. Pt has a hx of seizures. Last seizure "a few nights ago".      Patient is a 39-year-old male with a past medical history of seizures, neurofibromatosis who presents to the emergency department with complaints of a dog bite.  Patient states that his sister's dog bit him in the face yesterday.  He states that went to go pick a box near the dog and the dog bit him.  He has been taking Tylenol at home to help with the pain.  He states that the dog is up-to-date on all of its vaccinations.  He denies nausea, vomiting, fever, chills, weakness, numbness or tingling.        Review of patient's allergies indicates:  No Known Allergies  Past Medical History:   Diagnosis Date    Arthritis     in knee    Disorder of thyroid, unspecified     Dysphagia, oropharyngeal 04/01/2016    Headache(784.0)     Irregular heart beat     Mass of larynx 10/02/2012    Neurofibromatosis syndrome     Neurofibromatosis, type 1 (von Recklinghausen's disease) birth    Neurofibromatosis, type 1 (von Recklinghausen's disease) 07/09/2012    Seizure 11/16/2022     Past Surgical History:   Procedure Laterality Date    ARM DEBRIDEMENT      NF - right    partial supraglottic laryngectomy  6/15/2012    REMOVAL OF SACRAL NEUROSTIMULATOR DEVICE N/A 3/30/2022    Procedure: REMOVAL, NEUROSTIMULATOR, SACRAL;  Surgeon: KIP Shipley MD;  Location: [facility] OR;  Service: Urology;  Laterality: N/A;  FLUORO NEEDED  RN Pre OP, Covid screen 3-29-22.  C A    REPLACEMENT OF SACRAL NERVE STIMULATOR N/A 12/10/2021    Procedure: REPLACEMENT, NEUROSTIMULATOR, SACRAL;  Surgeon: KIP Shipley MD;  Location: [facility] OR;  Service: Urology;  Laterality: N/A;  [facility]  MICHELL SMITH [phone] WILL BE HERE FOR THE CASE PER HANANE ON " 12-1-2021 WILL BE HERE  RN Pre Op 11-23-21.  ---COVID NEGATIVE ON 12/8----- C A  IMPLANTS ORDERED ON 12-1-2021     Family History   Problem Relation Name Age of Onset    Cancer Father          neuro fibroma mitosis    Cancer Paternal Uncle          neuro fibroma mitosis    Cancer Cousin          neuro fibroma mitosis     Social History     Tobacco Use    Smoking status: Former    Smokeless tobacco: Former   Substance Use Topics    Alcohol use: No    Drug use: No     Review of Systems   Constitutional:  Negative for chills and fever.   Respiratory:  Negative for chest tightness and shortness of breath.    Cardiovascular:  Negative for chest pain and leg swelling.   Gastrointestinal:  Negative for abdominal pain and nausea.   Skin:  Positive for wound.   Neurological:  Negative for dizziness and weakness.       Physical Exam     Initial Vitals [11/17/24 1309]   BP Pulse Resp Temp SpO2   131/75 86 18 98.3 °F (36.8 °C) 95 %      MAP       --         Physical Exam    Nursing note and vitals reviewed.  Constitutional: He appears well-developed and well-nourished. He is not diaphoretic. No distress.   HENT:   Head: Normocephalic and atraumatic.   Right Ear: External ear normal.   Left Ear: External ear normal.   Eyes: Conjunctivae and EOM are normal. Right eye exhibits no discharge. Left eye exhibits no discharge.   Neck: No tracheal deviation present. No JVD present.   Cardiovascular:  Normal rate.           Pulmonary/Chest: No stridor. No respiratory distress.   Abdominal: There is no abdominal tenderness.     Neurological: He is alert and oriented to person, place, and time.   Skin: Skin is warm. No rash noted.   Three linear abrasions to left face below the left earlobe  No active bleeding, surrounding redness   Psychiatric: He has a normal mood and affect. Thought content normal.         ED Course   Procedures  Labs Reviewed - No data to display       Imaging Results    None          Medications   Tdap vaccine  injection 0.5 mL (0.5 mLs Intramuscular Given 11/17/24 8664)     Medical Decision Making  Patient is a 39-year-old male with a past medical history of seizures, neurofibromatosis who presents to the emergency department with complaints of a dog bite.  Patient states that his sister's dog bit him in the face yesterday.  He states that went to go pick a box near the dog and the dog bit him.  He has been taking Tylenol at home to help with the pain.  He states that the dog is up-to-date on all of its vaccinations.  He denies nausea, vomiting, fever, chills, weakness, numbness or tingling.    Differentials include but are not limited to abrasion, laceration, cellulitis, nerve injury.    Patient's physical exam most consistent with multiple abrasions to the face.  Irrigated the wounds with normal saline and iodine thoroughly.  No indication for repair at this time.  Discussed with the patient to keep the wounds clean.  We will discharge the patient with a prescription for Augmentin.  Animal control contacted regarding the animal bite. I discussed with the patient the diagnosis, treatment plan, indications for return to the emergency department, and for expected follow-up. The patient verbalized an understanding. The patient is asked if there are any questions or concerns. We discuss the case, until all issues are addressed to the patient's satisfaction. Patient understands and is agreeable to the plan.     Risk  Prescription drug management.               ED Course as of 11/17/24 1946   Sun Nov 17, 2024   1337 BP: 131/75 [VC]   1337 Temp: 98.3 °F (36.8 °C) [VC]   1337 Temp Source: Oral [VC]   1337 Pulse: 86 [VC]   1337 Resp: 18 [VC]   1337 SpO2: 95 % [VC]      ED Course User Index  [VC] Sang Healy DNP                           Clinical Impression:  Final diagnoses:  [W54.0XXA] Dog bite, initial encounter (Primary)          ED Disposition Condition    Discharge Stable          ED Prescriptions       Medication  Sig Dispense Start Date End Date Auth. Provider    amoxicillin-clavulanate 875-125mg (AUGMENTIN) 875-125 mg per tablet Take 1 tablet by mouth 2 (two) times daily. for 7 days 14 tablet 11/17/2024 11/24/2024 Cheryl Fallon PA-C          Follow-up Information       Follow up With Specialties Details Why Contact Info    [facility] Emergency Medicine Go to  for new or worsening symptoms [address] [facility] [place] [address] [phone]    Liyah Spring, DO Family Medicine  For wound re-check [address] [facility] [address]  [phone]               Cheryl Fallon PA-C  11/17/24 1946

## 2024-11-25 ENCOUNTER — OFFICE VISIT (OUTPATIENT)
Dept: ORTHOPEDICS | Facility: CLINIC | Age: 39
End: 2024-11-25
Payer: MEDICARE

## 2024-11-25 DIAGNOSIS — M67.432 GANGLION CYST OF DORSUM OF LEFT WRIST: Primary | ICD-10-CM

## 2024-11-25 PROCEDURE — 99213 OFFICE O/P EST LOW 20 MIN: CPT | Mod: S$GLB,,, | Performed by: ORTHOPAEDIC SURGERY

## 2024-11-25 PROCEDURE — 99999 PR PBB SHADOW E&M-EST. PATIENT-LVL III: CPT | Mod: PBBFAC,,, | Performed by: ORTHOPAEDIC SURGERY

## 2024-11-25 PROCEDURE — 1159F MED LIST DOCD IN RCRD: CPT | Mod: CPTII,S$GLB,, | Performed by: ORTHOPAEDIC SURGERY

## 2024-11-25 PROCEDURE — 1160F RVW MEDS BY RX/DR IN RCRD: CPT | Mod: CPTII,S$GLB,, | Performed by: ORTHOPAEDIC SURGERY

## 2024-12-19 ENCOUNTER — OFFICE VISIT (OUTPATIENT)
Dept: ORTHOPEDICS | Facility: CLINIC | Age: 39
End: 2024-12-19
Payer: MEDICARE

## 2024-12-19 VITALS — HEIGHT: 64 IN | WEIGHT: 160.06 LBS | BODY MASS INDEX: 27.33 KG/M2

## 2024-12-19 DIAGNOSIS — M23.92 INTERNAL DERANGEMENT OF LEFT KNEE: Primary | ICD-10-CM

## 2024-12-19 PROCEDURE — 1159F MED LIST DOCD IN RCRD: CPT | Mod: CPTII,S$GLB,, | Performed by: ORTHOPAEDIC SURGERY

## 2024-12-19 PROCEDURE — 3008F BODY MASS INDEX DOCD: CPT | Mod: CPTII,S$GLB,, | Performed by: ORTHOPAEDIC SURGERY

## 2024-12-19 PROCEDURE — 99213 OFFICE O/P EST LOW 20 MIN: CPT | Mod: S$GLB,,, | Performed by: ORTHOPAEDIC SURGERY

## 2024-12-19 PROCEDURE — 99999 PR PBB SHADOW E&M-EST. PATIENT-LVL IV: CPT | Mod: PBBFAC,,, | Performed by: ORTHOPAEDIC SURGERY

## 2024-12-19 RX ORDER — METHYLPREDNISOLONE 4 MG/1
TABLET ORAL
Qty: 1 EACH | Refills: 0 | Status: SHIPPED | OUTPATIENT
Start: 2024-12-19

## 2024-12-19 NOTE — PROGRESS NOTES
EST PATIENT ORTHOPAEDIC: Knee    PRIMARY CARE PHYSICIAN: Liyah Srping DO   REFERRING PROVIDER: No referring provider defined for this encounter.     ASSESSMENT & PLAN:    Impression:  Left Knee Internal Derangement    Follow Up Plan: After MRI    Non operative care:    Galindo Grant has physical exam evidence of above and wishes to pursue an non-operative care. I am recommending the following: MRI    To review:  Patient comes in with a multiyear history of having left knee pain.  [facility]  He underwent arthroscopic intervention for meniscal repair versus meniscectomy about a year ago.  Did not do any physical therapy thereafter.  Did not have injection prior to surgical intervention.  He reports the previous MRI obtained prior to that surgery demonstrated a meniscus tear otherwise he is unsure if there was any other findings.  After the surgery was told that they repaired his meniscus and he had some early arthritis of his knee.  Since that surgery he reports his symptoms and pain have not improved and in some ways they become worse.  He is not taking any oral medications for this pain.  His pain is worse at night and with lying down.  Does report some low back pain without miroslava radiculopathy.  He is using kinesiology tape for his knee.  He reports overall no significant feelings of instability.  Does report ongoing mechanical symptoms in his knee.  Clinically his exam is unremarkable.  He does have some posterior knee pain and pain with terminal extension. Previous injection did not help significantly. So I think a repeat MRI would be next step.  Assuming no significant new internal derangement may have to look at other sources of pain including his lumbar spine or think about more formal physical therapy.    The patient has been ordered:  MRI    CONSULTS:   None    ACTIVE PROBLEM LIST  Patient Active Problem List   Diagnosis    Neurofibromatosis, type 1  (von Recklinghausen's disease)    Other diseases of respiratory system, not elsewhere classified    Other symptoms involving respiratory system and chest    Systemic inflammatory response syndrome due to non-infectious process without acute organ dysfunction    Low back pain    Dysphagia, oropharyngeal    Chest pain, atypical    CHEEK (dyspnea on exertion)    Incomplete emptying of bladder    Complex sleep apnea syndrome    Pulmonary nodule    Insomnia    Erythrocytosis    Seizure    Other chronic pain           SUBJECTIVE    CHIEF COMPLAINT: Knee Pain    HPI:   Galindo Grant is a 39 y.o. male here for evaluation and management of left knee pain. There is not a specific incident that brought about this pain. he has had progressive problems with the knee(s) starting 1 years ago but is now progressing to interfere with activities which include: enjoying hobbies    Currently the pain in the joint is rated at mild with activity. The pain is intermittent and is located in the knee, located posterior and medial. The pain is described as aching and throbbing. Relieving factors include rest and repositioning.     There is associated Clicking and Popping.     Galindo Grant has no additional complaints.     12/19/24:  Patient comes in today with ongoing left knee pain.  Previous injection done did not help significantly with his pain.  Continues to be an nighttime pain worse with lying down.  Again previous arthroscopic surgery did not provide significant relief to his knee.  He has ongoing mechanical symptoms.    PROGRESSIVE SYMPTOMS:  Pain impacting sleep  Pain worsened by weight bearing    FUNCTIONAL STATUS:   Participate in recreational activities     PREVIOUS TREATMENTS:  Medical: None  Physical Therapy: Activities Modified   Previous Orthopaedic Surgery: Right knee arthroscopsy 2023    REVIEW OF SYSTEMS:  PAIN ASSESSMENT:  See HPI.  MUSCULOSKELETAL: See HPI.  OTHER 10 point review of systems is negative except as  stated in HPI above    PAST MEDICAL HISTORY   has a past medical history of Arthritis, Disorder of thyroid, unspecified, Dysphagia, oropharyngeal (04/01/2016), Headache(784.0), Irregular heart beat, Mass of larynx (10/02/2012), Neurofibromatosis syndrome, Neurofibromatosis, type 1 (von Recklinghausen's disease) (birth), Neurofibromatosis, type 1 (von Recklinghausen's disease) (07/09/2012), and Seizure (11/16/2022).     PAST SURGICAL HISTORY   has a past surgical history that includes partial supraglottic laryngectomy (6/15/2012); Arm Debridement; Replacement of sacral nerve stimulator (N/A, 12/10/2021); and Removal of sacral neurostimulator device (N/A, 3/30/2022).     FAMILY HISTORY  family history includes Cancer in his cousin, father, and paternal uncle.     SOCIAL HISTORY   reports that he has quit smoking. He has quit using smokeless tobacco. He reports that he does not drink alcohol and does not use drugs.     ALLERGIES   Review of patient's allergies indicates:  No Known Allergies     MEDICATIONS  Current Outpatient Medications on File Prior to Visit   Medication Sig Dispense Refill    acetaminophen (TYLENOL) 500 MG tablet Take 1 tablet (500 mg total) by mouth every 6 (six) hours as needed for Pain. 30 tablet 0    albuterol (PROVENTIL/VENTOLIN HFA) 90 mcg/actuation inhaler Inhale into the lungs.      atorvastatin (LIPITOR) 10 MG tablet Take 1 tablet (10 mg total) by mouth once daily. 90 tablet 1    bacitracin 500 unit/gram Oint Apply topically 2 (two) times daily. 30 g 0    calcium-vitamin D3 (CALCIUM 500 + D) 500 mg(1,250mg) -200 unit per tablet Take 1 tablet by mouth 2 (two) times daily with meals. 180 tablet 3    cyanocobalamin (VITAMIN B-12) 1000 MCG tablet Take 100 mcg by mouth 2 (two) times a day.      cyclobenzaprine (FLEXERIL) 5 MG tablet Take 1 tablet (5 mg total) by mouth nightly as needed for Muscle spasms. 90 tablet 1    HYDROcodone-acetaminophen (NORCO) 7.5-325 mg per tablet Take 1 tablet by  "mouth every 6 (six) hours as needed for Pain. 28 tablet 0    hydrOXYzine HCl (ATARAX) 25 MG tablet Take 1 tablet (25 mg total) by mouth 3 (three) times daily. DO NOT DRIVE AFTER TAKING MED 90 tablet 5    ibuprofen (ADVIL,MOTRIN) 800 MG tablet Take 1 tablet (800 mg total) by mouth every 6 (six) hours as needed for Pain. 20 tablet 0    levETIRAcetam (KEPPRA) 500 MG Tab Take 1 tablet (500 mg total) by mouth 2 (two) times daily. 180 tablet 1    levothyroxine sodium (LEVOTHYROXINE ORAL) Take by mouth.      meloxicam (MOBIC) 15 MG tablet Take 1 tablet (15 mg total) by mouth once daily. 30 tablet 0    metoprolol tartrate (LOPRESSOR) 25 MG tablet Take 0.5 tablets (12.5 mg total) by mouth 2 (two) times daily. 90 tablet 1    mupirocin (BACTROBAN) 2 % ointment Apply topically 3 (three) times daily. 30 g 0    nitroGLYCERIN (NITROSTAT) 0.4 MG SL tablet Place 1 tablet (0.4 mg total) under the tongue every 5 (five) minutes as needed for Chest pain (Repeat in 5 min if CP persists. Go to ER if CP worsens). 30 tablet 2    rosuvastatin (CRESTOR) 10 MG tablet Take 10 mg by mouth once daily.      simvastatin (ZOCOR) 20 MG tablet Take 20 mg by mouth every evening.      ubrogepant (UBRELVY) 100 mg tablet Take 1 tablet (100 mg total) by mouth as needed for Migraine. Okay to take 2nd tablet (another 100mg) by mouth in 2 hrs, no more than 2 in 24 hrs. 16 tablet 3    vitamin D (VITAMIN D3) 1000 units Tab Take 1,000 Units by mouth 2 (two) times a day.      baclofen (LIORESAL) 10 MG tablet Take 1 tablet (10 mg total) by mouth 3 (three) times daily. 90 tablet 11    diclofenac sodium (VOLTAREN) 1 % Gel Apply 2 g topically 4 (four) times daily. 100 g 5    FLUoxetine 20 MG capsule Take 1 capsule (20 mg total) by mouth once daily. 90 capsule 3     No current facility-administered medications on file prior to visit.          PHYSICAL EXAM   height is 5' 4" (1.626 m) and weight is 72.6 kg (160 lb 0.9 oz).   Body mass index is 27.47 kg/m².      All " other systems deferred.  GENERAL:  No acute distress  HABITUS: Normal  GAIT: Non-antalgic  SKIN: Normal  and No erythema, warmth, fluctuance     KNEE EXAM:    left:   Effusion: Minimal joint effusion  TTP: No over Medial/Lateral joint line, MCL, LCL, IT band, Pes bursa. Posterior knee   No crepitus with passive knee ROM  Passive ROM: Extension 0, Flexion 130 (pain with terminal extension)  No pain with manipulation of patella  Stable to varus/valgus stress. No increased laxity to anterior/posterior drawer testing  negative Cesilia's test  No pain with IR/ER rotation of the hip  5/5 strength in knee flexion and extension, ankle plantarflexion and dorsiflexion  Neurovascular Status: Sensation intact to light touch in Sural, Saphenous, SPN, DPN, Tibial nerve distribution  2+ pulse DP/PT, normal capillary refill, foot has normal warmth    DATA:  Diagnostic tests reviewed for today's visit:     4v of the knee radiographs appears normal for age. There is good alignment with no evidence of fracture, bony abnormalities or signficant degenerative changes.

## 2024-12-28 ENCOUNTER — HOSPITAL ENCOUNTER (OUTPATIENT)
Dept: RADIOLOGY | Facility: HOSPITAL | Age: 39
Discharge: HOME | End: 2024-12-28
Attending: ORTHOPAEDIC SURGERY
Payer: MEDICARE

## 2024-12-28 DIAGNOSIS — M23.92 INTERNAL DERANGEMENT OF LEFT KNEE: ICD-10-CM

## 2024-12-28 PROCEDURE — 73721 MRI JNT OF LWR EXTRE W/O DYE: CPT | Mod: TC,LT

## 2024-12-28 PROCEDURE — 73721 MRI JNT OF LWR EXTRE W/O DYE: CPT | Mod: 26,LT,, | Performed by: RADIOLOGY

## 2025-01-02 ENCOUNTER — PATIENT MESSAGE (OUTPATIENT)
Dept: ORTHOPEDICS | Facility: CLINIC | Age: 40
End: 2025-01-02
Payer: MEDICARE

## 2025-01-10 ENCOUNTER — OFFICE VISIT (OUTPATIENT)
Dept: ORTHOPEDICS | Facility: CLINIC | Age: 40
End: 2025-01-10
Payer: MEDICARE

## 2025-01-10 DIAGNOSIS — M23.92 INTERNAL DERANGEMENT OF LEFT KNEE: Primary | ICD-10-CM

## 2025-01-10 PROCEDURE — 99999 PR PBB SHADOW E&M-EST. PATIENT-LVL III: CPT | Mod: PBBFAC,,, | Performed by: ORTHOPAEDIC SURGERY

## 2025-01-10 RX ORDER — HYDROCODONE BITARTRATE AND ACETAMINOPHEN 5; 325 MG/1; MG/1
1 TABLET ORAL EVERY 8 HOURS PRN
Qty: 30 TABLET | Refills: 0 | Status: SHIPPED | OUTPATIENT
Start: 2025-01-10

## 2025-01-10 NOTE — PROGRESS NOTES
EST PATIENT ORTHOPAEDIC: Knee    PRIMARY CARE PHYSICIAN: Liyah Spring DO   REFERRING PROVIDER: No referring provider defined for this encounter.     ASSESSMENT & PLAN:    Impression:  Left Knee Internal Derangement  Possible Lumbar Radiculopathy    Follow Up Plan: PRN    Non operative care:    Galindo Grant has physical exam evidence of above and wishes to pursue an non-operative care. I am recommending the following: referral to sports    To review:  Patient comes in with a multiyear history of having left knee pain.  [facility]  He underwent arthroscopic intervention for meniscal repair versus meniscectomy about a year ago.  Did not do any physical therapy thereafter.  Did not have injection prior to surgical intervention.  He reports the previous MRI obtained prior to that surgery demonstrated a meniscus tear otherwise he is unsure if there was any other findings.  After the surgery was told that they repaired his meniscus and he had some early arthritis of his knee.  Since that surgery he reports his symptoms and pain have not improved and in some ways they become worse.  He is not taking any oral medications for this pain.  His pain is worse at night and with lying down.  Does report some low back pain without miroslava radiculopathy.  He is using kinesiology tape for his knee.  He reports overall no significant feelings of instability.  Does report ongoing mechanical symptoms in his knee.  Clinically his exam is unremarkable.  He does have some posterior knee pain and pain with terminal extension. Previous injection did not help significantly. So I repeated an MRI, which was unremarkable. No identifiable cause or explanation for his pain.  As a result I think next step would be seeking a 2nd opinion with sports they could help identify any possible lumbar based etiology of his pain or if appropriate could be a candidate for IOVERA.    The patient has been  ordered:  Pain prescription     CONSULTS:   None    ACTIVE PROBLEM LIST  Patient Active Problem List   Diagnosis    Neurofibromatosis, type 1 (von Recklinghausen's disease)    Other diseases of respiratory system, not elsewhere classified    Other symptoms involving respiratory system and chest    Systemic inflammatory response syndrome due to non-infectious process without acute organ dysfunction    Low back pain    Dysphagia, oropharyngeal    Chest pain, atypical    CHEEK (dyspnea on exertion)    Incomplete emptying of bladder    Complex sleep apnea syndrome    Pulmonary nodule    Insomnia    Erythrocytosis    Seizure    Other chronic pain           SUBJECTIVE    CHIEF COMPLAINT: Knee Pain    HPI:   Galindo Grant is a 39 y.o. male here for evaluation and management of left knee pain. There is not a specific incident that brought about this pain. he has had progressive problems with the knee(s) starting 1 years ago but is now progressing to interfere with activities which include: enjoying hobbies    Currently the pain in the joint is rated at mild with activity. The pain is intermittent and is located in the knee, located posterior and medial. The pain is described as aching and throbbing. Relieving factors include rest and repositioning.     There is associated Clicking and Popping.     Galindo Grant has no additional complaints.     12/19/24:  Patient comes in today with ongoing left knee pain.  Previous injection done did not help significantly with his pain.  Continues to be an nighttime pain worse with lying down.  Again previous arthroscopic surgery did not provide significant relief to his knee.  He has ongoing mechanical symptoms.    1/10/25:  Patient here today with MRI results.  He tried Medrol Dosepak which did not provide any improvement in his pain.    PROGRESSIVE SYMPTOMS:  Pain impacting sleep  Pain worsened by weight bearing    FUNCTIONAL STATUS:   Participate in recreational activities      PREVIOUS TREATMENTS:  Medical: None  Physical Therapy: Activities Modified   Previous Orthopaedic Surgery: Right knee arthroscopsy 2023    REVIEW OF SYSTEMS:  PAIN ASSESSMENT:  See HPI.  MUSCULOSKELETAL: See HPI.  OTHER 10 point review of systems is negative except as stated in HPI above    PAST MEDICAL HISTORY   has a past medical history of Arthritis, Disorder of thyroid, unspecified, Dysphagia, oropharyngeal (04/01/2016), Headache(784.0), Irregular heart beat, Mass of larynx (10/02/2012), Neurofibromatosis syndrome, Neurofibromatosis, type 1 (von Recklinghausen's disease) (birth), Neurofibromatosis, type 1 (von Recklinghausen's disease) (07/09/2012), and Seizure (11/16/2022).     PAST SURGICAL HISTORY   has a past surgical history that includes partial supraglottic laryngectomy (6/15/2012); Arm Debridement; Replacement of sacral nerve stimulator (N/A, 12/10/2021); and Removal of sacral neurostimulator device (N/A, 3/30/2022).     FAMILY HISTORY  family history includes Cancer in his cousin, father, and paternal uncle.     SOCIAL HISTORY   reports that he has quit smoking. He has quit using smokeless tobacco. He reports that he does not drink alcohol and does not use drugs.     ALLERGIES   Review of patient's allergies indicates:  No Known Allergies     MEDICATIONS  Current Outpatient Medications on File Prior to Visit   Medication Sig Dispense Refill    acetaminophen (TYLENOL) 500 MG tablet Take 1 tablet (500 mg total) by mouth every 6 (six) hours as needed for Pain. 30 tablet 0    albuterol (PROVENTIL/VENTOLIN HFA) 90 mcg/actuation inhaler Inhale into the lungs.      atorvastatin (LIPITOR) 10 MG tablet Take 1 tablet (10 mg total) by mouth once daily. 90 tablet 1    bacitracin 500 unit/gram Oint Apply topically 2 (two) times daily. 30 g 0    calcium-vitamin D3 (CALCIUM 500 + D) 500 mg(1,250mg) -200 unit per tablet Take 1 tablet by mouth 2 (two) times daily with meals. 180 tablet 3    cyanocobalamin (VITAMIN  B-12) 1000 MCG tablet Take 100 mcg by mouth 2 (two) times a day.      cyclobenzaprine (FLEXERIL) 5 MG tablet Take 1 tablet (5 mg total) by mouth nightly as needed for Muscle spasms. 90 tablet 1    HYDROcodone-acetaminophen (NORCO) 7.5-325 mg per tablet Take 1 tablet by mouth every 6 (six) hours as needed for Pain. 28 tablet 0    hydrOXYzine HCl (ATARAX) 25 MG tablet Take 1 tablet (25 mg total) by mouth 3 (three) times daily. DO NOT DRIVE AFTER TAKING MED 90 tablet 5    ibuprofen (ADVIL,MOTRIN) 800 MG tablet Take 1 tablet (800 mg total) by mouth every 6 (six) hours as needed for Pain. 20 tablet 0    levETIRAcetam (KEPPRA) 500 MG Tab Take 1 tablet (500 mg total) by mouth 2 (two) times daily. 180 tablet 1    levothyroxine sodium (LEVOTHYROXINE ORAL) Take by mouth.      meloxicam (MOBIC) 15 MG tablet Take 1 tablet (15 mg total) by mouth once daily. 30 tablet 0    methylPREDNISolone (MEDROL DOSEPACK) 4 mg tablet use as directed 1 each 0    metoprolol tartrate (LOPRESSOR) 25 MG tablet Take 0.5 tablets (12.5 mg total) by mouth 2 (two) times daily. 90 tablet 1    mupirocin (BACTROBAN) 2 % ointment Apply topically 3 (three) times daily. 30 g 0    nitroGLYCERIN (NITROSTAT) 0.4 MG SL tablet Place 1 tablet (0.4 mg total) under the tongue every 5 (five) minutes as needed for Chest pain (Repeat in 5 min if CP persists. Go to ER if CP worsens). 30 tablet 2    rosuvastatin (CRESTOR) 10 MG tablet Take 10 mg by mouth once daily.      simvastatin (ZOCOR) 20 MG tablet Take 20 mg by mouth every evening.      ubrogepant (UBRELVY) 100 mg tablet Take 1 tablet (100 mg total) by mouth as needed for Migraine. Okay to take 2nd tablet (another 100mg) by mouth in 2 hrs, no more than 2 in 24 hrs. 16 tablet 3    vitamin D (VITAMIN D3) 1000 units Tab Take 1,000 Units by mouth 2 (two) times a day.      baclofen (LIORESAL) 10 MG tablet Take 1 tablet (10 mg total) by mouth 3 (three) times daily. 90 tablet 11    diclofenac sodium (VOLTAREN) 1 % Gel  Apply 2 g topically 4 (four) times daily. 100 g 5    FLUoxetine 20 MG capsule Take 1 capsule (20 mg total) by mouth once daily. 90 capsule 3     No current facility-administered medications on file prior to visit.          PHYSICAL EXAM   vitals were not taken for this visit.   There is no height or weight on file to calculate BMI.      All other systems deferred.  GENERAL:  No acute distress  HABITUS: Normal  GAIT: Non-antalgic  SKIN: Normal  and No erythema, warmth, fluctuance     KNEE EXAM:    left:   Effusion: Minimal joint effusion  TTP: No over Medial/Lateral joint line, MCL, LCL, IT band, Pes bursa. Posterior knee   No crepitus with passive knee ROM  Passive ROM: Extension 0, Flexion 130 (pain with terminal extension)  No pain with manipulation of patella  Stable to varus/valgus stress. No increased laxity to anterior/posterior drawer testing  negative Cesilia's test  No pain with IR/ER rotation of the hip  5/5 strength in knee flexion and extension, ankle plantarflexion and dorsiflexion  Neurovascular Status: Sensation intact to light touch in Sural, Saphenous, SPN, DPN, Tibial nerve distribution  2+ pulse DP/PT, normal capillary refill, foot has normal warmth    DATA:  Diagnostic tests reviewed for today's visit:     4v of the knee radiographs appears normal for age. There is good alignment with no evidence of fracture, bony abnormalities or signficant degenerative changes.    MRI of left knee report and images reviewed with     *Patellofemoral: Intact without partial or full-thickness defects.  No subchondral edema.  *Medial tibiofemoral: Intact without partial or full-thickness defects.  No subchondral edema.  *Lateral tibiofemoral: Intact without partial or full-thickness defects.  No subchondral edema.  Bones: No acute fractures.  No avascular necrosis.  No marrow infiltrative process.     Miscellaneous: Hoffa's fat pat arthrofibrosis.  Small volume joint fluid.  No popliteal cyst.  Medial  gastrocnemius, lateral gastrocnemius, distal semimembranosus and visualized pes anserine tendons are intact.  Distal iliotibial band is normal.  Visualized neurovascular structures appear within normal limits.     Reported meniscal postsurgical change.  No meniscal retear.

## 2025-01-28 ENCOUNTER — PATIENT MESSAGE (OUTPATIENT)
Dept: SPORTS MEDICINE | Facility: CLINIC | Age: 40
End: 2025-01-28
Payer: MEDICARE

## 2025-01-30 ENCOUNTER — HOSPITAL ENCOUNTER (OUTPATIENT)
Dept: RADIOLOGY | Facility: HOSPITAL | Age: 40
Discharge: HOME | End: 2025-01-30
Attending: STUDENT IN AN ORGANIZED HEALTH CARE EDUCATION/TRAINING PROGRAM
Payer: MEDICARE

## 2025-01-30 ENCOUNTER — OFFICE VISIT (OUTPATIENT)
Dept: SPORTS MEDICINE | Facility: CLINIC | Age: 40
End: 2025-01-30
Payer: MEDICARE

## 2025-01-30 VITALS
DIASTOLIC BLOOD PRESSURE: 81 MMHG | BODY MASS INDEX: 27.03 KG/M2 | HEIGHT: 64 IN | SYSTOLIC BLOOD PRESSURE: 114 MMHG | WEIGHT: 158.31 LBS | HEART RATE: 81 BPM

## 2025-01-30 DIAGNOSIS — M25.562 CHRONIC PAIN OF LEFT KNEE: Primary | ICD-10-CM

## 2025-01-30 DIAGNOSIS — G89.29 CHRONIC PAIN OF LEFT KNEE: Primary | ICD-10-CM

## 2025-01-30 DIAGNOSIS — Z98.890 HISTORY OF ARTHROSCOPY OF RIGHT KNEE: ICD-10-CM

## 2025-01-30 DIAGNOSIS — M25.562 LEFT KNEE PAIN, UNSPECIFIED CHRONICITY: ICD-10-CM

## 2025-01-30 PROCEDURE — 3079F DIAST BP 80-89 MM HG: CPT | Mod: CPTII,S$GLB,, | Performed by: STUDENT IN AN ORGANIZED HEALTH CARE EDUCATION/TRAINING PROGRAM

## 2025-01-30 PROCEDURE — 1160F RVW MEDS BY RX/DR IN RCRD: CPT | Mod: CPTII,S$GLB,, | Performed by: STUDENT IN AN ORGANIZED HEALTH CARE EDUCATION/TRAINING PROGRAM

## 2025-01-30 PROCEDURE — 99999 PR PBB SHADOW E&M-EST. PATIENT-LVL III: CPT | Mod: PBBFAC,,, | Performed by: STUDENT IN AN ORGANIZED HEALTH CARE EDUCATION/TRAINING PROGRAM

## 2025-01-30 PROCEDURE — 99204 OFFICE O/P NEW MOD 45 MIN: CPT | Mod: 25,S$GLB,, | Performed by: STUDENT IN AN ORGANIZED HEALTH CARE EDUCATION/TRAINING PROGRAM

## 2025-01-30 PROCEDURE — 72114 X-RAY EXAM L-S SPINE BENDING: CPT | Mod: TC

## 2025-01-30 PROCEDURE — 20611 DRAIN/INJ JOINT/BURSA W/US: CPT | Mod: LT,S$GLB,, | Performed by: STUDENT IN AN ORGANIZED HEALTH CARE EDUCATION/TRAINING PROGRAM

## 2025-01-30 PROCEDURE — 3074F SYST BP LT 130 MM HG: CPT | Mod: CPTII,S$GLB,, | Performed by: STUDENT IN AN ORGANIZED HEALTH CARE EDUCATION/TRAINING PROGRAM

## 2025-01-30 PROCEDURE — 1159F MED LIST DOCD IN RCRD: CPT | Mod: CPTII,S$GLB,, | Performed by: STUDENT IN AN ORGANIZED HEALTH CARE EDUCATION/TRAINING PROGRAM

## 2025-01-30 PROCEDURE — 3008F BODY MASS INDEX DOCD: CPT | Mod: CPTII,S$GLB,, | Performed by: STUDENT IN AN ORGANIZED HEALTH CARE EDUCATION/TRAINING PROGRAM

## 2025-01-30 PROCEDURE — 72114 X-RAY EXAM L-S SPINE BENDING: CPT | Mod: 26,,, | Performed by: RADIOLOGY

## 2025-01-30 RX ORDER — KETOROLAC TROMETHAMINE 30 MG/ML
30 INJECTION, SOLUTION INTRAMUSCULAR; INTRAVENOUS
Status: DISCONTINUED | OUTPATIENT
Start: 2025-01-30 | End: 2025-01-30 | Stop reason: HOSPADM

## 2025-01-30 RX ADMIN — KETOROLAC TROMETHAMINE 30 MG: 30 INJECTION, SOLUTION INTRAMUSCULAR; INTRAVENOUS at 03:01

## 2025-01-30 NOTE — PATIENT INSTRUCTIONS
You had a Ketorolac (NSAID) injection today. There are two medicines in this injection,a numbing medicine (local anesthetic) and an NSAID.     There are risks with this procedure.   Any time the skin is punctured with a needle, there is a small risk of infection. With these injections that risk is less than 1 and 14,000. 2-3% of people developed a dimple or pale spot at the injection site, which is strictly cosmetic.

## 2025-01-30 NOTE — PROGRESS NOTES
CC: left knee pain    39 y.o. Male presents today for evaluation of his left knee pain. Pt was referred by Dr. Sanchez Gomez. Pt reports prev hx of ligament tear in left knee in elementary school. Pt also notes prev hx arthroscopy in left knee about 1 yr ago. Pt reports gradual onset of left knee pain about 15 yrs ago. Pt localizes pain to medial, anterior, and lateral knee with intermittent pain down lower leg and radiating up thigh into left-sided low back. Pt reports pain is 2/10 today and 10/10 at worst. Pt reports popping in the knee (states this occurs when he moves his leg while laying down). Pt denies numbness/tingling, but notes intermittent burning sensation in anterolateral knee.     Attempted treatments: tylenol extra strength 1000mg PRN, advil/ibuprofen 400-800mg PRN, topicals  Pain score: 2/10 today, 10/10 worst  History of trauma/injury: none since last visit with Dr. Gomez  Affecting ADLs: yes      REVIEW OF SYSTEMS:   Constitution: Patient denies fever or chills.  Eyes: Patient denies eye pain or vision changes.  HEENT: Patient denies ear pain, sore throat, or nasal discharge.  CVS: Patient denies chest pain.  Lungs: Patient denies shortness of breath or cough.  Skin: Patient denies skin rash or itching.    Musculoskeletal: Patient denies recent falls. See HPI.  Psych: Patient denies any current anxiety or nervousness.    PAST MEDICAL HISTORY:   Past Medical History:   Diagnosis Date    Arthritis     in knee    Disorder of thyroid, unspecified     Dysphagia, oropharyngeal 04/01/2016    Headache(784.0)     Irregular heart beat     Mass of larynx 10/02/2012    Neurofibromatosis syndrome     Neurofibromatosis, type 1 (von Recklinghausen's disease) birth    Neurofibromatosis, type 1 (von Recklinghausen's disease) 07/09/2012    Seizure 11/16/2022       MEDICATIONS:     Current Outpatient Medications:     acetaminophen (TYLENOL) 500 MG tablet, Take 1 tablet (500 mg total) by mouth every 6 (six) hours as  needed for Pain., Disp: 30 tablet, Rfl: 0    albuterol (PROVENTIL/VENTOLIN HFA) 90 mcg/actuation inhaler, Inhale into the lungs., Disp: , Rfl:     atorvastatin (LIPITOR) 10 MG tablet, Take 1 tablet (10 mg total) by mouth once daily., Disp: 90 tablet, Rfl: 1    bacitracin 500 unit/gram Oint, Apply topically 2 (two) times daily., Disp: 30 g, Rfl: 0    calcium-vitamin D3 (CALCIUM 500 + D) 500 mg(1,250mg) -200 unit per tablet, Take 1 tablet by mouth 2 (two) times daily with meals., Disp: 180 tablet, Rfl: 3    cyanocobalamin (VITAMIN B-12) 1000 MCG tablet, Take 100 mcg by mouth 2 (two) times a day., Disp: , Rfl:     cyclobenzaprine (FLEXERIL) 5 MG tablet, Take 1 tablet (5 mg total) by mouth nightly as needed for Muscle spasms., Disp: 90 tablet, Rfl: 1    HYDROcodone-acetaminophen (NORCO) 5-325 mg per tablet, Take 1 tablet by mouth every 8 (eight) hours as needed for Pain., Disp: 30 tablet, Rfl: 0    hydrOXYzine HCl (ATARAX) 25 MG tablet, Take 1 tablet (25 mg total) by mouth 3 (three) times daily. DO NOT DRIVE AFTER TAKING MED, Disp: 90 tablet, Rfl: 5    ibuprofen (ADVIL,MOTRIN) 800 MG tablet, Take 1 tablet (800 mg total) by mouth every 6 (six) hours as needed for Pain., Disp: 20 tablet, Rfl: 0    levETIRAcetam (KEPPRA) 500 MG Tab, Take 1 tablet (500 mg total) by mouth 2 (two) times daily., Disp: 180 tablet, Rfl: 1    levothyroxine sodium (LEVOTHYROXINE ORAL), Take by mouth., Disp: , Rfl:     meloxicam (MOBIC) 15 MG tablet, Take 1 tablet (15 mg total) by mouth once daily., Disp: 30 tablet, Rfl: 0    methylPREDNISolone (MEDROL DOSEPACK) 4 mg tablet, use as directed, Disp: 1 each, Rfl: 0    metoprolol tartrate (LOPRESSOR) 25 MG tablet, Take 0.5 tablets (12.5 mg total) by mouth 2 (two) times daily., Disp: 90 tablet, Rfl: 1    mupirocin (BACTROBAN) 2 % ointment, Apply topically 3 (three) times daily., Disp: 30 g, Rfl: 0    nitroGLYCERIN (NITROSTAT) 0.4 MG SL tablet, Place 1 tablet (0.4 mg total) under the tongue every 5  "(five) minutes as needed for Chest pain (Repeat in 5 min if CP persists. Go to ER if CP worsens)., Disp: 30 tablet, Rfl: 2    rosuvastatin (CRESTOR) 10 MG tablet, Take 10 mg by mouth once daily., Disp: , Rfl:     simvastatin (ZOCOR) 20 MG tablet, Take 20 mg by mouth every evening., Disp: , Rfl:     ubrogepant (UBRELVY) 100 mg tablet, Take 1 tablet (100 mg total) by mouth as needed for Migraine. Okay to take 2nd tablet (another 100mg) by mouth in 2 hrs, no more than 2 in 24 hrs., Disp: 16 tablet, Rfl: 3    vitamin D (VITAMIN D3) 1000 units Tab, Take 1,000 Units by mouth 2 (two) times a day., Disp: , Rfl:     baclofen (LIORESAL) 10 MG tablet, Take 1 tablet (10 mg total) by mouth 3 (three) times daily., Disp: 90 tablet, Rfl: 11    diclofenac sodium (VOLTAREN) 1 % Gel, Apply 2 g topically 4 (four) times daily., Disp: 100 g, Rfl: 5    FLUoxetine 20 MG capsule, Take 1 capsule (20 mg total) by mouth once daily., Disp: 90 capsule, Rfl: 3    ALLERGIES:   Review of patient's allergies indicates:  No Known Allergies     PHYSICAL EXAMINATION:  /81   Pulse 81   Ht 5' 4" (1.626 m)   Wt 71.8 kg (158 lb 4.6 oz)   BMI 27.17 kg/m²   Vitals signs and nursing note have been reviewed.    General: In no acute distress, well developed, well nourished, no diaphoresis  Eyes: EOM full and smooth, no eye redness or discharge  HENT: normocephalic and atraumatic, neck supple, trachea midline, no nasal discharge  Cardiovascular: no LE edema  Lungs: respirations non-labored, no conversational dyspnea   Neuro: AAOx3, CN2-12 grossly intact  Skin: No rashes, warm and dry  Psychiatric: cooperative, pleasant, mood and affect appropriate for age    Left Knee:   Gait: wnl    Inspection/Palpation:   -Rubor   -Calor  -Effusion   -Patella ballotable   -Patellar apprehension  -Retinacular tenderness   -Patellar crepitus   Patellar tilt grossly normal     TTP at:  -Joint line   -MCL   -LCL   -Popliteal region   -Quad tendon   -Patella  -Pat " tendon  -Pat border  -Med condyle   -Lat condyle   -Pes   -Prox fibula   -Tib tub  -Gerdy's tubercle  -Distal Hamstring tendons  -Proximal Hamstrings/Ischial tuberosity  -ITB    ROM:   Ext: 0°   Flex:150°   Popliteal Angle: 40°   -Discomfort w/ full flex   -Bounce-home discomfort     Ligamentous:   -Ant drawer   -Post drawer   -Lachman's   Good endpoints & no pain w/ valgus & varus stress    Meniscal:  -Cesilia's   -Ken   -Pain w/ squat     Other:  -Patellar apprehension  -Patellar grind  -Russo's   -J sign  -Roselyn's  Abductors     IMAGIN. MRI ordered due to left knee pain, taken on 24.  2. MRI images were reviewed personally by me and then directly with patient.  3. FINDINGS: Menisci: Reported meniscal postsurgical change.  Medial and lateral menisci demonstrate normal morphology and signal intensity.  Intact anterior and posterior root ligaments.     Ligaments: ACL, PCL, MCL and posterior-lateral corner structures are normal.     Extensor Mechanism: Patellofemoral alignment is maintained.  Quadriceps and patellar tendons are intact.  MPFL and medial/lateral retinacula are normal.     Cartilage:     *Patellofemoral: Intact without partial or full-thickness defects.  No subchondral edema.  *Medial tibiofemoral: Intact without partial or full-thickness defects.  No subchondral edema.  *Lateral tibiofemoral: Intact without partial or full-thickness defects.  No subchondral edema.  Bones: No acute fractures.  No avascular necrosis.  No marrow infiltrative process.     Miscellaneous: Hoffa's fat pat arthrofibrosis.  Small volume joint fluid.  No popliteal cyst.  Medial gastrocnemius, lateral gastrocnemius, distal semimembranosus and visualized pes anserine tendons are intact.  Distal iliotibial band is normal.  Visualized neurovascular structures appear within normal limits.    4. IMPRESSION: 1. Reported meniscal postsurgical change.  No meniscal retear.  2. Hoffa's fat pat  arthrofibrosis.    IMAGIN. Lumbar X-ray ordered due to left leg pain. 5 views taken today.   2. X-ray images were reviewed personally by me and then directly with patient.  3. FINDINGS:  Normal bony alignment, no signs of acute fracture or dislocation, disc height maintained, soft tissues unremarkable.    4. IMPRESSION:   No acute osseous abnormalities appreciated.      ASSESSMENT:      ICD-10-CM ICD-9-CM   1. Left knee pain, unspecified chronicity  M25.562 719.46   2. Internal derangement of left knee  M23.92 717.9   3. History of arthroscopy of right knee  Z98.890 V45.89         PLAN:  Based on patient history, physical exam findings, and imaging I believe patient's pain might just be coming from his knee.  Patient reports having relief with a corticosteroid injection given last summer, but not lasting relief.  He does have history of an arthroscopy without rehab following surgery.  The MRI does not show anything definitive to be contributing to his pain, given that he has had good response to intra-articular medication, we will move forward with left knee intra-articular ketorolac injection.  Follow up in 2 weeks.  Pending improvement, may move forward with hyaluronic acid injection (Synvisc-One).    Risks and benefits were discussed with patient prior to receiving injection.  Depending on injection type, risks include the possibility of infection, pain, disruptions in blood pressure and blood sugar, and cosmetic deformity at site of injection.    All questions were answered to the best of my ability and all concerns were addressed at this time.    Follow up in-person in 2 weeks, or sooner if need be.    This note is dictated using the M*Modal Fluency Direct word recognition program. There are word recognition mistakes that are occasionally missed on review.

## 2025-01-30 NOTE — PROCEDURES
Large Joint Aspiration/Injection: L knee    Date/Time: 1/30/2025 3:30 PM    Performed by: Kamala Ocampo MD  Authorized by: Kamala Ocampo MD    Consent Done?:  Yes (Verbal)  Indications:  Arthritis and pain  Site marked: the procedure site was marked    Timeout: prior to procedure the correct patient, procedure, and site was verified      Local anesthesia used?: Yes    Anesthesia:  Local infiltration  Local anesthetic:  Co-phenylcaine spray    Details:  Needle Size:  22 G  Ultrasonic Guidance for needle placement?: Yes (Ultrasound guidance used to avoid neurovascular injury and/or to improve accuracy given body habitus.)    Images are saved and documented.  Approach: Superolateral.  Location:  Knee  Site:  L knee  Medications:  30 mg ketorolac 30 mg/mL (1 mL)  Medications comment:  Ropivacaine 0.2% 2mL  Patient tolerance:  Patient tolerated the procedure well with no immediate complications     TECHNIQUE: Real time ultrasound examination of the left knee was performed with SonoSite Edge 2, 9-L MHz linear probe(s). Ultrasound guidance was used for needle localization. Images were saved and stored for documentation. Dynamic visualization of the needle was continuous throughout the procedures and maintained in good position.

## 2025-02-13 ENCOUNTER — OFFICE VISIT (OUTPATIENT)
Dept: SPORTS MEDICINE | Facility: CLINIC | Age: 40
End: 2025-02-13
Payer: MEDICARE

## 2025-02-13 VITALS
WEIGHT: 159.06 LBS | SYSTOLIC BLOOD PRESSURE: 111 MMHG | HEART RATE: 74 BPM | BODY MASS INDEX: 27.16 KG/M2 | HEIGHT: 64 IN | DIASTOLIC BLOOD PRESSURE: 76 MMHG

## 2025-02-13 DIAGNOSIS — G89.29 CHRONIC PAIN OF LEFT KNEE: Primary | ICD-10-CM

## 2025-02-13 DIAGNOSIS — M17.12 PRIMARY OSTEOARTHRITIS OF LEFT KNEE: ICD-10-CM

## 2025-02-13 DIAGNOSIS — M25.562 CHRONIC PAIN OF LEFT KNEE: Primary | ICD-10-CM

## 2025-02-13 PROCEDURE — 99999 PR PBB SHADOW E&M-EST. PATIENT-LVL IV: CPT | Mod: PBBFAC,,, | Performed by: STUDENT IN AN ORGANIZED HEALTH CARE EDUCATION/TRAINING PROGRAM

## 2025-02-13 PROCEDURE — 3008F BODY MASS INDEX DOCD: CPT | Mod: CPTII,S$GLB,, | Performed by: STUDENT IN AN ORGANIZED HEALTH CARE EDUCATION/TRAINING PROGRAM

## 2025-02-13 PROCEDURE — 3074F SYST BP LT 130 MM HG: CPT | Mod: CPTII,S$GLB,, | Performed by: STUDENT IN AN ORGANIZED HEALTH CARE EDUCATION/TRAINING PROGRAM

## 2025-02-13 PROCEDURE — 1160F RVW MEDS BY RX/DR IN RCRD: CPT | Mod: CPTII,S$GLB,, | Performed by: STUDENT IN AN ORGANIZED HEALTH CARE EDUCATION/TRAINING PROGRAM

## 2025-02-13 PROCEDURE — 1159F MED LIST DOCD IN RCRD: CPT | Mod: CPTII,S$GLB,, | Performed by: STUDENT IN AN ORGANIZED HEALTH CARE EDUCATION/TRAINING PROGRAM

## 2025-02-13 PROCEDURE — 99214 OFFICE O/P EST MOD 30 MIN: CPT | Mod: S$GLB,,, | Performed by: STUDENT IN AN ORGANIZED HEALTH CARE EDUCATION/TRAINING PROGRAM

## 2025-02-13 PROCEDURE — 3078F DIAST BP <80 MM HG: CPT | Mod: CPTII,S$GLB,, | Performed by: STUDENT IN AN ORGANIZED HEALTH CARE EDUCATION/TRAINING PROGRAM

## 2025-02-13 PROCEDURE — 1126F AMNT PAIN NOTED NONE PRSNT: CPT | Mod: CPTII,S$GLB,, | Performed by: STUDENT IN AN ORGANIZED HEALTH CARE EDUCATION/TRAINING PROGRAM

## 2025-02-13 NOTE — PROGRESS NOTES
CC: left knee pain    39 y.o. Male presents today for follow up evaluation of his left knee pain following toradol injection. Pt reports pain has resolved, and reports pain is 0/10 today. Pt reports popping in the knee with activity that is not painful. Pt denies numbness/tingling.     Attempted treatments: toradol injection  Pain score: 0/10  History of trauma/injury: none since last visit  Affecting ADLs: no      REVIEW OF SYSTEMS:   Constitution: Patient denies fever or chills.  Eyes: Patient denies eye pain or vision changes.  HEENT: Patient denies ear pain, sore throat, or nasal discharge.  CVS: Patient denies chest pain.  Lungs: Patient denies shortness of breath or cough.  Skin: Patient denies skin rash or itching.    Musculoskeletal: Patient denies recent falls. See HPI.  Psych: Patient denies any current anxiety or nervousness.    PAST MEDICAL HISTORY:   Past Medical History:   Diagnosis Date    Arthritis     in knee    Disorder of thyroid, unspecified     Dysphagia, oropharyngeal 04/01/2016    Headache(784.0)     Irregular heart beat     Mass of larynx 10/02/2012    Neurofibromatosis syndrome     Neurofibromatosis, type 1 (von Recklinghausen's disease) birth    Neurofibromatosis, type 1 (von Recklinghausen's disease) 07/09/2012    Seizure 11/16/2022       MEDICATIONS:     Current Outpatient Medications:     acetaminophen (TYLENOL) 500 MG tablet, Take 1 tablet (500 mg total) by mouth every 6 (six) hours as needed for Pain., Disp: 30 tablet, Rfl: 0    albuterol (PROVENTIL/VENTOLIN HFA) 90 mcg/actuation inhaler, Inhale into the lungs., Disp: , Rfl:     atorvastatin (LIPITOR) 10 MG tablet, Take 1 tablet (10 mg total) by mouth once daily., Disp: 90 tablet, Rfl: 1    bacitracin 500 unit/gram Oint, Apply topically 2 (two) times daily., Disp: 30 g, Rfl: 0    calcium-vitamin D3 (CALCIUM 500 + D) 500 mg(1,250mg) -200 unit per tablet, Take 1 tablet by mouth 2 (two) times daily with meals., Disp: 180 tablet, Rfl: 3     cyanocobalamin (VITAMIN B-12) 1000 MCG tablet, Take 100 mcg by mouth 2 (two) times a day., Disp: , Rfl:     cyclobenzaprine (FLEXERIL) 5 MG tablet, Take 1 tablet (5 mg total) by mouth nightly as needed for Muscle spasms., Disp: 90 tablet, Rfl: 1    HYDROcodone-acetaminophen (NORCO) 5-325 mg per tablet, Take 1 tablet by mouth every 8 (eight) hours as needed for Pain., Disp: 30 tablet, Rfl: 0    hydrOXYzine HCl (ATARAX) 25 MG tablet, Take 1 tablet (25 mg total) by mouth 3 (three) times daily. DO NOT DRIVE AFTER TAKING MED, Disp: 90 tablet, Rfl: 5    ibuprofen (ADVIL,MOTRIN) 800 MG tablet, Take 1 tablet (800 mg total) by mouth every 6 (six) hours as needed for Pain., Disp: 20 tablet, Rfl: 0    levETIRAcetam (KEPPRA) 500 MG Tab, Take 1 tablet (500 mg total) by mouth 2 (two) times daily., Disp: 180 tablet, Rfl: 1    levothyroxine sodium (LEVOTHYROXINE ORAL), Take by mouth., Disp: , Rfl:     meloxicam (MOBIC) 15 MG tablet, Take 1 tablet (15 mg total) by mouth once daily., Disp: 30 tablet, Rfl: 0    methylPREDNISolone (MEDROL DOSEPACK) 4 mg tablet, use as directed, Disp: 1 each, Rfl: 0    metoprolol tartrate (LOPRESSOR) 25 MG tablet, Take 0.5 tablets (12.5 mg total) by mouth 2 (two) times daily., Disp: 90 tablet, Rfl: 1    mupirocin (BACTROBAN) 2 % ointment, Apply topically 3 (three) times daily., Disp: 30 g, Rfl: 0    nitroGLYCERIN (NITROSTAT) 0.4 MG SL tablet, Place 1 tablet (0.4 mg total) under the tongue every 5 (five) minutes as needed for Chest pain (Repeat in 5 min if CP persists. Go to ER if CP worsens)., Disp: 30 tablet, Rfl: 2    rosuvastatin (CRESTOR) 10 MG tablet, Take 10 mg by mouth once daily., Disp: , Rfl:     simvastatin (ZOCOR) 20 MG tablet, Take 20 mg by mouth every evening., Disp: , Rfl:     ubrogepant (UBRELVY) 100 mg tablet, Take 1 tablet (100 mg total) by mouth as needed for Migraine. Okay to take 2nd tablet (another 100mg) by mouth in 2 hrs, no more than 2 in 24 hrs., Disp: 16 tablet, Rfl: 3     "vitamin D (VITAMIN D3) 1000 units Tab, Take 1,000 Units by mouth 2 (two) times a day., Disp: , Rfl:     baclofen (LIORESAL) 10 MG tablet, Take 1 tablet (10 mg total) by mouth 3 (three) times daily., Disp: 90 tablet, Rfl: 11    diclofenac sodium (VOLTAREN) 1 % Gel, Apply 2 g topically 4 (four) times daily., Disp: 100 g, Rfl: 5    FLUoxetine 20 MG capsule, Take 1 capsule (20 mg total) by mouth once daily., Disp: 90 capsule, Rfl: 3    ALLERGIES:   Review of patient's allergies indicates:  No Known Allergies     PHYSICAL EXAMINATION:  /76 (Patient Position: Sitting)   Pulse 74   Ht 5' 4" (1.626 m)   Wt 72.1 kg (159 lb 1 oz)   BMI 27.30 kg/m²   Vitals signs and nursing note have been reviewed.    General: In no acute distress, well developed, well nourished, no diaphoresis  Eyes: EOM full and smooth, no eye redness or discharge  HENT: normocephalic and atraumatic, neck supple, trachea midline, no nasal discharge  Cardiovascular: no LE edema  Lungs: respirations non-labored, no conversational dyspnea   Neuro: AAOx3, CN2-12 grossly intact  Skin: No rashes, warm and dry  Psychiatric: cooperative, pleasant, mood and affect appropriate for age    ASSESSMENT:      ICD-10-CM ICD-9-CM   1. Chronic pain of left knee  M25.562 719.46    G89.29 338.29         PLAN:  Patient with significant relief of symptoms following intra-articular Toradol injection to the left knee.  This let me know that his pain generator is likely intra-articular.  In keeping with previous plan, we will move forward with Synvisc-One injection to the left knee if and when pain returns.    All questions were answered to the best of my ability and all concerns were addressed at this time.    Follow up for above.     This note is dictated using the M*Modal Fluency Direct word recognition program. There are word recognition mistakes that are occasionally missed on review.        "

## 2025-03-14 DIAGNOSIS — G43.709 CHRONIC MIGRAINE WITHOUT AURA WITHOUT STATUS MIGRAINOSUS, NOT INTRACTABLE: ICD-10-CM

## 2025-03-14 RX ORDER — UBROGEPANT 100 MG/1
100 TABLET ORAL
Qty: 16 TABLET | Refills: 3 | OUTPATIENT
Start: 2025-03-14

## 2025-03-14 NOTE — TELEPHONE ENCOUNTER
The original prescription was reordered on 11/5/2024 by Tiny Oconnor MD. Renewing this prescription may not be appropriate.

## 2025-03-17 ENCOUNTER — TELEPHONE (OUTPATIENT)
Dept: NEUROLOGY | Facility: CLINIC | Age: 40
End: 2025-03-17
Payer: MEDICARE

## 2025-03-17 NOTE — TELEPHONE ENCOUNTER
----- Message from Danielle sent at 3/14/2025  1:23 PM CDT -----  Regarding: patient call back  Type: Patient Call BackWho called: Self What is the request in detail: asked for a call back to get some information as to why his medicine was discontinued Can the clinic reply by [facility]? No Would the patient rather a call back or a response via [facility]? Call Best call back number: .[phone]

## 2025-03-19 ENCOUNTER — PATIENT MESSAGE (OUTPATIENT)
Dept: HEMATOLOGY/ONCOLOGY | Facility: CLINIC | Age: 40
End: 2025-03-19
Payer: MEDICARE

## 2025-03-27 ENCOUNTER — HOSPITAL ENCOUNTER (EMERGENCY)
Facility: HOSPITAL | Age: 40
Discharge: HOME | End: 2025-03-28
Attending: EMERGENCY MEDICINE
Payer: MEDICARE

## 2025-03-27 DIAGNOSIS — S93.601A SPRAIN OF RIGHT FOOT, INITIAL ENCOUNTER: Primary | ICD-10-CM

## 2025-03-27 PROCEDURE — 63600175 PHARM REV CODE 636 W HCPCS: Mod: JZ,TB

## 2025-03-27 PROCEDURE — 99284 EMERGENCY DEPT VISIT MOD MDM: CPT | Mod: 25

## 2025-03-27 PROCEDURE — 25000003 PHARM REV CODE 250

## 2025-03-27 PROCEDURE — 96372 THER/PROPH/DIAG INJ SC/IM: CPT

## 2025-03-27 RX ORDER — NAPROXEN 500 MG/1
500 TABLET ORAL 2 TIMES DAILY
Qty: 30 TABLET | Refills: 0 | Status: SHIPPED | OUTPATIENT
Start: 2025-03-27

## 2025-03-27 RX ORDER — KETOROLAC TROMETHAMINE 30 MG/ML
30 INJECTION, SOLUTION INTRAMUSCULAR; INTRAVENOUS
Status: COMPLETED | OUTPATIENT
Start: 2025-03-27 | End: 2025-03-27

## 2025-03-27 RX ORDER — ACETAMINOPHEN 500 MG
1000 TABLET ORAL
Status: DISCONTINUED | OUTPATIENT
Start: 2025-03-27 | End: 2025-03-27

## 2025-03-27 RX ORDER — HYDROCODONE BITARTRATE AND ACETAMINOPHEN 5; 325 MG/1; MG/1
1 TABLET ORAL
Refills: 0 | Status: COMPLETED | OUTPATIENT
Start: 2025-03-27 | End: 2025-03-27

## 2025-03-27 RX ORDER — METHOCARBAMOL 500 MG/1
1000 TABLET, FILM COATED ORAL 3 TIMES DAILY
Qty: 30 TABLET | Refills: 0 | Status: SHIPPED | OUTPATIENT
Start: 2025-03-27 | End: 2025-04-01

## 2025-03-27 RX ORDER — ACETAMINOPHEN 500 MG
500 TABLET ORAL EVERY 6 HOURS PRN
Qty: 30 TABLET | Refills: 0 | Status: SHIPPED | OUTPATIENT
Start: 2025-03-27

## 2025-03-27 RX ADMIN — HYDROCODONE BITARTRATE AND ACETAMINOPHEN 1 TABLET: 5; 325 TABLET ORAL at 11:03

## 2025-03-27 RX ADMIN — KETOROLAC TROMETHAMINE 30 MG: 30 INJECTION, SOLUTION INTRAMUSCULAR; INTRAVENOUS at 11:03

## 2025-03-28 VITALS
HEART RATE: 75 BPM | RESPIRATION RATE: 18 BRPM | SYSTOLIC BLOOD PRESSURE: 126 MMHG | WEIGHT: 175 LBS | TEMPERATURE: 98 F | OXYGEN SATURATION: 96 % | HEIGHT: 64 IN | DIASTOLIC BLOOD PRESSURE: 68 MMHG | BODY MASS INDEX: 29.88 KG/M2

## 2025-03-28 NOTE — DISCHARGE INSTRUCTIONS
[facility]:  If you would like to follow up with the [facility] for further care of your injury, please call the [facility] Scheduling Department at [phone] during business hours. Please let the  know you need a follow-up appointment for your injury with Orthopedics, and you will be scheduled in the Orthopedic Clinic. Please bring your Discharge Papers with you to the clinic appointment.    Splint: If you were placed in a splint, please keep your splint on and dry until you are seen by Orthopedics and they tell you that you are OK to remove it. Rest and Elevate the extremity to help reduce swelling and pain.   Crutches: If you were discharged home with crutches, use the crutches and DO NOT put any weight on the leg until you are cleared by orthopedics and they tell you that you are OK to walk on it.     For muscular pain please apply a compressive ACE bandage. Rest and elevate the affected painful area.  Apply cold compresses intermittently as needed.  As pain recedes, begin normal activities slowly as tolerated.      If prescribed Lidocaine Patch - Please place over the area of most pain. You can apply it once every 24 hours. Please remove the patch 12 hours after you apply it.   If you are not able to get the prescription Lidoderm Patch, you can try one of the over the counter Lidocaine Patches.     If you have been prescribed Ibuprofen or Tylenol, these medications may be used for pain every 6-8 hours. Do not exceed a dose of 800 mg of Ibuprofen or a dose of 1000 mg of Tylenol in this 6-8 hour period. Please stop taking these medications if you experience: weakness, itching, yellow skin or eyes, joint pains, vomiting blood, blood or black stools, unusual weight gain, or swelling in your arms, legs, hands, or feet.     If you have been prescribed Naproxen for pain. This is an Non-Steroidal Anti-Inflammatory (NSAID)  Medication. Please do not take any additional NSAIDs while you are taking this medication including (Advil, Aleve, Motrin, Ibuprofen, Mobic\meloxicam, Naprosyn, Toradol, ketoralac, etc.). Please stop taking this medication if you experience: weakness, itching, yellow skin or eyes, joint pains, vomiting blood, blood or black stools, unusual weight gain, or swelling in your arms, legs, hands, or feet.     You have been prescribed Robaxin (Methocarbamol) for muscle spasms/pain. Please do not take this medication while working, drinking alcohol, swimming, or while driving/operating heavy machinery. This medication may cause drowsiness, dizziness, impair judgment, and reduce physical capabilities.You should not drive, operate heavy machinery, or make life changing decisions while taking this medication.    If you were prescribed a medication such as Norco or Percocet remember that this medication contains Tylenol. Please do not take any additional Tylenol while you are taking this medication.     While in the Emergency Department you received medication that may cause drowsiness, dizziness, impaired judgment, and reduced physical capabilities. You should not drive, operate heavy machinery, swim, or make life  changing decisions within 24 hours of receiving this medication.      Thank you for coming to our Emergency Department today. It is important to remember that some problems or medical conditions are difficult to diagnose and may not be found or addressed during your Emergency Department visit.  These conditions often start with non-specific symptoms and can only be diagnosed on follow up visits with your primary care physician or specialist when the symptoms continue or change. Please remember that all medical conditions can change, and we cannot predict how you will be feeling tomorrow or the next day. Return to the ER with any questions/concerns, new/concerning symptoms including fever, chest pain, shortness of  breath, loss of consciousness, dizziness, weakness, worsening symptoms, failure to improve, or any other concerns. Also, please follow up with your Primary Care Physician and/or Pediatrician in the next 1-2 days to review your ED visit in entirety and for re-evaluation.   Be sure to follow up with your primary care doctor and review all labs/imaging/tests that were performed during your ER visit with them. It is very common for us to identify non-emergent incidental findings which must be followed up with your primary care physician.  Some labs/imaging/tests may be outside of the normal range, and require non-emergent follow-up and/or further investigation/treatment/procedures/testing to help diagnose/exclude/prevent complications or other potentially serious medical conditions. Some abnormalities may not have been discussed or addressed during your ER visit. Some lab results may not return during your ER visit but can be accessible by downloading the free [facility] [email or web address] or by visiting [email or web address] . It is important for you to review all labs/imaging/tests which are outside of the normal range with your physician.  An ER visit does not replace a primary care visit, and many screening tests or follow-up tests cannot be ordered by an ER doctor or performed by the ER. Some tests may even require pre-approval.  If you do not have a primary care doctor, you may contact the one listed on your discharge paperwork or you may also call the [facility] at [phone] , or [facility] at  [phone] to schedule an appointment, or establish care with a primary care doctor or even a specialist and to obtain information about local resources. It is important to your health that you have a primary care doctor.  Please take all medications as directed. We have done our best to select a medication for you that will treat your condition however, all medications may potentially have  side-effects and it is impossible to predict which medications may give you side-effects or what those side-effects (if any) those medications may give you.  If you feel that you are having a negative effect or side-effect of any medication you should stop taking those medications immediately and seek medical attention. If you feel that you are having a life-threatening reaction call 911.  Do not drive, swim, climb to height, take a bath, operate heavy machinery, drink alcohol or take potentially sedating medications, sign any legal documents or make any important decisions for 24 hours if you have received any pain medications, sedatives or mood altering drugs during your ER visit or within 24 hours of taking them if they have been prescribed to you.   You can find additional resources for Dentists, hearing aids, durable medical equipment, low cost pharmacies and other resources at [email or web address]  Patient agrees with this plan. Discussed with her strict return precautions, they verbalized understanding. Patient is stable for discharge.   § Please take all medication as prescribed.

## 2025-03-28 NOTE — ED PROVIDER NOTES
Encounter Date: 3/27/2025       History     Chief Complaint   Patient presents with    Foot Injury     Pt was at karate last night and had trip and fall after running. Pt states he was able to get up and ambulate after fall, but woke up to significant 10/10 swelling and pain this AM. Pt has moderate swelling to l. Great toe compared to right toe. Pt unable to perform active ROM when asked to moved effected toe. Pt denies numbness/tingling to toe.      39-year-old male presenting to the emergency department for evaluation of right great toe pain.  Last night, he was running, took a bad step, and suffered a hyperflexion injury of the right great toe.  Since then has had some pain and bruising to the area.  Denies numbness, weakness, or paresthesia.  He has been ambulatory but it is painful to ambulate.  He denies any pain with touching the toe but reports pain with range of motion and ambulation.  No medications today to alleviate symptoms.    The history is provided by the patient. No  was used.     Review of patient's allergies indicates:  No Known Allergies  Past Medical History:   Diagnosis Date    Arthritis     in knee    Disorder of thyroid, unspecified     Dysphagia, oropharyngeal 04/01/2016    Headache(784.0)     Irregular heart beat     Mass of larynx 10/02/2012    Neurofibromatosis syndrome     Neurofibromatosis, type 1 (von Recklinghausen's disease) birth    Neurofibromatosis, type 1 (von Recklinghausen's disease) 07/09/2012    Seizure 11/16/2022     Past Surgical History:   Procedure Laterality Date    ARM DEBRIDEMENT      NF - right    partial supraglottic laryngectomy  6/15/2012    REMOVAL OF SACRAL NEUROSTIMULATOR DEVICE N/A 3/30/2022    Procedure: REMOVAL, NEUROSTIMULATOR, SACRAL;  Surgeon: KIP Shipley MD;  Location: [facility];  Service: Urology;  Laterality: N/A;  FLUORO NEEDED  RN Pre OP, Covid screen 3-29-22.  C A    REPLACEMENT OF SACRAL NERVE STIMULATOR N/A 12/10/2021     Procedure: REPLACEMENT, NEUROSTIMULATOR, SACRAL;  Surgeon: KIP Shipley MD;  Location: [facility];  Service: Urology;  Laterality: N/A;  [employer]  MICHELL SMITH [phone] WILL BE HERE FOR THE CASE ARYAN KOO ON 12-1-2021 WILL BE HERE  RN Pre Op 11-23-21.  ---COVID NEGATIVE ON 12/8----- C A  IMPLANTS ORDERED ON 12-1-2021     Family History   Problem Relation Name Age of Onset    Cancer Father          neuro fibroma mitosis    Cancer Paternal Uncle          neuro fibroma mitosis    Cancer Cousin          neuro fibroma mitosis     Social History[1]  Review of Systems   Constitutional:  Negative for chills and fever.   HENT:  Negative for sore throat.    Respiratory:  Negative for shortness of breath.    Cardiovascular:  Negative for chest pain.   Gastrointestinal:  Negative for nausea.   Genitourinary:  Negative for dysuria.   Musculoskeletal:  Positive for arthralgias and joint swelling. Negative for back pain.   Skin:  Negative for rash.   Neurological:  Negative for weakness.   Hematological:  Does not bruise/bleed easily.       Physical Exam     Initial Vitals [03/27/25 2112]   BP Pulse Resp Temp SpO2   (!) 139/115 88 18 98.4 °F (36.9 °C) 98 %      MAP       --         Physical Exam    Nursing note and vitals reviewed.  Constitutional: Vital signs are normal. He appears well-developed and well-nourished. He is cooperative. He does not appear ill. No distress.   Well-appearing.  No acute distress.   HENT:   Head: Normocephalic and atraumatic.   Right Ear: External ear normal.   Left Ear: External ear normal.   Nose: Nose normal.   Eyes: Conjunctivae and EOM are normal.   Neck: Phonation normal.   Normal range of motion.  Cardiovascular:  Normal rate and regular rhythm.           No murmur heard.  Pulmonary/Chest: Effort normal. No respiratory distress.   Abdominal: Abdomen is flat. He exhibits no distension. There is no abdominal tenderness.   Musculoskeletal:      Cervical back: Normal range of motion.       Comments: Mild ecchymosis about the base of the right 1st toe and around the right 1st MTP joint.  Pain with flexion of the joint.  Neurovascularly intact.  Ambulatory with a mildly antalgic gait.     Neurological: He is alert and oriented to person, place, and time. GCS eye subscore is 4. GCS verbal subscore is 5. GCS motor subscore is 6.   Skin: Skin is warm and dry. Capillary refill takes less than 2 seconds. No rash noted.         ED Course   Splint Application    Date/Time: 3/27/2025 11:41 PM    Performed by: Flo Gibbs PA-C  Authorized by: Mayela Ramachandran MD  Location: right foot.  Splint type: walking boot.  Supplies used: aluminum splint  Post-procedure: The splinted body part was neurovascularly unchanged following the procedure.  Patient tolerance: Patient tolerated the procedure well with no immediate complications        Labs Reviewed - No data to display       Imaging Results              X-Ray Toe 2 or More Views Right (Final result)  Result time 03/27/25 23:24:07      Final result by Rebecca Mayer MD (03/27/25 23:24:07)                   Impression:      No acute bony abnormality.      Electronically signed by: Rebecca Mayer  Date:    03/27/2025  Time:    23:24               Narrative:    EXAMINATION:  TWO OR MORE VIEWS OF THE RIGHT TOE    CLINICAL HISTORY:  toe injury;    TECHNIQUE:  AP, oblique, and lateral view of the right toe    COMPARISON:  None.    FINDINGS:  Mild soft tissue swelling of the great toe is seen.  Two or more views of the right great toe demonstrate no acute fracture or dislocation.                                       Medications   ketorolac injection 30 mg (30 mg Intramuscular Given 3/27/25 7077)   HYDROcodone-acetaminophen 5-325 mg per tablet 1 tablet (1 tablet Oral Given 3/27/25 8509)     Medical Decision Making  39-year-old male presenting to the emergency department for evaluation of right great toe pain.  Last night, he was running, took a bad step, and  suffered a hyperflexion injury of the right great toe.  Since then has had some pain and bruising to the area.  Denies numbness, weakness, or paresthesia.  He has been ambulatory but it is painful to ambulate.  He denies any pain with touching the toe but reports pain with range of motion and ambulation.  No medications today to alleviate symptoms.  Patient's chart and medical history reviewed.  Patient's vitals reviewed.  They are afebrile, no respiratory distress, nontoxic-appearing in the ED.  Differential diagnosis is considered the following.  - Septic Arthritis, Gout, Osteomyelitis: considered with pain, although unlikely without overlying swelling/edema, patient is afebrile  - Fracture/Dislocation: considered with pain, imaging ordered for further evaluation  - Contusion/Sprain/Strain: considered with pain with ROM and bruising.   - Compartment Syndrome: unlikely with 2+ distal pulses, no pallor, no paresthesias, no woody induration.   Ice applied and discussed RICE therapy for home. Per my interpretation no acute osseous abnormalities of the structures visualized in imaging including fracture or dislocation. Plan to provide walking boot and crutches for assistance and discussed early ambulation as tolerated.  Plan to discharged home with NSAIDs and muscle relaxers for pain.  Advised follow up with primary care provider within the week for re-evaluation and possible repeat imaging.  At this time I'll discharge home to follow up with primary care physician in the next 1-2 days for further evaluation.  If the pain continues the pt will need to see orthopedics for further evaluation.  The patient is comfortable with this plan and comfortable going home at this time. After taking into careful account the historical factors and physical exam findings of the patient's presentation today, in conjunction with the empirical and objective data obtained on ED workup, no acute emergent medical condition has been  identified. The patient appears to be low risk for an emergent medical condition and I feel it is safe and appropriate at this time for the patient to be discharged to follow-up as detailed in their discharge instructions for reevaluation and possible continued outpatient workup and management. I have discussed the specifics of the workup with the patient and the patient has verbalized understanding of the details of the workup, the diagnosis, the treatment plan, and the need for outpatient follow-up.  Although the patient has no emergent etiology today this does not preclude the development of an emergent condition so in addition, I have advised the patient that they can return to the ED and/or activate EMS at any time with worsening of their symptoms, change of their symptoms, or with any other medical complaint.  The patient remained comfortable and stable during their visit in the ED.  Discharge and follow-up instructions discussed with the patient who expressed understanding and willingness to comply with my recommendations. I discussed with the patient/family the diagnosis, treatment plan, indications for return to the emergency department, and for expected follow-up. Please follow up with your primary doctor in 1-2 days and return to the ED in any new, worsening, or continued symptoms. The patient/family verbalized an understanding. The patient/family is asked if there are any questions or concerns. We discuss the case, until all issues are addressed to the patient/family's satisfaction. Patient/family understands and is agreeable to the plan.    CHINO PERALTA PA-C    DISCLAIMER: This note was prepared with [place] voice recognition transcription software. Garbled syntax, mangled pronouns, and other bizarre constructions may be attributed to that software system.      Amount and/or Complexity of Data Reviewed  Radiology: ordered and independent interpretation performed.    Risk  OTC drugs.  Prescription drug  management.                                      Clinical Impression:  Final diagnoses:  [S93.601A] Sprain of right foot, initial encounter (Primary)          ED Disposition Condition    Discharge Stable          ED Prescriptions       Medication Sig Dispense Start Date End Date Auth. Provider    naproxen (NAPROSYN) 500 MG tablet Take 1 tablet (500 mg total) by mouth 2 (two) times daily. 30 tablet 3/27/2025 -- Flo Gibbs PA-C    methocarbamoL (ROBAXIN) 500 MG Tab Take 2 tablets (1,000 mg total) by mouth 3 (three) times daily. for 5 days 30 tablet 3/27/2025 4/1/2025 Flo Gibbs PA-C    acetaminophen (TYLENOL) 500 MG tablet Take 1 tablet (500 mg total) by mouth every 6 (six) hours as needed for Pain. 30 tablet 3/27/2025 -- Flo Gibbs PA-C          Follow-up Information       Follow up With Specialties Details Why Contact Info    Liyah Spring, DO Family Medicine Schedule an appointment as soon as possible for a visit in 1 day for follow up [address] [facility] [address]  [phone]      [facility] Emergency Dept Emergency Medicine Go to  If you have new or worsening symptoms, or if you have any concerns at all. [address] [facility] [place] [address] [phone]    Your primary care physician  Schedule an appointment as soon as possible for a visit in 1 day for follow up regarding today's visit and for re-evaluation. Please follow up in 1-2 days.     [facility] - Orthopedic Surgery, Physical Therapy Schedule an appointment as soon as possible for a visit  for orthopedic follow up, re-evaluation, or if symptoms worsen. [address]  [facility] [address]  [phone]                 Flo Gibbs PA-C  03/27/25 2336         [1]   Social History  Tobacco Use    Smoking status: Former    Smokeless tobacco: Former   Substance Use Topics    Alcohol use: No    Drug use: No         Flo Gibbs PA-C  03/27/25 3455

## 2025-04-17 ENCOUNTER — LAB VISIT (OUTPATIENT)
Dept: LAB | Facility: HOSPITAL | Age: 40
End: 2025-04-17
Attending: INTERNAL MEDICINE
Payer: MEDICARE

## 2025-04-17 DIAGNOSIS — D58.2 ELEVATED HEMOGLOBIN: ICD-10-CM

## 2025-04-17 LAB
ABSOLUTE EOSINOPHIL (OHS): 0.21 K/UL
ABSOLUTE MONOCYTE (OHS): 0.75 K/UL (ref 0.3–1)
ABSOLUTE NEUTROPHIL COUNT (OHS): 2.75 K/UL (ref 1.8–7.7)
BASOPHILS # BLD AUTO: 0.06 K/UL
BASOPHILS NFR BLD AUTO: 0.9 %
ERYTHROCYTE [DISTWIDTH] IN BLOOD BY AUTOMATED COUNT: 11.9 % (ref 11.5–14.5)
HCT VFR BLD AUTO: 53 % (ref 40–54)
HGB BLD-MCNC: 17.6 GM/DL (ref 14–18)
IMM GRANULOCYTES # BLD AUTO: 0.01 K/UL (ref 0–0.04)
IMM GRANULOCYTES NFR BLD AUTO: 0.2 % (ref 0–0.5)
LYMPHOCYTES # BLD AUTO: 2.57 K/UL (ref 1–4.8)
MCH RBC QN AUTO: 29.2 PG (ref 27–31)
MCHC RBC AUTO-ENTMCNC: 33.2 G/DL (ref 32–36)
MCV RBC AUTO: 88 FL (ref 82–98)
NUCLEATED RBC (/100WBC) (OHS): 0 /100 WBC
PLATELET # BLD AUTO: 353 K/UL (ref 150–450)
PMV BLD AUTO: 9.5 FL (ref 9.2–12.9)
RBC # BLD AUTO: 6.02 M/UL (ref 4.6–6.2)
RELATIVE EOSINOPHIL (OHS): 3.3 %
RELATIVE LYMPHOCYTE (OHS): 40.5 % (ref 18–48)
RELATIVE MONOCYTE (OHS): 11.8 % (ref 4–15)
RELATIVE NEUTROPHIL (OHS): 43.3 % (ref 38–73)
WBC # BLD AUTO: 6.35 K/UL (ref 3.9–12.7)

## 2025-04-17 PROCEDURE — 36415 COLL VENOUS BLD VENIPUNCTURE: CPT

## 2025-04-17 PROCEDURE — 85025 COMPLETE CBC W/AUTO DIFF WBC: CPT

## 2025-04-24 ENCOUNTER — OFFICE VISIT (OUTPATIENT)
Dept: HEMATOLOGY/ONCOLOGY | Facility: CLINIC | Age: 40
End: 2025-04-24
Payer: MEDICARE

## 2025-04-24 VITALS
DIASTOLIC BLOOD PRESSURE: 68 MMHG | HEIGHT: 63 IN | BODY MASS INDEX: 28.29 KG/M2 | HEART RATE: 64 BPM | SYSTOLIC BLOOD PRESSURE: 116 MMHG | WEIGHT: 159.63 LBS | OXYGEN SATURATION: 96 % | TEMPERATURE: 98 F

## 2025-04-24 DIAGNOSIS — R51.9 CHRONIC NONINTRACTABLE HEADACHE, UNSPECIFIED HEADACHE TYPE: ICD-10-CM

## 2025-04-24 DIAGNOSIS — D75.1 POLYCYTHEMIA: Primary | ICD-10-CM

## 2025-04-24 DIAGNOSIS — R42 DIZZINESS AND GIDDINESS: ICD-10-CM

## 2025-04-24 DIAGNOSIS — Z87.898 HISTORY OF SEIZURE: ICD-10-CM

## 2025-04-24 DIAGNOSIS — Q85.01 NEUROFIBROMATOSIS, TYPE 1 (VON RECKLINGHAUSEN'S DISEASE): ICD-10-CM

## 2025-04-24 DIAGNOSIS — G89.29 CHRONIC NONINTRACTABLE HEADACHE, UNSPECIFIED HEADACHE TYPE: ICD-10-CM

## 2025-04-24 PROCEDURE — 99214 OFFICE O/P EST MOD 30 MIN: CPT | Mod: S$GLB,,, | Performed by: INTERNAL MEDICINE

## 2025-04-24 PROCEDURE — G2211 COMPLEX E/M VISIT ADD ON: HCPCS | Mod: S$GLB,,, | Performed by: INTERNAL MEDICINE

## 2025-04-24 PROCEDURE — 3008F BODY MASS INDEX DOCD: CPT | Mod: CPTII,S$GLB,, | Performed by: INTERNAL MEDICINE

## 2025-04-24 PROCEDURE — 1159F MED LIST DOCD IN RCRD: CPT | Mod: CPTII,S$GLB,, | Performed by: INTERNAL MEDICINE

## 2025-04-24 PROCEDURE — 3074F SYST BP LT 130 MM HG: CPT | Mod: CPTII,S$GLB,, | Performed by: INTERNAL MEDICINE

## 2025-04-24 PROCEDURE — 99999 PR PBB SHADOW E&M-EST. PATIENT-LVL V: CPT | Mod: PBBFAC,,, | Performed by: INTERNAL MEDICINE

## 2025-04-24 PROCEDURE — 3078F DIAST BP <80 MM HG: CPT | Mod: CPTII,S$GLB,, | Performed by: INTERNAL MEDICINE

## 2025-04-24 NOTE — PROGRESS NOTES
PATIENT: Galindo Grant  MRN: [id number]  DATE: 2025      Diagnosis:   1. Elevated hemoglobin    2. Neurofibromatosis, type 1 (von Recklinghausen's disease)    3. Seizure    4. Pulmonary nodules            Chief Complaint: Follow-up (Elevated hemoglobin)      Subjective:     Interval History: Mr. Grant is a 39 y.o. male with Neurofibromatosis, arthritis, seizures who presents for follow up for elevated hematocrit.  Since the last clinci visit the patient underwent laboratory evaluation on 21.  Iron studies showed normal ferritin and TIBC.  Pt was negative for mutations with BCR/ABL, Jake2, MPL, and CALR.  EPO level was normal at 11.8mIU/mL.  Serum testosterone was normal at 822ng/mL.  The patient complains of chronic back pain from arthritis.  He complains of fatigue and occasional left sided pleuritic pain.  The patient denies cough, SOB, abdominal pain, N/V, constipation, diarrhea.  The patient denies fever, chills, night sweats, weight loss, new lumps or bumps, easy bruising or bleeding.  He states he last used testosterone at the end of .  Hx of testosterone supp use last yr pill form OTC at [facility] ( pt reports was on it for 2 yrs) Pt  Followed by Pulmonology for pulmonary nodules. Pt's father  of NF-associated lung tumor, per pt. 2020 and 2021 chest CTs (in [facility] system) showed unchanged small HUSAM pleural based nodule and right perifussural nodule.     Interval Hx;   He reports dizziness x 3 days   Appetite and weight stable  He is f/b Pulmonology for PATRICIA   Sub compliant with CPAP   Mild  fatigue  CBC 25 shows Hb 17.6 g/dL  Hct 53           He endorses compliance with Keppra    Former smoker remote past   Past Medical History:   Past Medical History:   Diagnosis Date    Arthritis     in knee    Disorder of thyroid, unspecified     Dysphagia, oropharyngeal 2016    Headache(784.0)     Irregular heart beat     Mass of larynx 10/02/2012     Neurofibromatosis syndrome     Neurofibromatosis, type 1 (von Recklinghausen's disease) birth    Neurofibromatosis, type 1 (von Recklinghausen's disease) 07/09/2012    Seizure 11/16/2022       Past Surgical HIstory:   Past Surgical History:   Procedure Laterality Date    ARM DEBRIDEMENT      NF - right    partial supraglottic laryngectomy  6/15/2012    REMOVAL OF SACRAL NEUROSTIMULATOR DEVICE N/A 3/30/2022    Procedure: REMOVAL, NEUROSTIMULATOR, SACRAL;  Surgeon: KIP Shipley MD;  Location: [facility] OR;  Service: Urology;  Laterality: N/A;  FLUORO NEEDED  RN Pre OP, Covid screen 3-29-22.  C A    REPLACEMENT OF SACRAL NERVE STIMULATOR N/A 12/10/2021    Procedure: REPLACEMENT, NEUROSTIMULATOR, SACRAL;  Surgeon: KIP Shipley MD;  Location: [facility] OR;  Service: Urology;  Laterality: N/A;  [place]  [facility] SARAH [phone] WILL BE HERE FOR THE CASE PER HANANE ON 12-1-2021 WILL BE HERE  RN Pre Op 11-23-21.  ---COVID NEGATIVE ON 12/8----- C A  IMPLANTS ORDERED ON 12-1-2021       Family History:   Family History   Problem Relation Name Age of Onset    Cancer Father          neuro fibroma mitosis    Cancer Paternal Uncle          neuro fibroma mitosis    Cancer Cousin          neuro fibroma mitosis       Social History:  reports that he has never smoked. He has never been exposed to tobacco smoke. He has never used smokeless tobacco. He reports that he does not drink alcohol and does not use drugs.    Allergies:  Review of patient's allergies indicates:  No Known Allergies    Medications:  Current Outpatient Medications   Medication Sig Dispense Refill    acetaminophen (TYLENOL) 500 MG tablet Take 1 tablet (500 mg total) by mouth every 6 (six) hours as needed for Pain. 30 tablet 0    acetaminophen (TYLENOL) 500 MG tablet Take 1 tablet (500 mg total) by mouth every 6 (six) hours as needed for Pain. 30 tablet 0    albuterol (PROVENTIL/VENTOLIN HFA) 90 mcg/actuation inhaler Inhale into the lungs.      atorvastatin  (LIPITOR) 10 MG tablet Take 1 tablet (10 mg total) by mouth once daily. 90 tablet 1    bacitracin 500 unit/gram Oint Apply topically 2 (two) times daily. 30 g 0    calcium-vitamin D3 (CALCIUM 500 + D) 500 mg(1,250mg) -200 unit per tablet Take 1 tablet by mouth 2 (two) times daily with meals. 180 tablet 3    cyanocobalamin (VITAMIN B-12) 1000 MCG tablet Take 100 mcg by mouth 2 (two) times a day.      cyclobenzaprine (FLEXERIL) 5 MG tablet Take 1 tablet (5 mg total) by mouth nightly as needed for Muscle spasms. 90 tablet 1    HYDROcodone-acetaminophen (NORCO) 5-325 mg per tablet Take 1 tablet by mouth every 8 (eight) hours as needed for Pain. 30 tablet 0    hydrOXYzine HCl (ATARAX) 25 MG tablet Take 1 tablet (25 mg total) by mouth 3 (three) times daily. DO NOT DRIVE AFTER TAKING MED 90 tablet 5    ibuprofen (ADVIL,MOTRIN) 800 MG tablet Take 1 tablet (800 mg total) by mouth every 6 (six) hours as needed for Pain. 20 tablet 0    levETIRAcetam (KEPPRA) 500 MG Tab Take 1 tablet (500 mg total) by mouth 2 (two) times daily. 180 tablet 1    levothyroxine sodium (LEVOTHYROXINE ORAL) Take by mouth.      meloxicam (MOBIC) 15 MG tablet Take 1 tablet (15 mg total) by mouth once daily. 30 tablet 0    methylPREDNISolone (MEDROL DOSEPACK) 4 mg tablet use as directed 1 each 0    metoprolol tartrate (LOPRESSOR) 25 MG tablet Take 0.5 tablets (12.5 mg total) by mouth 2 (two) times daily. 90 tablet 1    mupirocin (BACTROBAN) 2 % ointment Apply topically 3 (three) times daily. 30 g 0    naproxen (NAPROSYN) 500 MG tablet Take 1 tablet (500 mg total) by mouth 2 (two) times daily. 30 tablet 0    nitroGLYCERIN (NITROSTAT) 0.4 MG SL tablet Place 1 tablet (0.4 mg total) under the tongue every 5 (five) minutes as needed for Chest pain (Repeat in 5 min if CP persists. Go to ER if CP worsens). 30 tablet 2    rosuvastatin (CRESTOR) 10 MG tablet Take 10 mg by mouth once daily.      simvastatin (ZOCOR) 20 MG tablet Take 20 mg by mouth every evening.   "    ubrogepant (UBRELVY) 100 mg tablet Take 1 tablet (100 mg total) by mouth as needed for Migraine. Okay to take 2nd tablet (another 100mg) by mouth in 2 hrs, no more than 2 in 24 hrs. 16 tablet 3    vitamin D (VITAMIN D3) 1000 units Tab Take 1,000 Units by mouth 2 (two) times a day.      baclofen (LIORESAL) 10 MG tablet Take 1 tablet (10 mg total) by mouth 3 (three) times daily. 90 tablet 11    diclofenac sodium (VOLTAREN) 1 % Gel Apply 2 g topically 4 (four) times daily. 100 g 5    FLUoxetine 20 MG capsule Take 1 capsule (20 mg total) by mouth once daily. 90 capsule 3     No current facility-administered medications for this visit.       Review of Systems   Constitutional:  Negative for chills, fatigue, fever and unexpected weight change.   Respiratory:  Negative for cough and shortness of breath.    Cardiovascular:  Negative for chest pain and palpitations.   Gastrointestinal:  Negative for abdominal pain, constipation, diarrhea, nausea and vomiting.   Musculoskeletal:  Positive for back pain (chronic).   Skin:  Negative for color change and rash.   Neurological:  Positive for dizziness, seizures and headaches.   Hematological:  Negative for adenopathy. Does not bruise/bleed easily.       ECOG Performance Status: 0   Objective:      Vitals:   Vitals:    04/24/25 1013   BP: 116/68   Pulse: 64   Temp: 98 °F (36.7 °C)   SpO2: 96%   Weight: 72.4 kg (159 lb 9.8 oz)   Height: 5' 3" (1.6 m)           Physical Exam   Constitutional: He is oriented to person, place, and time. He appears well-developed and well-nourished.   HENT:   Head: Normocephalic.   Eyes: No scleral icterus.   Neck: Normal range of motion. Neck supple. No thyromegaly present.   Cardiovascular: Normal rate, regular rhythm and normal heart sounds.    No murmur heard.  Pulmonary/Chest: Effort normal and breath sounds normal. He has no wheezes. He has no rales.   Abdominal: Soft. Bowel sounds are normal. He exhibits no distension. There is no tenderness. " There is no rebound and no guarding.   Musculoskeletal: Normal range of motion. He exhibits no edema.   Lymphadenopathy:     He has no cervical adenopathy.     He has no axillary adenopathy.        Right: No supraclavicular adenopathy present.        Left: No supraclavicular adenopathy present.   Neurological: He is alert and oriented to person, place, and time. No cranial nerve deficit.   Skin: No rash noted. No erythema.   Psychiatric: He has a normal mood and affect.          Laboratory Data:  No visits with results within 1 Week(s) from this visit.   Latest known visit with results is:   Lab Visit on 04/17/2025   Component Date Value Ref Range Status    WBC 04/17/2025 6.35  3.90 - 12.70 K/uL Final    RBC 04/17/2025 6.02  4.60 - 6.20 M/uL Final    HGB 04/17/2025 17.6  14.0 - 18.0 gm/dL Final    HCT 04/17/2025 53.0  40.0 - 54.0 % Final    MCV 04/17/2025 88  82 - 98 fL Final    MCH 04/17/2025 29.2  27.0 - 31.0 pg Final    MCHC 04/17/2025 33.2  32.0 - 36.0 g/dL Final    RDW 04/17/2025 11.9  11.5 - 14.5 % Final    Platelet Count 04/17/2025 353  150 - 450 K/uL Final    MPV 04/17/2025 9.5  9.2 - 12.9 fL Final    Nucleated RBC 04/17/2025 0  <=0 /100 WBC Final    Neut % 04/17/2025 43.3  38 - 73 % Final    Lymph % 04/17/2025 40.5  18 - 48 % Final    Mono % 04/17/2025 11.8  4 - 15 % Final    Eos % 04/17/2025 3.3  <=8 % Final    Basophil % 04/17/2025 0.9  <=1.9 % Final    Imm Grans % 04/17/2025 0.2  0.0 - 0.5 % Final    Neut # 04/17/2025 2.75  1.8 - 7.7 K/uL Final    Lymph # 04/17/2025 2.57  1 - 4.8 K/uL Final    Mono # 04/17/2025 0.75  0.3 - 1 K/uL Final    Eos # 04/17/2025 0.21  <=0.5 K/uL Final    Baso # 04/17/2025 0.06  <=0.2 K/uL Final    Imm Grans # 04/17/2025 0.01  0.00 - 0.04 K/uL Final    Mild elevation in immature granulocytes is non specific and can be seen in a variety of conditions including stress response, acute inflammation, trauma and pregnancy. Correlation with other laboratory and clinical findings is  essential.         Imaging: Reviewed       MRI T spine 11/11/2022     Impression:     No focal abnormality thoracic spine.     CT chest w/out contrast 6/13/22   Impression:     Lung-RADS Category:  1-benign appearance or behavior-Continue annual screening with LDCT in 12 months.     Clinically or potentially clinically significant non lung cancer finding:  None.        CT chest w/out contrast 10 /9/23     Impression:     Stable calcified subcentimeter For multiple solid nodules all <6 mm, Fleischner Society 2017 guidelines recommend no routine follow up for a low risk patient, or follow up with non-contrast chest CT at 12 months after discovery in a high risk patient.  Assessment:       1. Polycythemia    2. Dizziness and giddiness    3. Neurofibromatosis, type 1 (von Recklinghausen's disease)    4. History of seizure    5. Chronic nonintractable headache, unspecified headache type             Plan:     Elevated Hematocrit - The patient has 2 hemoglobin values in the last 3 years which were above the threshold of 16.5 to cause concern for polycythemia  -Hemoglobin on 7/22/20 was 18.7g/dL  -Repeat hemoglobin on 4/17/21 was 17g/dL  -The patient has been using testosterone on a regular basis  -His testosterone use may be contributing to his elevated hemoglobin values  -Pt again instructed to avoid testosterone use and that elevated hemoglobin values can predispose to heart disease and strokes  He is no longer on testosterone  - lab work on 4/12/21 showed normal ferritin and TIBC. Pt was negative for mutations with BCR/ABL, Jake2 V617F, MPL, and CALR.  EPO level was normal at 11.8mIU/mL.  Serum testosterone was normal at 822ng/mL  - Jak2 Exon 12 mutationNegative. No pathogenic genetic alterations were detected in  JAK2 , exons 12-15  Negative results for [id number], CALR exon 9, and MPL exon 10   -CBC 4/17/25 shows Hb 17.6 g/dL  Hct 53   - secondary to PATRICIA  asymptomatic  Cont to monitor counts    Dizziness  Plan MRI  imaging   F/u with neurology as planned in near future     Neurofibromatosis - PT is being followed by Dr Otero in Neurology  -PT at increased risk of neural sheath tumors.    Hx of Seizure - last seizure last month  Endorses compliance with Keppra  -Management per neurology      Pulmonary Nodules  F/b pulmonology   Small nodules unchnaged over 6 months between August 2020 and February 2021.  CT chest  6/13/22  Lung-RADS Category:  1-benign appearance   Follow up CT [id number]  Stable calcified subcentimeter For multiple solid nodules all <6 mm  Monitor    Chronic HA   Followed by Neurology       Schedule MRI brain prior to upcoming neurology visit   CBC, erythropoietin 1 week  prior to f/u  in 5 mos        Visit today included increased complexity associated with the care of the episodic problem dizziness   addressed and managing the longitudinal care of the patient due to the serious and/or complex managed problem(s)  polycythemia

## 2025-04-24 NOTE — Clinical Note
Schedule MRI brain prior to upcoming neurology visit   CBC, erythropoietin 1 week  prior to f/u  in 5 mos

## 2025-06-17 ENCOUNTER — OFFICE VISIT (OUTPATIENT)
Dept: NEUROLOGY | Facility: CLINIC | Age: 40
End: 2025-06-17
Payer: MEDICARE

## 2025-06-17 VITALS
BODY MASS INDEX: 26.91 KG/M2 | WEIGHT: 151.88 LBS | SYSTOLIC BLOOD PRESSURE: 109 MMHG | HEART RATE: 65 BPM | DIASTOLIC BLOOD PRESSURE: 59 MMHG

## 2025-06-17 DIAGNOSIS — G89.29 OTHER CHRONIC PAIN: ICD-10-CM

## 2025-06-17 DIAGNOSIS — R56.9 SEIZURE: ICD-10-CM

## 2025-06-17 DIAGNOSIS — R41.89 COGNITIVE IMPAIRMENT: Primary | ICD-10-CM

## 2025-06-17 DIAGNOSIS — G43.709 CHRONIC MIGRAINE WITHOUT AURA WITHOUT STATUS MIGRAINOSUS, NOT INTRACTABLE: ICD-10-CM

## 2025-06-17 DIAGNOSIS — Q85.01 NEUROFIBROMATOSIS, TYPE 1 (VON RECKLINGHAUSEN'S DISEASE): ICD-10-CM

## 2025-06-17 PROCEDURE — 99999 PR PBB SHADOW E&M-EST. PATIENT-LVL IV: CPT | Mod: PBBFAC,,, | Performed by: STUDENT IN AN ORGANIZED HEALTH CARE EDUCATION/TRAINING PROGRAM

## 2025-06-17 PROCEDURE — 3008F BODY MASS INDEX DOCD: CPT | Mod: CPTII,S$GLB,, | Performed by: STUDENT IN AN ORGANIZED HEALTH CARE EDUCATION/TRAINING PROGRAM

## 2025-06-17 PROCEDURE — 1159F MED LIST DOCD IN RCRD: CPT | Mod: CPTII,S$GLB,, | Performed by: STUDENT IN AN ORGANIZED HEALTH CARE EDUCATION/TRAINING PROGRAM

## 2025-06-17 PROCEDURE — 3074F SYST BP LT 130 MM HG: CPT | Mod: CPTII,S$GLB,, | Performed by: STUDENT IN AN ORGANIZED HEALTH CARE EDUCATION/TRAINING PROGRAM

## 2025-06-17 PROCEDURE — 99215 OFFICE O/P EST HI 40 MIN: CPT | Mod: S$GLB,,, | Performed by: STUDENT IN AN ORGANIZED HEALTH CARE EDUCATION/TRAINING PROGRAM

## 2025-06-17 PROCEDURE — 3078F DIAST BP <80 MM HG: CPT | Mod: CPTII,S$GLB,, | Performed by: STUDENT IN AN ORGANIZED HEALTH CARE EDUCATION/TRAINING PROGRAM

## 2025-06-17 RX ORDER — UBROGEPANT 100 MG/1
100 TABLET ORAL
Qty: 16 TABLET | Refills: 3 | Status: SHIPPED | OUTPATIENT
Start: 2025-06-17

## 2025-06-17 RX ORDER — LEVETIRACETAM 500 MG/1
500 TABLET ORAL 2 TIMES DAILY
Qty: 180 TABLET | Refills: 1 | Status: SHIPPED | OUTPATIENT
Start: 2025-06-17 | End: 2025-12-14

## 2025-06-17 NOTE — PROGRESS NOTES
F/u seizures and neurofibromatosis, chronic lower back pain.  Headaches. Cognitive impairment.     Galindo Grant is a 39 y.o. male with a history of multiple medical diagnoses as listed below that presents for evaluation to establish care for his history of seizures and neurofibromatosis.  Patient says that he has been taking medications as directed.  He has been concerned because he has been having increasing presents of neurofibromas on the scan.  He has not had any changes in his level of functioning.  No recent headaches.  He has an upcoming vision examination as he does complain of occasional episodes with vision seems to become less clear than he is accustomed.  He says that he has had a seizure recently despite being compliant with his medications.    Interval History  04/12/2021  Back pains, which are chronic have been his biggest botehr today. He has had CT as recommended. No headaches. He has recently seen his optometrist. No headaches. No seizures recently.    09/29/2021  Been doing well overall with exception of significant pain in his midback and low back.  Pain is so intense that time he feels that he has been unable to move.  No recent seizures.  Gabapentin was stopped because of undesirable side effects.  Other medications have been taking as directed without complaint.    11/16/2022  Concerned for significant worsening pain in his midback and low back radiating down his legs. No new weakness. No new bowel or bladder incontinence. No recent seizures.     4/24/23  Continues to have lower back pain, has not had followup w pain management. Here for MRI spine results. No new weakness. No new bowel or bladder incontinence. No recent seizures.     7/10/24  On keppra 1000mgbid, no seizures. Tolerating med. Worried about cyst on R hand, painful = if can be related to NF.  Continues to have LBP.  Also experiencing migraines - sensitive to light, noise. Can have nausea. Frontal, pulsatile. Difficult to  establish baseline. Has gotten better past few months.     10/18/24 - continues to have pain from neurofibramatosis.     6/17/25 - endorsing continued pain. cognitive impairment. No seizures. Ubrelvy for migraines.     PAST MEDICAL HISTORY:  Past Medical History:   Diagnosis Date    Arthritis     in knee    Disorder of thyroid, unspecified     Dysphagia, oropharyngeal 04/01/2016    Headache(784.0)     Irregular heart beat     Mass of larynx 10/02/2012    Neurofibromatosis syndrome     Neurofibromatosis, type 1 (von Recklinghausen's disease) birth    Neurofibromatosis, type 1 (von Recklinghausen's disease) 07/09/2012    Seizure 11/16/2022       PAST SURGICAL HISTORY:  Past Surgical History:   Procedure Laterality Date    ARM DEBRIDEMENT      NF - right    partial supraglottic laryngectomy  6/15/2012    REMOVAL OF SACRAL NEUROSTIMULATOR DEVICE N/A 3/30/2022    Procedure: REMOVAL, NEUROSTIMULATOR, SACRAL;  Surgeon: KIP Shipley MD;  Location: [facility] OR;  Service: Urology;  Laterality: N/A;  FLUORO NEEDED  RN Pre OP, Covid screen 3-29-22.  C A    REPLACEMENT OF SACRAL NERVE STIMULATOR N/A 12/10/2021    Procedure: REPLACEMENT, NEUROSTIMULATOR, SACRAL;  Surgeon: KIP Shipley MD;  Location: [facility] OR;  Service: Urology;  Laterality: N/A;  Sientra  MICHELL SARAH [phone] WILL BE HERE FOR THE CASE PER HANANE ON 12-1-2021 WILL BE HERE  RN Pre Op 11-23-21.  ---COVID NEGATIVE ON 12/8----- C A  IMPLANTS ORDERED ON 12-1-2021       SOCIAL HISTORY:  Social History     Socioeconomic History    Marital status:     Number of children: 1    Years of education: 12   Occupational History    Occupation:      Employer: OTHER   Tobacco Use    Smoking status: Never     Passive exposure: Never    Smokeless tobacco: Never   Substance and Sexual Activity    Alcohol use: No    Drug use: No    Sexual activity: Yes     Partners: Female   Social History Narrative    Moved back to [facility] in 2015. Now .      Social  Drivers of Health     Financial Resource Strain: Low Risk  (7/10/2024)    Overall Financial Resource Strain (CARDIA)     Difficulty of Paying Living Expenses: Not very hard   Food Insecurity: Unknown (7/10/2024)    Hunger Vital Sign     Worried About Running Out of Food in the Last Year: Patient declined     Ran Out of Food in the Last Year: Never true   Transportation Needs: High Risk (11/4/2024)    Received from [facility] SDOH Screening     Has lack of transportation kept you from medical appointments, meetings, work or from getting things needed for daily living? choose all that apply.: Yes, it has kept me from non-medical meetings, appointments, work or from getting things that i need   Physical Activity: Unknown (7/10/2024)    Exercise Vital Sign     Days of Exercise per Week: 0 days   Stress: No Stress Concern Present (7/10/2024)    [employer] [place] [employer] - Occupational Stress Questionnaire     Feeling of Stress : Only a little   Housing Stability: High Risk (11/4/2024)    Received from [facility] SDOH Screening     What is your living situation today?: I have a place to live today, but i am worried about losing it in the future       FAMILY HISTORY:  Family History   Problem Relation Name Age of Onset    Cancer Father          neuro fibroma mitosis    Cancer Paternal Uncle          neuro fibroma mitosis    Cancer Cousin          neuro fibroma mitosis       ALLERGIES AND MEDICATIONS: updated and reviewed.  Review of patient's allergies indicates:  No Known Allergies  Current Outpatient Medications   Medication Sig Dispense Refill    acetaminophen (TYLENOL) 500 MG tablet Take 1 tablet (500 mg total) by mouth every 6 (six) hours as needed for Pain. 30 tablet 0    acetaminophen (TYLENOL) 500 MG tablet Take 1 tablet (500 mg total) by mouth every 6 (six) hours as needed for Pain. 30 tablet 0    albuterol (PROVENTIL/VENTOLIN HFA) 90 mcg/actuation inhaler Inhale into the  lungs.      atorvastatin (LIPITOR) 10 MG tablet Take 1 tablet (10 mg total) by mouth once daily. 90 tablet 1    bacitracin 500 unit/gram Oint Apply topically 2 (two) times daily. 30 g 0    calcium-vitamin D3 (CALCIUM 500 + D) 500 mg(1,250mg) -200 unit per tablet Take 1 tablet by mouth 2 (two) times daily with meals. 180 tablet 3    cyanocobalamin (VITAMIN B-12) 1000 MCG tablet Take 100 mcg by mouth 2 (two) times a day.      cyclobenzaprine (FLEXERIL) 5 MG tablet Take 1 tablet (5 mg total) by mouth nightly as needed for Muscle spasms. 90 tablet 1    HYDROcodone-acetaminophen (NORCO) 5-325 mg per tablet Take 1 tablet by mouth every 8 (eight) hours as needed for Pain. 30 tablet 0    hydrOXYzine HCl (ATARAX) 25 MG tablet Take 1 tablet (25 mg total) by mouth 3 (three) times daily. DO NOT DRIVE AFTER TAKING MED 90 tablet 5    ibuprofen (ADVIL,MOTRIN) 800 MG tablet Take 1 tablet (800 mg total) by mouth every 6 (six) hours as needed for Pain. 20 tablet 0    levothyroxine sodium (LEVOTHYROXINE ORAL) Take by mouth.      meloxicam (MOBIC) 15 MG tablet Take 1 tablet (15 mg total) by mouth once daily. 30 tablet 0    methylPREDNISolone (MEDROL DOSEPACK) 4 mg tablet use as directed 1 each 0    metoprolol tartrate (LOPRESSOR) 25 MG tablet Take 0.5 tablets (12.5 mg total) by mouth 2 (two) times daily. 90 tablet 1    mupirocin (BACTROBAN) 2 % ointment Apply topically 3 (three) times daily. 30 g 0    naproxen (NAPROSYN) 500 MG tablet Take 1 tablet (500 mg total) by mouth 2 (two) times daily. 30 tablet 0    nitroGLYCERIN (NITROSTAT) 0.4 MG SL tablet Place 1 tablet (0.4 mg total) under the tongue every 5 (five) minutes as needed for Chest pain (Repeat in 5 min if CP persists. Go to ER if CP worsens). 30 tablet 2    rosuvastatin (CRESTOR) 10 MG tablet Take 10 mg by mouth once daily.      simvastatin (ZOCOR) 20 MG tablet Take 20 mg by mouth every evening.      vitamin D (VITAMIN D3) 1000 units Tab Take 1,000 Units by mouth 2 (two) times a  day.      baclofen (LIORESAL) 10 MG tablet Take 1 tablet (10 mg total) by mouth 3 (three) times daily. 90 tablet 11    diclofenac sodium (VOLTAREN) 1 % Gel Apply 2 g topically 4 (four) times daily. 100 g 5    FLUoxetine 20 MG capsule Take 1 capsule (20 mg total) by mouth once daily. 90 capsule 3    levETIRAcetam (KEPPRA) 500 MG Tab Take 1 tablet (500 mg total) by mouth 2 (two) times daily. 180 tablet 1    ubrogepant (UBRELVY) 100 mg tablet Take 1 tablet (100 mg total) by mouth as needed for Migraine. Okay to take 2nd tablet (another 100mg) by mouth in 2 hrs, no more than 2 in 24 hrs. 16 tablet 3     No current facility-administered medications for this visit.     Review of Systems   Constitutional: Negative for activity change, fatigue and unexpected weight change.   HENT: Negative for trouble swallowing and voice change.    Eyes: Positive for visual disturbance. Negative for photophobia and pain.   Respiratory: Negative for apnea and shortness of breath.    Cardiovascular: Negative for chest pain and palpitations.   Gastrointestinal: Negative for constipation, nausea and vomiting.   Genitourinary: Negative for difficulty urinating.   Musculoskeletal: Negative for arthralgias, back pain, gait problem, myalgias and neck pain.   Skin: Negative for color change and rash.   Neurological: Positive for seizures. Negative for dizziness, syncope, speech difficulty, weakness, light-headedness, numbness and headaches.   Psychiatric/Behavioral: Negative for agitation, behavioral problems and confusion.       Vitals:    06/17/25 1000   BP: (!) 109/59   BP Location: Right arm   Patient Position: Sitting   Pulse: 65   Weight: 68.9 kg (151 lb 14.4 oz)       Physical Exam:  General: Not in acute distress. Not ill-appearing.   Psychiatric: Mood normal.        Neurologic Exam   Mental status: oriented to person, place, and time  Attention: Normal. Concentration: normal.  Speech: speech is normal.  Cranial Nerves: Symmetric facies.      Motor exam: moving extremities symmetrically      Assessment & Plan:    Problem List Items Addressed This Visit          Neuro    Neurofibromatosis, type 1 (von Recklinghausen's disease)    Relevant Medications    levETIRAcetam (KEPPRA) 500 MG Tab    Seizure    Relevant Medications    levETIRAcetam (KEPPRA) 500 MG Tab    Other chronic pain    Chronic migraine without aura without status migrainosus, not intractable    Relevant Medications    ubrogepant (UBRELVY) 100 mg tablet    Cognitive impairment - Primary    Relevant Orders    Ambulatory consult to Neuropsychology       This is a patient who has a follow up to me w neurofibramatosis with seizures well controlled, on keppra 1000mg BID. Has chronic lower back pain unchanged for years and had repeat MRI of thoracic and lumbar spine recently obtained which did not show significant changes nor significant canal stenosis. Continues to have pain, to reestablish with a pain management at this time given patient is interested in further intervention. Discussed lifestyle modifications.    Patient with chronic migraines, we will do Ubrelvy p.r.n..  Contraindication to triptan given he has a neurofibromatosis.    Continue Keppra 1000 mg twice a day, seizure precautions discussed.  Patient tolerating.    Also endorsed short term memory - MOCA 22/30. Scanned into system. Can have pain component as well, multifactorial. Low suspicion at this time for neurodegenerative process.  Discussed multifactorial nature and to identify sources contributing. Also placed neuropsych to help assist and can followup pcp as well.     Questions answered. For NF pain, to reestablish w pain management. Patient endorsing NF pain, discussed w patient I do not treat chronic pain as he is requesting pain options.     Patient was instructed about the following seizure precautions:   - Take all medications as prescribed by your doctor. Specifically take your anti-epileptic drugs at timed intervals  that are on the prescription label.  - Do not drive or operate heavy machinery for a state-law specific period of time from seizure activity, 6 months from last seizure  - If you ride a bicycle or any other manually propelled device, please use a helmet  - Never swim alone or without close supervision  - Use showers only; do not take a bath or put yourself in a situation where loss of responsiveness could lead to drowning  - Only cook under supervision. Use the back burners only.  - Do not hold a child or carry an infant. If breastfeeding, only perform this in a seated position with cushioning.   - Do not engage in any activity that in the event of a seizure or loss of responsiveness could lead to any type of bodily injury to yourself or others    6 months, okay for virtual for seizure and headache management.     Time spent on this encounter: 40 minutes. This includes face to face time and non-face to face time preparing to see the patient (eg, review of tests), obtaining and/or reviewing separately obtained history, documenting clinical information in the electronic or other health record, independently interpreting results and communicating results to the patient/family/caregiver, or care coordinator.

## 2025-07-01 NOTE — PROGRESS NOTES
NEUROPSYCHOLOGY CONSULT (TELEHEALTH)  Referral Information  Name: Galindo Grant  MRN: [id number]  : 1985  Age: 39 y.o.  Race: White  Gender: male  REFERRAL SOURCE: Tiny Oconnor MD   DATE CONDUCTED: 2025  Billin - 60 minutes  Telemedicine:   The patient location is: Home  The provider location is: Home  The chief complaint/medical necessity leading to consultation/medical necessity is: cognitive complaints in setting of seizures  Visit type: Virtual visit with audio  Total time spent with patient: 45 minutes  Each patient to whom he or she provides medical services by telemedicine is:  (1) informed of the relationship between the physician and patient and the respective role of any other health care provider with respect to management of the patient; and (2) notified that he or she may decline to receive medical services by telemedicine and may withdraw from such care at any time.  Consent/Emergency Plan: The patient expressed an understanding of the purpose of the evaluation and consented to all procedures. I informed the patient of limits to confidentiality and discussed an emergency plan.    NEUROPSYCHOLOGICAL EVALUATION - CONFIDENTIAL    SOURCES OF INFORMATION:  The following was gathered from a clinical interview with Mr. Galindo Grant and review of the available medical records. Mr. Grant expressed an understanding of the purpose of the evaluation and consented to all procedures. Total licensed billing psychologists professional time including clinical interview, test administration and interpretation of tests administered by the billing psychologist, integration of test results and other clinical data, preparing the final report, and personally reporting results to the patient     SUMMARY/TREATMENT PLAN   Mr. Grant is a 39 year old male with neurofibromatosis type 1, longstanding learning/cognitive difficulties, and seizure-like episodes. He was referred for neuropsychological  "evaluation due to concerns of progressive cognitive decline over the past few years with abnormal MoCA (22/30) in 2025. He is prescribed Keppra for seizure-like episodes, although there is no EEG on file since  with suggestion of non-epileptic events by both neurology and ED during symptom onset. Most recent episode was a few months ago. He is scheduled for neuropsychological evaluation on . Full diagnostic impressions to follow.     Diagnoses  1. Cognitive complaints  Assessment & Plan:  EMU: Reconsider referral to EMU, which was originally recommended in 2019 due to concerns of non-epileptic events. Updated EEG and neuroimaging may also help inform etiology of more recent cognitive difficulties.     Testin/26.       2. Neurofibromatosis, type 1 (von Recklinghausen's disease)    3. Seizure         Mr. Grant will be provided the results of the evaluation.     Thank you for allowing me to participate in Mr. Capps care.  If you have any questions, please contact me at [phone].    Omid Amato Psy.D., ABPP  Board Certified in Clinical Neuropsychology  Department of Neurology    HISTORY OF PRESENT ILLNESS: Mr. Galindo Grant is a 39 y.o., right-handed, male with 12 years of education who was referred for a neuropsychological evaluation due to cognitive complaints in the setting of neurofibromatosis type 1 and seizure-like episodes. History of baseline learning difficulties as highlighted below (special education). He established care with [facility] in  due to frequent episodes of "whole body shaking" without altered awareness for upwards of 10 years. He was referred to the EMU, but doesn't appear to have been scheduled. Neurology noted the possibility of non-epileptic events. Mr. Grant presented to the ED in 2020 with seizure-like episode, including having an active episode on arrival. ED physician also noted the possibility of non-epileptic events. Still, Keppra was " prescribed at that time and has been maintained for the past 5 years. It seems that he saw outside neurology shortly after the 7/2020 episode. While full notes are available from those visits, it does seem that an EEG was ordered at that time, which was documented as unremarkable. No additional EEGs since. He reported a seizure-like episode a few months ago. He described inconsistent medication adherence, but does not feel that episodes are more likely to occur after missed medications.     Mr. Grant was referred for neuropsychological evaluation due to abnormal MoCA in 6/2025 (22/30). He reported cognitive changes over the past several years with possible mild progression since onset. He primarily loses his train of thought, whether in the middle of a task or conversation. He endorsed occasional word finding difficulty. He doesn't notice issues with attention/focus. He indicated that his wife is also aware of his forgetfulness.     IADLS/DAILY FUNCTIONING:    Support System: Resides with wife.   Appointment Management: independent, he uses the [employer] ryley for tracking appointments. His wife added that she also writes appointments on a calendar to avoid forgetting.    Medication Compliance: independent, he fills a weekly pillbox every Monday. He reported inconsistent adherence. He doesn't use an alarm, noting that he saw on a Facebook group that someone said they could still forget even with alarms, so he is hesitant to start using them. He estimated forgetting to take his medications 3-4 days last week.   Financial Management: dependent, wife manages.   Cooking: Jointly with wife with no issues.   Driving: He has never driven.       MEDICAL HISTORY: Mr. Grant  has a past medical history of Arthritis, Disorder of thyroid, unspecified, Dysphagia, oropharyngeal (04/01/2016), Headache(784.0), Irregular heart beat, Mass of larynx (10/02/2012), Neurofibromatosis syndrome, Neurofibromatosis, type 1 (von  "Recklinghausen's disease) (birth), Neurofibromatosis, type 1 (von Recklinghausen's disease) (2012), and Seizure (2022).    BRAIN HEALTH RISK FACTORS:  Hearing Loss: Endorsed, hearing aids.   Sleep: PATRICIA with CPAP. Chronic pain interferes with sleep initiation/maintenance. He takes trazodone nightly (not in record as prescribed by outside PCP).      NEUROIMAGIN2012 Brain MRI: "The brain exhibits a normal architecture without evidence of congenital malformation.  There is no evidence of intracranial mass, hemorrhage, or infarction.  There is no restricted diffusion that would suggest acute ischemia.  The ventricular system is within normal limits of size for age and shows no evidence of hydrocephalus.  There are no significant extra-axial or extracranial abnormalities detected. The globes, orbits, pituitary gland, pineal gland and craniocervical   junction are normal in configuration.  Following administration of contrast, there is an enhancing mass within the upper airway anterior to C3 and C4, partially imaged, and better evaluated on MRI of the cervical spine of the same date.  The major vascular flow voids are patent."    2024 Head CT: "No acute intracranial abnormality detected. If persistent neurological abnormalities, consider MRI of the brain with diffusion-weighted sequencing."    SUBSTANCE USE: Mr. Grant reports that he has never smoked. He has never been exposed to tobacco smoke. He has never used smokeless tobacco. He reports that he does not drink alcohol and does not use drugs. He denied a history of substance abuse.     CURRENT MEDICATIONS:    Current Outpatient Medications:     acetaminophen (TYLENOL) 500 MG tablet, Take 1 tablet (500 mg total) by mouth every 6 (six) hours as needed for Pain., Disp: 30 tablet, Rfl: 0    acetaminophen (TYLENOL) 500 MG tablet, Take 1 tablet (500 mg total) by mouth every 6 (six) hours as needed for Pain., Disp: 30 tablet, Rfl: 0    albuterol " (PROVENTIL/VENTOLIN HFA) 90 mcg/actuation inhaler, Inhale into the lungs., Disp: , Rfl:     atorvastatin (LIPITOR) 10 MG tablet, Take 1 tablet (10 mg total) by mouth once daily., Disp: 90 tablet, Rfl: 1    bacitracin 500 unit/gram Oint, Apply topically 2 (two) times daily., Disp: 30 g, Rfl: 0    baclofen (LIORESAL) 10 MG tablet, Take 1 tablet (10 mg total) by mouth 3 (three) times daily., Disp: 90 tablet, Rfl: 11    calcium-vitamin D3 (CALCIUM 500 + D) 500 mg(1,250mg) -200 unit per tablet, Take 1 tablet by mouth 2 (two) times daily with meals., Disp: 180 tablet, Rfl: 3    cyanocobalamin (VITAMIN B-12) 1000 MCG tablet, Take 100 mcg by mouth 2 (two) times a day., Disp: , Rfl:     cyclobenzaprine (FLEXERIL) 5 MG tablet, Take 1 tablet (5 mg total) by mouth nightly as needed for Muscle spasms., Disp: 90 tablet, Rfl: 1    diclofenac sodium (VOLTAREN) 1 % Gel, Apply 2 g topically 4 (four) times daily., Disp: 100 g, Rfl: 5    FLUoxetine 20 MG capsule, Take 1 capsule (20 mg total) by mouth once daily., Disp: 90 capsule, Rfl: 3    HYDROcodone-acetaminophen (NORCO) 5-325 mg per tablet, Take 1 tablet by mouth every 8 (eight) hours as needed for Pain., Disp: 30 tablet, Rfl: 0    hydrOXYzine HCl (ATARAX) 25 MG tablet, Take 1 tablet (25 mg total) by mouth 3 (three) times daily. DO NOT DRIVE AFTER TAKING MED, Disp: 90 tablet, Rfl: 5    ibuprofen (ADVIL,MOTRIN) 800 MG tablet, Take 1 tablet (800 mg total) by mouth every 6 (six) hours as needed for Pain., Disp: 20 tablet, Rfl: 0    levETIRAcetam (KEPPRA) 500 MG Tab, Take 1 tablet (500 mg total) by mouth 2 (two) times daily., Disp: 180 tablet, Rfl: 1    levothyroxine sodium (LEVOTHYROXINE ORAL), Take by mouth., Disp: , Rfl:     meloxicam (MOBIC) 15 MG tablet, Take 1 tablet (15 mg total) by mouth once daily., Disp: 30 tablet, Rfl: 0    methylPREDNISolone (MEDROL DOSEPACK) 4 mg tablet, use as directed, Disp: 1 each, Rfl: 0    metoprolol tartrate (LOPRESSOR) 25 MG tablet, Take 0.5 tablets  "(12.5 mg total) by mouth 2 (two) times daily., Disp: 90 tablet, Rfl: 1    mupirocin (BACTROBAN) 2 % ointment, Apply topically 3 (three) times daily., Disp: 30 g, Rfl: 0    naproxen (NAPROSYN) 500 MG tablet, Take 1 tablet (500 mg total) by mouth 2 (two) times daily., Disp: 30 tablet, Rfl: 0    nitroGLYCERIN (NITROSTAT) 0.4 MG SL tablet, Place 1 tablet (0.4 mg total) under the tongue every 5 (five) minutes as needed for Chest pain (Repeat in 5 min if CP persists. Go to ER if CP worsens)., Disp: 30 tablet, Rfl: 2    rosuvastatin (CRESTOR) 10 MG tablet, Take 10 mg by mouth once daily., Disp: , Rfl:     simvastatin (ZOCOR) 20 MG tablet, Take 20 mg by mouth every evening., Disp: , Rfl:     ubrogepant (UBRELVY) 100 mg tablet, Take 1 tablet (100 mg total) by mouth as needed for Migraine. Okay to take 2nd tablet (another 100mg) by mouth in 2 hrs, no more than 2 in 24 hrs., Disp: 16 tablet, Rfl: 3    vitamin D (VITAMIN D3) 1000 units Tab, Take 1,000 Units by mouth 2 (two) times a day., Disp: , Rfl:      PSYCHIATRIC HISTORY: Reportedly was in therapy during first marriage, hasn't seen a therapist since that time. He indicated that he doesn't trust therapists as his former one filled a CPS report after he told her that he and his wife had an argument (he denied his daughter being involved). Currently, he can feel down every once in a while, but feels that it's normal. Chronic pain is a persistent issue that impacts mood. He denied anxiety. He spends his time watching television and playing games.     SUICIDAL IDEATION:  History: Denied  Active Suicidal Ideation:Denied  Plan/Intent: Denied    FAMILY HISTORY: family history includes Cancer in his cousin, father, and paternal uncle.    PSYCHOSOCIAL HISTORY:   Education:   Level Attained: High school graduate.    Learning Difficulties: Endorsed, LD, noting that all subjects were difficult for him. He described himself as a "slow learner" with comprehension issues. He doesn't recall " if he was told he had attention issues.    Special Education: Endorsed, elementary school through high school.    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 He last worked around 2009. He indicated that he is no longer able to work due to cognitive difficulties that were causing safety issues. He also has chronic pain.     Relationship Status:   : Yes, 6 years   : Yes, 1x.   Children: 1 from first marriage.     MENTAL STATUS AND OBSERVATIONS:  APPEARANCE: Unable to assess.   ALERTNESS/ORIENTATION: Attentive and alert.   GAIT/MOTOR: Unable to assess  SENSORY: Unable to assess.   SPEECH/LANGUAGE: Normal in rate, rhythm, tone, and volume. Expressive and receptive language were grossly intact.  STATED MOOD/AFFECT: Mood was euthymic.   INTERPERSONAL BEHAVIOR: Rapport was quickly and easily established   THOUGHT PROCESSES: Thoughts seemed logical and goal-directed.     PROPOSED TEST BATTERY:  YEARS OF ED: 12    MSVT  TOPF  Full WAIS w/o supplemental  RAVLT  WMS Visual repro and STORY  CALVIN COPY  TRAILS  NAB NAMING  PEGS  BDI/GAD7

## 2025-07-08 ENCOUNTER — OFFICE VISIT (OUTPATIENT)
Dept: NEUROLOGY | Facility: CLINIC | Age: 40
End: 2025-07-08
Payer: MEDICARE

## 2025-07-08 DIAGNOSIS — R56.9 SEIZURE: ICD-10-CM

## 2025-07-08 DIAGNOSIS — Q85.01 NEUROFIBROMATOSIS, TYPE 1 (VON RECKLINGHAUSEN'S DISEASE): ICD-10-CM

## 2025-07-08 DIAGNOSIS — R41.9 COGNITIVE COMPLAINTS: Primary | ICD-10-CM

## 2025-07-08 PROCEDURE — 90791 PSYCH DIAGNOSTIC EVALUATION: CPT | Mod: 93,,, | Performed by: PSYCHIATRY & NEUROLOGY

## 2025-07-08 NOTE — ASSESSMENT & PLAN NOTE
EMU: Reconsider referral to EMU, which was originally recommended in 2019 due to concerns of non-epileptic events. Updated EEG and neuroimaging may also help inform etiology of more recent cognitive difficulties.     Testin/26.

## 2025-08-11 ENCOUNTER — TELEPHONE (OUTPATIENT)
Dept: NEUROLOGY | Facility: CLINIC | Age: 40
End: 2025-08-11
Payer: MEDICARE

## 2025-08-26 ENCOUNTER — OFFICE VISIT (OUTPATIENT)
Dept: NEUROLOGY | Facility: CLINIC | Age: 40
End: 2025-08-26
Payer: MEDICARE

## 2025-08-26 DIAGNOSIS — Q85.01 NEUROFIBROMATOSIS, TYPE 1 (VON RECKLINGHAUSEN'S DISEASE): ICD-10-CM

## 2025-08-26 DIAGNOSIS — R56.9 SEIZURE: ICD-10-CM

## 2025-08-26 DIAGNOSIS — F06.70 MILD NEUROCOGNITIVE DISORDER DUE TO ANOTHER MEDICAL CONDITION: Primary | ICD-10-CM

## 2025-08-26 PROCEDURE — 96132 NRPSYC TST EVAL PHYS/QHP 1ST: CPT | Mod: S$GLB,,, | Performed by: PSYCHIATRY & NEUROLOGY

## 2025-08-26 PROCEDURE — 96138 PSYCL/NRPSYC TECH 1ST: CPT | Mod: S$GLB,,, | Performed by: PSYCHIATRY & NEUROLOGY

## 2025-08-26 PROCEDURE — 99499 UNLISTED E&M SERVICE: CPT | Mod: S$GLB,,, | Performed by: PSYCHIATRY & NEUROLOGY

## 2025-08-26 PROCEDURE — 96139 PSYCL/NRPSYC TST TECH EA: CPT | Mod: S$GLB,,, | Performed by: PSYCHIATRY & NEUROLOGY

## 2025-08-26 PROCEDURE — 96133 NRPSYC TST EVAL PHYS/QHP EA: CPT | Mod: S$GLB,,, | Performed by: PSYCHIATRY & NEUROLOGY

## 2025-08-26 PROCEDURE — 99999 PR PBB SHADOW E&M-EST. PATIENT-LVL I: CPT | Mod: PBBFAC,,,

## 2025-09-03 PROBLEM — F06.70 MILD NEUROCOGNITIVE DISORDER DUE TO ANOTHER MEDICAL CONDITION: Status: ACTIVE | Noted: 2025-06-17

## (undated) DEVICE — APPLICATOR CHLORAPREP ORN 26ML

## (undated) DEVICE — UNDERGLOVES BIOGEL PI SIZE 8

## (undated) DEVICE — ELECTRODE REM PLYHSV RETURN 9

## (undated) DEVICE — SEE MEDLINE ITEM 154981

## (undated) DEVICE — DRESSING ADH ISLAND 2.5 X 3

## (undated) DEVICE — BLADE SURG CARBON STEEL SZ11

## (undated) DEVICE — DRAPE SURG W/TWL 17 5/8X23

## (undated) DEVICE — PACK ENDOSCOPY GENERAL

## (undated) DEVICE — PILLOW HEAD REST

## (undated) DEVICE — SUT VICRYL 3-0 27 SH

## (undated) DEVICE — COVER OVERHEAD SURG LT BLUE

## (undated) DEVICE — LEAD INTERSTIM 2 SURESCAN 28CM
Type: IMPLANTABLE DEVICE | Site: BACK | Status: NON-FUNCTIONAL
Removed: 2021-12-10

## (undated) DEVICE — SYR 10CC LUER LOCK

## (undated) DEVICE — DRAPE C-ARM FOR MOBILE XRAY

## (undated) DEVICE — CLOSURE SKIN STERI STRIP 1/2X4

## (undated) DEVICE — Device

## (undated) DEVICE — DRESSING TRANS 4X4 TEGADERM

## (undated) DEVICE — DRAPE C-ARMOR EQUIPMENT COVER

## (undated) DEVICE — SOL WATER STRL IRR 1000ML

## (undated) DEVICE — SUT MONOCRYL 3-0 PS-2 UND

## (undated) DEVICE — NDL SAFETY 22G X 1.5 ECLIPSE

## (undated) DEVICE — NDL HYPO REG 25G X 1 1/2

## (undated) DEVICE — GLOVE SURG BIOGEL LATEX SZ 7.5

## (undated) DEVICE — BLANKET UPPER BODY 78.7X29.9IN

## (undated) DEVICE — GOWN B1 X-LG X-LONG

## (undated) DEVICE — SYR ONLY LUER LOCK 20CC

## (undated) DEVICE — FOAM APP FILM BARRIER NO STING